# Patient Record
Sex: MALE | Race: WHITE
[De-identification: names, ages, dates, MRNs, and addresses within clinical notes are randomized per-mention and may not be internally consistent; named-entity substitution may affect disease eponyms.]

---

## 2017-06-27 ENCOUNTER — HOSPITAL ENCOUNTER (EMERGENCY)
Dept: HOSPITAL 47 - EC | Age: 72
Discharge: HOME | End: 2017-06-27
Payer: MEDICARE

## 2017-06-27 VITALS — HEART RATE: 63 BPM | SYSTOLIC BLOOD PRESSURE: 191 MMHG | DIASTOLIC BLOOD PRESSURE: 83 MMHG | RESPIRATION RATE: 13 BRPM

## 2017-06-27 VITALS — TEMPERATURE: 98.2 F

## 2017-06-27 DIAGNOSIS — Z79.899: ICD-10-CM

## 2017-06-27 DIAGNOSIS — Z79.4: ICD-10-CM

## 2017-06-27 DIAGNOSIS — E11.9: ICD-10-CM

## 2017-06-27 DIAGNOSIS — R60.0: Primary | ICD-10-CM

## 2017-06-27 DIAGNOSIS — E07.9: ICD-10-CM

## 2017-06-27 DIAGNOSIS — I10: ICD-10-CM

## 2017-06-27 LAB
ALP SERPL-CCNC: 94 U/L (ref 38–126)
ALT SERPL-CCNC: 28 U/L (ref 21–72)
ANION GAP SERPL CALC-SCNC: 9 MMOL/L
APTT BLD: 22.9 SEC (ref 22–30)
AST SERPL-CCNC: 46 U/L (ref 17–59)
BASOPHILS # BLD AUTO: 0 K/UL (ref 0–0.2)
BASOPHILS NFR BLD AUTO: 0 %
BUN SERPL-SCNC: 11 MG/DL (ref 9–20)
CALCIUM SPEC-MCNC: 9.8 MG/DL (ref 8.4–10.2)
CH: 31
CHCM: 34.7
CHLORIDE SERPL-SCNC: 103 MMOL/L (ref 98–107)
CK SERPL-CCNC: 91 U/L (ref 55–170)
CO2 SERPL-SCNC: 25 MMOL/L (ref 22–30)
EOSINOPHIL # BLD AUTO: 0.1 K/UL (ref 0–0.7)
EOSINOPHIL NFR BLD AUTO: 1 %
ERYTHROCYTE [DISTWIDTH] IN BLOOD BY AUTOMATED COUNT: 4.58 M/UL (ref 4.3–5.9)
ERYTHROCYTE [DISTWIDTH] IN BLOOD: 12.9 % (ref 11.5–15.5)
GLUCOSE SERPL-MCNC: 175 MG/DL (ref 74–99)
HCT VFR BLD AUTO: 41.2 % (ref 39–53)
HDW: 2.65
HGB BLD-MCNC: 14.9 GM/DL (ref 13–17.5)
INR PPP: 1 (ref ?–1.1)
LUC NFR BLD AUTO: 2 %
LYMPHOCYTES # SPEC AUTO: 2.4 K/UL (ref 1–4.8)
LYMPHOCYTES NFR SPEC AUTO: 28 %
MAGNESIUM SPEC-SCNC: 2.3 MG/DL (ref 1.6–2.3)
MCH RBC QN AUTO: 32.5 PG (ref 25–35)
MCHC RBC AUTO-ENTMCNC: 36.1 G/DL (ref 31–37)
MCV RBC AUTO: 89.9 FL (ref 80–100)
MONOCYTES # BLD AUTO: 0.5 K/UL (ref 0–1)
MONOCYTES NFR BLD AUTO: 6 %
NEUTROPHILS # BLD AUTO: 5.6 K/UL (ref 1.3–7.7)
NEUTROPHILS NFR BLD AUTO: 64 %
NON-AFRICAN AMERICAN GFR(MDRD): >60
PH UR: 7.5 [PH] (ref 5–8)
PHOSPHATE SERPL-MCNC: 3.3 MG/DL (ref 2.5–4.5)
POTASSIUM SERPL-SCNC: 5.2 MMOL/L (ref 3.5–5.1)
POTASSIUM UR-SCNC: 6.3 MMOL/L
PROT SERPL-MCNC: 7.7 G/DL (ref 6.3–8.2)
PT BLD: 10.2 SEC (ref 9–12)
SODIUM SERPL-SCNC: 137 MMOL/L (ref 137–145)
SODIUM UR-SCNC: 81 MMOL/L (ref 30–90)
SP GR UR: 1 (ref 1–1.03)
TROPONIN I SERPL-MCNC: <0.012 NG/ML (ref 0–0.03)
UA BILLING (MACRO VS. MICRO): (no result)
UROBILINOGEN UR QL STRIP: <2 MG/DL (ref ?–2)
WBC # BLD AUTO: 0.15 10*3/UL
WBC # BLD AUTO: 8.7 K/UL (ref 3.8–10.6)
WBC (PEROX): 8.34

## 2017-06-27 PROCEDURE — 84133 ASSAY OF URINE POTASSIUM: CPT

## 2017-06-27 PROCEDURE — 85610 PROTHROMBIN TIME: CPT

## 2017-06-27 PROCEDURE — 99284 EMERGENCY DEPT VISIT MOD MDM: CPT

## 2017-06-27 PROCEDURE — 93971 EXTREMITY STUDY: CPT

## 2017-06-27 PROCEDURE — 82570 ASSAY OF URINE CREATININE: CPT

## 2017-06-27 PROCEDURE — 87086 URINE CULTURE/COLONY COUNT: CPT

## 2017-06-27 PROCEDURE — 84300 ASSAY OF URINE SODIUM: CPT

## 2017-06-27 PROCEDURE — 85730 THROMBOPLASTIN TIME PARTIAL: CPT

## 2017-06-27 PROCEDURE — 84484 ASSAY OF TROPONIN QUANT: CPT

## 2017-06-27 PROCEDURE — 96360 HYDRATION IV INFUSION INIT: CPT

## 2017-06-27 PROCEDURE — 93005 ELECTROCARDIOGRAM TRACING: CPT

## 2017-06-27 PROCEDURE — 80053 COMPREHEN METABOLIC PANEL: CPT

## 2017-06-27 PROCEDURE — 82310 ASSAY OF CALCIUM: CPT

## 2017-06-27 PROCEDURE — 84156 ASSAY OF PROTEIN URINE: CPT

## 2017-06-27 PROCEDURE — 87186 SC STD MICRODIL/AGAR DIL: CPT

## 2017-06-27 PROCEDURE — 83935 ASSAY OF URINE OSMOLALITY: CPT

## 2017-06-27 PROCEDURE — 87077 CULTURE AEROBIC IDENTIFY: CPT

## 2017-06-27 PROCEDURE — 81003 URINALYSIS AUTO W/O SCOPE: CPT

## 2017-06-27 PROCEDURE — 84100 ASSAY OF PHOSPHORUS: CPT

## 2017-06-27 PROCEDURE — 75635 CT ANGIO ABDOMINAL ARTERIES: CPT

## 2017-06-27 PROCEDURE — 83735 ASSAY OF MAGNESIUM: CPT

## 2017-06-27 PROCEDURE — 85025 COMPLETE CBC W/AUTO DIFF WBC: CPT

## 2017-06-27 PROCEDURE — 82553 CREATINE MB FRACTION: CPT

## 2017-06-27 PROCEDURE — 85379 FIBRIN DEGRADATION QUANT: CPT

## 2017-06-27 PROCEDURE — 36415 COLL VENOUS BLD VENIPUNCTURE: CPT

## 2017-06-27 PROCEDURE — 83880 ASSAY OF NATRIURETIC PEPTIDE: CPT

## 2017-06-27 PROCEDURE — 82550 ASSAY OF CK (CPK): CPT

## 2017-06-27 PROCEDURE — 96361 HYDRATE IV INFUSION ADD-ON: CPT

## 2017-06-27 NOTE — US
EXAMINATION TYPE: US venous doppler duplex LE LT

 

DATE OF EXAM: 6/27/2017 12:00 PM

 

COMPARISON: NONE

 

CLINICAL HISTORY: Pain. edema left leg

 

SIDE PERFORMED: left  

 

TECHNIQUE:  The lower extremity deep venous system is examined utilizing real time linear array sonog
navya with graded compression, doppler sonography and color-flow sonography.

 

VESSELS IMAGED:

External Iliac Vein (EIV)

Common Femoral Vein

Deep Femoral Vein

Greater Saphenous Vein *

Femoral Vein

Popliteal Vein

Small Saphenous Vein *

Proximal Calf Veins

(* superficial vessels)

 

 

 

Left Leg:  No evidence of DVT

 

 

 

IMPRESSION:  Grayscale, color doppler, spectral doppler imaging performed of the deep veins of the lo
wer extremities.  There is normal flow, compressibility, vascular waveforms bilaterally.  No evident 
deep venous thrombosis at or above the left knee.

## 2017-06-27 NOTE — ED
General Adult HPI





- General


Chief complaint: Extremity Problem,Nontraumatic


Stated complaint: left leg swelling


Time Seen by Provider: 06/27/17 11:16


Source: patient, RN notes reviewed, old records reviewed


Mode of arrival: wheelchair


Limitations: no limitations





- History of Present Illness


Initial comments: 





This is a 72-year-old male to the ER for evaluation.Today this patient presents 

for evaluation of left lower extremity edema, further exploratory pain.  

Patient denies any other complaints.  He does have history of diabetes.  He 

denies any erythema or drainage from his left leg.  Patient's pain and swelling 

in his left leg is been for 2 weeks, only in his left legpain or swelling in 

his right leg.  No trauma or travel history no history of clots.  Patient is 

not on blood thinners.  Again patient has no chest pain or shortness of breath.





- Related Data


 Home Medications











 Medication  Instructions  Recorded  Confirmed


 


A&D Capsule 1 cap PO QAM 06/27/17 06/27/17


 


Diltiazem HCl [Cartia Xt] 120 mg PO DAILY 06/27/17 06/27/17


 


Dim Enhanced 1 tab PO QAM 06/27/17 06/27/17


 


Antoinette  1 cap PO QAM 06/27/17 06/27/17


 


Glucobrium 1 tab PO DAILY 06/27/17 06/27/17


 


Homocystrol Tmg 1 tab PO QAM 06/27/17 06/27/17


 


Insulin NPH/Reg Insulin 70/30 15 mg PO HS 06/27/17 06/27/17





[humuLIN 70/30 VIAL]   


 


Peenuts 1 tab PO QAM 06/27/17 06/27/17


 


Pro Omega 1 tab PO QAM 06/27/17 06/27/17


 


Thyroid,Pork [Dix Thyroid] 15 mg PO DAILY 06/27/17 06/27/17


 


Thyroid,Pork [Dix Thyroid] 90 mg PO DAILY 06/27/17 06/27/17











 Allergies











Allergy/AdvReac Type Severity Reaction Status Date / Time


 


No Known Allergies Allergy   Verified 06/27/17 11:41














Review of Systems


ROS Statement: 


Those systems with pertinent positive or pertinent negative responses have been 

documented in the HPI.





ROS Other: All systems not noted in ROS Statement are negative.





Past Medical History


Past Medical History: Diabetes Mellitus, GERD/Reflux, Hyperlipidemia, 

Hypertension, Thyroid Disorder


History of Any Multi-Drug Resistant Organisms: None Reported


Past Psychological History: No Psychological Hx Reported


Smoking Status: Never smoker


Past Alcohol Use History: None Reported


Past Drug Use History: None Reported





General Exam


Limitations: no limitations


General appearance: alert, in no apparent distress


Head exam: Present: atraumatic, normocephalic, normal inspection


Eye exam: Present: normal appearance, PERRL, EOMI.  Absent: scleral icterus, 

conjunctival injection, periorbital swelling


ENT exam: Present: normal exam, mucous membranes moist


Neck exam: Present: normal inspection.  Absent: tenderness, meningismus, 

lymphadenopathy


Respiratory exam: Present: normal lung sounds bilaterally.  Absent: respiratory 

distress, wheezes, rales, rhonchi, stridor


Cardiovascular Exam: Present: regular rate, normal rhythm, normal heart sounds.

  Absent: systolic murmur, diastolic murmur, rubs, gallop, clicks


GI/Abdominal exam: Present: soft, normal bowel sounds.  Absent: distended, 

tenderness, guarding, rebound, rigid


Extremities exam: Present: normal inspection, full ROM, normal capillary refill

, other (Left lower extremity edema 2+).  Absent: tenderness, pedal edema, 

joint swelling, calf tenderness


Back exam: Present: normal inspection


Neurological exam: Present: alert, oriented X3, CN II-XII intact


Psychiatric exam: Present: normal affect, normal mood


Skin exam: Present: warm, dry, intact, normal color.  Absent: rash





Course


 Vital Signs











  06/27/17 06/27/17 06/27/17





  11:05 13:35 13:42


 


Temperature 98.4 F 98.2 F 


 


Pulse Rate 77 64 53 L


 


Respiratory 20 18 20





Rate   


 


Blood Pressure 190/81 185/82 207/83


 


O2 Sat by Pulse 99 97 99





Oximetry   














  06/27/17 06/27/17





  14:45 15:07


 


Temperature  


 


Pulse Rate 60 65


 


Respiratory 20 20





Rate  


 


Blood Pressure 183/77 181/79


 


O2 Sat by Pulse 96 96





Oximetry  














- Reevaluation(s)


Reevaluation #1: 





06/27/17 12:10


Patient is with no specific pain at this time.


Reevaluation #2: 





06/27/17 13:03


Patient is with no active vomiting at this time





EKG Findings





- EKG Comments:


EKG Findings:: EKG shows sinus pericardiophrenic 58, , QRS 80, 





Medical Decision Making





- Medical Decision Making





72 male the ER for evaluation.  Patient presented today for evaluation 

regarding left leg edema.  No specific injury found for edema.  No causes edema 

found.  Patient will follow up with primary care and further evaluation and 

treatment, management.  Patient's labwork is normal CT of left lower extremity 

as well as THE LEFT LOWER EXTREMITY ARE NEGATIVE





- Lab Data


Result diagrams: 


 06/27/17 13:10





 06/27/17 13:10


 Lab Results











  06/27/17 06/27/17 06/27/17 Range/Units





  13:10 13:10 13:10 


 


WBC   8.7   (3.8-10.6)  k/uL


 


RBC   4.58   (4.30-5.90)  m/uL


 


Hgb   14.9   (13.0-17.5)  gm/dL


 


Hct   41.2   (39.0-53.0)  %


 


MCV   89.9   (80.0-100.0)  fL


 


MCH   32.5   (25.0-35.0)  pg


 


MCHC   36.1   (31.0-37.0)  g/dL


 


RDW   12.9   (11.5-15.5)  %


 


Plt Count   247   (150-450)  k/uL


 


Neutrophils %   64   %


 


Lymphocytes %   28   %


 


Monocytes %   6   %


 


Eosinophils %   1   %


 


Basophils %   0   %


 


Neutrophils #   5.6   (1.3-7.7)  k/uL


 


Lymphocytes #   2.4   (1.0-4.8)  k/uL


 


Monocytes #   0.5   (0-1.0)  k/uL


 


Eosinophils #   0.1   (0-0.7)  k/uL


 


Basophils #   0.0   (0-0.2)  k/uL


 


PT     (9.0-12.0)  sec


 


INR     (<1.1)  


 


APTT     (22.0-30.0)  sec


 


D-Dimer     (<0.60)  mg/L FEU


 


Sodium    137  (137-145)  mmol/L


 


Potassium    5.2 H  (3.5-5.1)  mmol/L


 


Chloride    103  ()  mmol/L


 


Carbon Dioxide    25  (22-30)  mmol/L


 


Anion Gap    9  mmol/L


 


BUN    11  (9-20)  mg/dL


 


Creatinine    0.67  (0.66-1.25)  mg/dL


 


Est GFR (MDRD) Af Amer    >60  (>60 ml/min/1.73 sqM)  


 


Est GFR (MDRD) Non-Af    >60  (>60 ml/min/1.73 sqM)  


 


Glucose    175 H  (74-99)  mg/dL


 


Calcium    9.8  (8.4-10.2)  mg/dL


 


Phosphorus    3.3  (2.5-4.5)  mg/dL


 


Magnesium    2.3  (1.6-2.3)  mg/dL


 


Total Bilirubin    1.2  (0.2-1.3)  mg/dL


 


AST    46  (17-59)  U/L


 


ALT    28  (21-72)  U/L


 


Alkaline Phosphatase    94  ()  U/L


 


Total Creatine Kinase  91    ()  U/L


 


CK-MB (CK-2)  1.3    (0.0-2.4)  ng/mL


 


CK-MB (CK-2) Rel Index  1.4    


 


Troponin I  <0.012    (0.000-0.034)  ng/mL


 


Total Protein    7.7  (6.3-8.2)  g/dL


 


Albumin    4.4  (3.5-5.0)  g/dL


 


Urine Color     


 


Urine Appearance     (Clear)  


 


Urine pH     (5.0-8.0)  


 


Ur Specific Gravity     (1.001-1.035)  


 


Urine Protein     (Negative)  


 


Urine Glucose (UA)     (Negative)  


 


Urine Ketones     (Negative)  


 


Urine Blood     (Negative)  


 


Urine Nitrite     (Negative)  


 


Urine Bilirubin     (Negative)  


 


Urine Urobilinogen     (<2.0)  mg/dL


 


Ur Leukocyte Esterase     (Negative)  


 


Urine Osmolality     ()  mosm/kg


 


U Random Total Protein     (<12)  mg/dL


 


Ur Random Sodium     (30-90)  mmol/L


 


Ur Random Potassium     mmol/L


 


Ur Random Calcium     mg/dL














  06/27/17 06/27/17 06/27/17 Range/Units





  13:10 14:55 14:55 


 


WBC     (3.8-10.6)  k/uL


 


RBC     (4.30-5.90)  m/uL


 


Hgb     (13.0-17.5)  gm/dL


 


Hct     (39.0-53.0)  %


 


MCV     (80.0-100.0)  fL


 


MCH     (25.0-35.0)  pg


 


MCHC     (31.0-37.0)  g/dL


 


RDW     (11.5-15.5)  %


 


Plt Count     (150-450)  k/uL


 


Neutrophils %     %


 


Lymphocytes %     %


 


Monocytes %     %


 


Eosinophils %     %


 


Basophils %     %


 


Neutrophils #     (1.3-7.7)  k/uL


 


Lymphocytes #     (1.0-4.8)  k/uL


 


Monocytes #     (0-1.0)  k/uL


 


Eosinophils #     (0-0.7)  k/uL


 


Basophils #     (0-0.2)  k/uL


 


PT  10.2    (9.0-12.0)  sec


 


INR  1.0    (<1.1)  


 


APTT  22.9    (22.0-30.0)  sec


 


D-Dimer  0.59    (<0.60)  mg/L FEU


 


Sodium     (137-145)  mmol/L


 


Potassium     (3.5-5.1)  mmol/L


 


Chloride     ()  mmol/L


 


Carbon Dioxide     (22-30)  mmol/L


 


Anion Gap     mmol/L


 


BUN     (9-20)  mg/dL


 


Creatinine     (0.66-1.25)  mg/dL


 


Est GFR (MDRD) Af Amer     (>60 ml/min/1.73 sqM)  


 


Est GFR (MDRD) Non-Af     (>60 ml/min/1.73 sqM)  


 


Glucose     (74-99)  mg/dL


 


Calcium     (8.4-10.2)  mg/dL


 


Phosphorus     (2.5-4.5)  mg/dL


 


Magnesium     (1.6-2.3)  mg/dL


 


Total Bilirubin     (0.2-1.3)  mg/dL


 


AST     (17-59)  U/L


 


ALT     (21-72)  U/L


 


Alkaline Phosphatase     ()  U/L


 


Total Creatine Kinase     ()  U/L


 


CK-MB (CK-2)     (0.0-2.4)  ng/mL


 


CK-MB (CK-2) Rel Index     


 


Troponin I     (0.000-0.034)  ng/mL


 


Total Protein     (6.3-8.2)  g/dL


 


Albumin     (3.5-5.0)  g/dL


 


Urine Color     


 


Urine Appearance     (Clear)  


 


Urine pH     (5.0-8.0)  


 


Ur Specific Gravity     (1.001-1.035)  


 


Urine Protein     (Negative)  


 


Urine Glucose (UA)     (Negative)  


 


Urine Ketones     (Negative)  


 


Urine Blood     (Negative)  


 


Urine Nitrite     (Negative)  


 


Urine Bilirubin     (Negative)  


 


Urine Urobilinogen     (<2.0)  mg/dL


 


Ur Leukocyte Esterase     (Negative)  


 


Urine Osmolality    183  ()  mosm/kg


 


U Random Total Protein     (<12)  mg/dL


 


Ur Random Sodium     (30-90)  mmol/L


 


Ur Random Potassium     mmol/L


 


Ur Random Calcium   4.2   mg/dL














  06/27/17 06/27/17 Range/Units





  14:55 14:55 


 


WBC    (3.8-10.6)  k/uL


 


RBC    (4.30-5.90)  m/uL


 


Hgb    (13.0-17.5)  gm/dL


 


Hct    (39.0-53.0)  %


 


MCV    (80.0-100.0)  fL


 


MCH    (25.0-35.0)  pg


 


MCHC    (31.0-37.0)  g/dL


 


RDW    (11.5-15.5)  %


 


Plt Count    (150-450)  k/uL


 


Neutrophils %    %


 


Lymphocytes %    %


 


Monocytes %    %


 


Eosinophils %    %


 


Basophils %    %


 


Neutrophils #    (1.3-7.7)  k/uL


 


Lymphocytes #    (1.0-4.8)  k/uL


 


Monocytes #    (0-1.0)  k/uL


 


Eosinophils #    (0-0.7)  k/uL


 


Basophils #    (0-0.2)  k/uL


 


PT    (9.0-12.0)  sec


 


INR    (<1.1)  


 


APTT    (22.0-30.0)  sec


 


D-Dimer    (<0.60)  mg/L FEU


 


Sodium    (137-145)  mmol/L


 


Potassium    (3.5-5.1)  mmol/L


 


Chloride    ()  mmol/L


 


Carbon Dioxide    (22-30)  mmol/L


 


Anion Gap    mmol/L


 


BUN    (9-20)  mg/dL


 


Creatinine    (0.66-1.25)  mg/dL


 


Est GFR (MDRD) Af Amer    (>60 ml/min/1.73 sqM)  


 


Est GFR (MDRD) Non-Af    (>60 ml/min/1.73 sqM)  


 


Glucose    (74-99)  mg/dL


 


Calcium    (8.4-10.2)  mg/dL


 


Phosphorus    (2.5-4.5)  mg/dL


 


Magnesium    (1.6-2.3)  mg/dL


 


Total Bilirubin    (0.2-1.3)  mg/dL


 


AST    (17-59)  U/L


 


ALT    (21-72)  U/L


 


Alkaline Phosphatase    ()  U/L


 


Total Creatine Kinase    ()  U/L


 


CK-MB (CK-2)    (0.0-2.4)  ng/mL


 


CK-MB (CK-2) Rel Index    


 


Troponin I    (0.000-0.034)  ng/mL


 


Total Protein    (6.3-8.2)  g/dL


 


Albumin    (3.5-5.0)  g/dL


 


Urine Color   Colorless  


 


Urine Appearance   Clear  (Clear)  


 


Urine pH   7.5  (5.0-8.0)  


 


Ur Specific Gravity   1.001  (1.001-1.035)  


 


Urine Protein   Negative  (Negative)  


 


Urine Glucose (UA)   Negative  (Negative)  


 


Urine Ketones   Negative  (Negative)  


 


Urine Blood   Negative  (Negative)  


 


Urine Nitrite   Negative  (Negative)  


 


Urine Bilirubin   Negative  (Negative)  


 


Urine Urobilinogen   <2.0  (<2.0)  mg/dL


 


Ur Leukocyte Esterase   Negative  (Negative)  


 


Urine Osmolality    ()  mosm/kg


 


U Random Total Protein  13 H   (<12)  mg/dL


 


Ur Random Sodium  81   (30-90)  mmol/L


 


Ur Random Potassium  6.3   mmol/L


 


Ur Random Calcium    mg/dL














- Radiology Data


Radiology results: report reviewed (Ultrasound left lower extremity negative 

for DVT, patient does have CT of the abdominal aorta with left lower extremity 

runoff, patient does have patent arteries from abdominal aortic artery, iliac 

arteries down to foot), image reviewed





Disposition


Clinical Impression: 


 Leg edema, left





Disposition: HOME SELF-CARE


Condition: Fair


Instructions:  Leg Edema (ED)


Referrals: 


Jan Bay MD [Primary Care Provider] - 1-2 days

## 2017-06-27 NOTE — CT
EXAMINATION TYPE: CT angio abd aorta wo/w con

 

DATE OF EXAM: 6/27/2017

 

COMPARISON: NONE

 

HISTORY: Left leg swelling from hip to ankle x 3 months.

 

CT DLP: 844.80 mGycm, Automated Exposure Control for Dose Reduction was Utilized.

 

CONTRAST: 

CT scan of the abdomen and pelvis is performed with oral and without and with IV Contrast, patient in
jected with 125 mL of Omnipaque 350.

 

FINDINGS: Visualized portions of the lungs are clear. There is no pleural or pericardial fluid. The h
eart is not enlarged.

 

There is a small hiatal hernia.

 

Within the abdomen, the liver spleen and gallbladder appear normal.

 

Both adrenal glands appear normal.

 

Both kidneys demonstrate function and appear morphologically normal.

 

The pancreas is unremarkable.

 

The bladder is unremarkable.

 

The sigmoid colon is collapsed. This makes assessment wall thickness difficult. The descending colon 
is also collapsed. The appendix is not visualized.

 

Small bowel loops are normal.

 

There is no free fluid and no free air identified.

 

There is facet arthropathy and hypertrophic spondylosis within the spine. There is a degenerative gra
de 1 spondylolisthesis of L4 and L5.

 

The aorta is normal in caliber. The celiac, SMA and STARR vessels are patent. Both renal arteries are p
atent. There is moderate atheromatous calcification of the visualized arterial tree. Both the common 
iliacs are unremarkable. The internal/external iliac are patent. The superficial femoral and profunda
 femoral arteries are patent. Both popliteal arteries are patent. Initially there is two-vessel runof
f on the right but all the vessels attenuated distally. There is two-vessel runoff to the ankle on th
e left and one-vessel runoff on the right.

 

IMPRESSION:

1. CANNULATION OF THE LOWER LOBE VESSELS WITH TWO-VESSEL RUNOFF TO THE ANKLE ON THE LEFT AND ONE-VESS
EL RUNOFF ON THE RIGHT.

2. SMALL HIATAL HERNIA.

3. COLLAPSE OF THE LEFT SIDE OF THE COLON MAKES IT DIFFICULT TO ASSESS COLONIC THICKNESS. PLEASE FRANCIS
ELATE TO EXCLUDE COLITIS.

4. 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

## 2019-01-07 ENCOUNTER — HOSPITAL ENCOUNTER (OUTPATIENT)
Dept: HOSPITAL 47 - RADFLMAIN | Age: 74
Discharge: HOME | End: 2019-01-07
Attending: FAMILY MEDICINE
Payer: MEDICARE

## 2019-01-07 DIAGNOSIS — I69.991: ICD-10-CM

## 2019-01-07 DIAGNOSIS — R01.1: Primary | ICD-10-CM

## 2019-01-07 PROCEDURE — 74230 X-RAY XM SWLNG FUNCJ C+: CPT

## 2019-01-07 PROCEDURE — 93306 TTE W/DOPPLER COMPLETE: CPT

## 2019-01-07 NOTE — FL
EXAMINATION TYPE: FL barium swallow w video

 

DATE OF EXAM: 1/7/2019

 

MODIFIED SWALLOW / DEGLUTITION STUDY

 

CLINICAL HISTORY: Dysphagia. History of CVA.

 

TECHNIQUE:  Deglutition study is performed utilizing thin liquid barium, honey and nectar thick liqui
d barium, barium thick applesauce, and barium coated cracker. 0 images were saved as exam was video r
ecorded. 1 minute and 13 seconds of fluoroscopy time was utilized.

 

COMPARISON: None.

 

FINDINGS: The oral and pharyngeal phases show satisfactory initiation and propagation with all thicke
sy modalities tested. With the thin consistency there is premature spill to the level of the vallecu
la. Normal mastication is seen with solid modalities tested. Deep penetration was seen with the thin 
consistencies reduced occasional trace penetration with the chin tuck maneuver. There is no evidence 
of laryngeal aspiration with any modality tested.  No significant pharyngeal residue was appreciated.


 

IMPRESSION: Deep penetration when utilizing thin consistencies reduced to occasional trace penetratio
n with the utilization of the chin tuck maneuver. No laryngeal aspiration.  Please refer to speech th
erapist notes for further details if necessary.

## 2019-01-08 NOTE — ECHOF
Referral Reason:R01.1 Murmur, [id number] Dysphagia following



MEASUREMENTS

--------

HEIGHT: 177.8 cm

WEIGHT: 81.6 kg

BP: 176/76

RVIDd:   3.1 cm     (< 3.3)

IVSd:   1.2 cm     (0.6 - 1.1)

LVIDd:   4.3 cm     (3.9 - 5.3)

LVPWd:   1.1 cm     (0.6 - 1.1)

IVSs:   1.8 cm

LVIDs:   2.9 cm

LVPWs:   1.7 cm

LA Diam:   3.5 cm     (2.7 - 3.8)

LAESV Index (A-L):   22.48 ml/m

Ao Diam:   3.5 cm     (2.0 - 3.7)

AV Cusp:   2.3 cm     (1.5 - 2.6)

MV EXCURSION:   13.536 mm     (> 18.000)

MV EF SLOPE:   52 mm/s     (70 - 150)

EPSS:   0.7 cm

MV E Harley:   0.78 m/s

MV DecT:   307 ms

MV A Harley:   1.14 m/s

MV E/A Ratio:   0.68 

AR PHT:   664 ms







FINDINGS

--------

Sinus rhythm.

This was a technically adequate study.

The left ventricular size is normal.   There is borderline concentric left ventricular hypertrophy.  
 Overall left ventricular systolic function is low-normal with, an EF between 50 - 55 %.   Apical inf
erior LV wall motion is hypokinetic.    Apical septum LV wall motion is hypokinetic.

The right ventricle is normal in size.

Normal LA  size by volume 22+/-6 ml/m2.

The right atrium is normal in size.

There is mild aortic valve sclerosis.   There is mild aortic regurgitation.

Mild mitral annular calcification present.

The tricuspid valve appears structurally normal.

Trace/mild (physiologic)  pulmonic regurgitation.

The aortic root size is normal.

Normal inferior vena cava with normal inspiratory collapse consistent with estimated right atrial pre
ssure of  5 mmHg.

There is no pericardial effusion.



CONCLUSIONS

--------

1. Sinus rhythm.

2. This was a technically adequate study.

3. The left ventricular size is normal.

4. There is borderline concentric left ventricular hypertrophy.

5. Overall left ventricular systolic function is low-normal with, an EF between 50 - 55 %.

6. Apical inferior LV wall motion is hypokinetic.

7. Apical septum LV wall motion is hypokinetic.

8. The right ventricle is normal in size.

9. Normal LA size by volume 22+/-6 ml/m2.

10. The right atrium is normal in size.

11. There is mild aortic valve sclerosis.

12. There is mild aortic regurgitation.

13. Mild mitral annular calcification present.

14. The tricuspid valve appears structurally normal.

15. Trace/mild (physiologic)  pulmonic regurgitation.

16. The aortic root size is normal.

17. Normal inferior vena cava with normal inspiratory collapse consistent with estimated right atrial
 pressure of  5 mmHg.

18. There is no pericardial effusion.





SONOGRAPHER: Iva Rivero RDCS

## 2019-10-24 ENCOUNTER — HOSPITAL ENCOUNTER (OUTPATIENT)
Dept: HOSPITAL 47 - RADXRYALE | Age: 74
Discharge: HOME | End: 2019-10-24
Attending: PHYSICIAN ASSISTANT
Payer: MEDICARE

## 2019-10-24 DIAGNOSIS — M25.512: Primary | ICD-10-CM

## 2019-10-24 DIAGNOSIS — M25.522: ICD-10-CM

## 2019-10-24 NOTE — XR
Left elbow

 

HISTORY: Left elbow pain, trauma one week prior

 

3 views of the left elbow

 

There is no evident joint effusion. Marginal spurring is present at the radial head consistent with o
steoarthritic change. Alignment and bone mineralization are maintained.

 

IMPRESSION: No fracture or dislocation.

## 2019-10-24 NOTE — XR
Left shoulder

 

HISTORY: Pain, trauma one week prior

 

3 views of left shoulder

 

Bone mineralization is reduced. Alignment, joint spaces are maintained. Left lung apex as visualized 
is normal. Atheromatous changes present within the aorta.

 

IMPRESSION: No fracture or dislocation.

## 2020-08-31 ENCOUNTER — HOSPITAL ENCOUNTER (OUTPATIENT)
Dept: HOSPITAL 47 - RADXRYALE | Age: 75
Discharge: HOME | End: 2020-08-31
Attending: FAMILY MEDICINE
Payer: MEDICARE

## 2020-08-31 DIAGNOSIS — M19.032: Primary | ICD-10-CM

## 2020-08-31 NOTE — XR
Left wrist

 

HISTORY: Left wrist pain

 

3 views of the left wrist

 

Bone mineralization, joint spaces and alignment are maintained with exception of osteoarthritic pickard
e at the first metacarpal phalangeal joint. No fracture or dislocation. 

 

IMPRESSION: Osteoarthritis. Consider wrist MRI

## 2021-01-14 ENCOUNTER — HOSPITAL ENCOUNTER (OUTPATIENT)
Dept: HOSPITAL 47 - RADXRYALE | Age: 76
Discharge: HOME | End: 2021-01-14
Attending: FAMILY MEDICINE
Payer: MEDICARE

## 2021-01-14 DIAGNOSIS — Z11.1: ICD-10-CM

## 2021-01-14 DIAGNOSIS — Z02.2: Primary | ICD-10-CM

## 2021-01-14 PROCEDURE — 71046 X-RAY EXAM CHEST 2 VIEWS: CPT

## 2021-01-14 NOTE — XR
EXAMINATION TYPE: XR chest 2V

 

DATE OF EXAM: 1/14/2021

 

COMPARISON: NONE

 

TECHNIQUE: PA and lateral views submitted.

 

HISTORY: Nursing home

 

FINDINGS:

The lungs are clear and  there is no pneumothorax, pleural effusion, or focal pneumonia.  Hypertrophi
c and degenerative change of the spine. Atherosclerotic change aorta. No overt failure. Biapical pleu
ral thickening.

 

IMPRESSION: 

1. No acute process.

## 2021-06-04 ENCOUNTER — HOSPITAL ENCOUNTER (INPATIENT)
Dept: HOSPITAL 47 - EC | Age: 76
LOS: 5 days | Discharge: HOME | DRG: 645 | End: 2021-06-09
Attending: HOSPITALIST | Admitting: HOSPITALIST
Payer: MEDICARE

## 2021-06-04 VITALS — BODY MASS INDEX: 30.1 KG/M2

## 2021-06-04 DIAGNOSIS — M19.91: ICD-10-CM

## 2021-06-04 DIAGNOSIS — N40.0: ICD-10-CM

## 2021-06-04 DIAGNOSIS — I10: ICD-10-CM

## 2021-06-04 DIAGNOSIS — G31.84: ICD-10-CM

## 2021-06-04 DIAGNOSIS — Z86.73: ICD-10-CM

## 2021-06-04 DIAGNOSIS — E03.9: ICD-10-CM

## 2021-06-04 DIAGNOSIS — Z20.822: ICD-10-CM

## 2021-06-04 DIAGNOSIS — R53.81: ICD-10-CM

## 2021-06-04 DIAGNOSIS — R26.9: ICD-10-CM

## 2021-06-04 DIAGNOSIS — E78.5: ICD-10-CM

## 2021-06-04 DIAGNOSIS — Z79.84: ICD-10-CM

## 2021-06-04 DIAGNOSIS — E86.1: ICD-10-CM

## 2021-06-04 DIAGNOSIS — E22.2: Primary | ICD-10-CM

## 2021-06-04 DIAGNOSIS — E11.9: ICD-10-CM

## 2021-06-04 DIAGNOSIS — Z79.899: ICD-10-CM

## 2021-06-04 DIAGNOSIS — K21.9: ICD-10-CM

## 2021-06-04 PROCEDURE — 83605 ASSAY OF LACTIC ACID: CPT

## 2021-06-04 PROCEDURE — 84484 ASSAY OF TROPONIN QUANT: CPT

## 2021-06-04 PROCEDURE — 85730 THROMBOPLASTIN TIME PARTIAL: CPT

## 2021-06-04 PROCEDURE — 93005 ELECTROCARDIOGRAM TRACING: CPT

## 2021-06-04 PROCEDURE — 87077 CULTURE AEROBIC IDENTIFY: CPT

## 2021-06-04 PROCEDURE — 99285 EMERGENCY DEPT VISIT HI MDM: CPT

## 2021-06-04 PROCEDURE — 82550 ASSAY OF CK (CPK): CPT

## 2021-06-04 PROCEDURE — 80048 BASIC METABOLIC PNL TOTAL CA: CPT

## 2021-06-04 PROCEDURE — 36415 COLL VENOUS BLD VENIPUNCTURE: CPT

## 2021-06-04 PROCEDURE — 85610 PROTHROMBIN TIME: CPT

## 2021-06-04 PROCEDURE — 72125 CT NECK SPINE W/O DYE: CPT

## 2021-06-04 PROCEDURE — 80053 COMPREHEN METABOLIC PANEL: CPT

## 2021-06-04 PROCEDURE — 83735 ASSAY OF MAGNESIUM: CPT

## 2021-06-04 PROCEDURE — 71046 X-RAY EXAM CHEST 2 VIEWS: CPT

## 2021-06-04 PROCEDURE — 70450 CT HEAD/BRAIN W/O DYE: CPT

## 2021-06-04 PROCEDURE — 87635 SARS-COV-2 COVID-19 AMP PRB: CPT

## 2021-06-04 PROCEDURE — 81001 URINALYSIS AUTO W/SCOPE: CPT

## 2021-06-04 PROCEDURE — 96360 HYDRATION IV INFUSION INIT: CPT

## 2021-06-04 PROCEDURE — 85025 COMPLETE CBC W/AUTO DIFF WBC: CPT

## 2021-06-04 PROCEDURE — 83880 ASSAY OF NATRIURETIC PEPTIDE: CPT

## 2021-06-04 PROCEDURE — 87086 URINE CULTURE/COLONY COUNT: CPT

## 2021-06-04 PROCEDURE — 87186 SC STD MICRODIL/AGAR DIL: CPT

## 2021-06-04 PROCEDURE — 83935 ASSAY OF URINE OSMOLALITY: CPT

## 2021-06-04 PROCEDURE — 83930 ASSAY OF BLOOD OSMOLALITY: CPT

## 2021-06-04 PROCEDURE — 84300 ASSAY OF URINE SODIUM: CPT

## 2021-06-04 PROCEDURE — 84443 ASSAY THYROID STIM HORMONE: CPT

## 2021-06-09 VITALS
DIASTOLIC BLOOD PRESSURE: 70 MMHG | HEART RATE: 68 BPM | RESPIRATION RATE: 19 BRPM | SYSTOLIC BLOOD PRESSURE: 159 MMHG | TEMPERATURE: 97.8 F

## 2022-01-18 ENCOUNTER — HOSPITAL ENCOUNTER (EMERGENCY)
Dept: HOSPITAL 47 - EC | Age: 77
Discharge: HOME | End: 2022-01-18
Payer: MEDICARE

## 2022-01-18 VITALS
TEMPERATURE: 97.9 F | DIASTOLIC BLOOD PRESSURE: 54 MMHG | RESPIRATION RATE: 16 BRPM | SYSTOLIC BLOOD PRESSURE: 142 MMHG | HEART RATE: 71 BPM

## 2022-01-18 DIAGNOSIS — E11.9: ICD-10-CM

## 2022-01-18 DIAGNOSIS — K59.00: Primary | ICD-10-CM

## 2022-01-18 DIAGNOSIS — K21.9: ICD-10-CM

## 2022-01-18 DIAGNOSIS — Z86.73: ICD-10-CM

## 2022-01-18 DIAGNOSIS — E78.5: ICD-10-CM

## 2022-01-18 DIAGNOSIS — E07.9: ICD-10-CM

## 2022-01-18 DIAGNOSIS — Z79.82: ICD-10-CM

## 2022-01-18 DIAGNOSIS — I10: ICD-10-CM

## 2022-01-18 DIAGNOSIS — Z79.84: ICD-10-CM

## 2022-01-18 PROCEDURE — 74018 RADEX ABDOMEN 1 VIEW: CPT

## 2022-01-18 PROCEDURE — 99283 EMERGENCY DEPT VISIT LOW MDM: CPT

## 2022-01-18 NOTE — ED
General Adult HPI





- General


Chief complaint: Abdominal Pain


Stated complaint: constipation


Time Seen by Provider: 01/18/22 10:19


Source: patient


Mode of arrival: ambulatory





- History of Present Illness


Initial comments: 


Dictation was produced using dragon dictation software. please excuse any 

grammatical, word or spelling errors. 











Chief Complaint: Patient is a 76-year-old male presents emergency department for

constipation





History of Present Illness: Patient is a 68-year-old male he lives at a adult 

assisted living facility.  Patient states she has not had a bowel movement 3 

days.  Try to disimpact himself at home but was unable to.  States to hard.  

Patient was given some milk of magnesia and prune juice with no relief.  Patient

has any fever.  No nausea vomiting.








The ROS documented in this emergency department record has been reviewed and 

confirmed by me.  Those systems with pertinent positive or negative responses 

have been documented in the HPI.  All other systems are other negative and/or 

noncontributory.








PHYSICAL EXAM:


General Impression: Alert and oriented x3, not in acute distress


HEENT: Normocephalic atraumatic, extra-ocular movements intact, pupils equal and

reactive to light bilaterally, mucous membranes moist.


Cardiovascular: Heart regular rate and rhythm


Chest: Able to complete full sentences, no retractions, no tachypnea


Abdomen: abdomen soft, non-tender, non-distended, no organomegaly


Musculoskeletal: Pulses present and equal in all extremities, no peripheral 

edema


Motor:  no focal deficits noted


Neurological: CN II-XII grossly intact, no focal motor or sensory deficits noted


Skin: Intact with no visualized rashes


Psych: Normal affect and mood


Rectal exam: Hard stools felt in the rectal vault to no bleeding, no fissures





ED course: 76-year-old male presents to the emergency department for 

constipation 3 days.  Vital signs upon arrival are within acceptable limits.  

Abdominal x-rays unremarkable.  Rectal disimpaction performed performed at 

bedside.  Hard stools were removed in the rectal vault.  Patient able to have 

bowel movements to get the rest of it out.  Patient was placed on bedside 

commode and had 3 large stool movements..  Reevaluated at the bedside found to 

be in stable medical condition.  He reports he feels better.  Patient be 

discharge back to assisted living facility.




















- Related Data


                                Home Medications











 Medication  Instructions  Recorded  Confirmed


 


Thyroid,Pork [[place] Thyroid] 90 mg PO DAILY@0800 06/27/17 01/18/22


 


Aspirin EC [Ecotrin Low Dose] 81 mg PO HS@2000 06/04/21 01/18/22


 


DULoxetine HCL [Cymbalta] 30 mg PO DAILY@0800 06/04/21 01/18/22


 


Finasteride [Proscar] 5 mg PO HS@2000 06/04/21 01/18/22


 


Nitroglycerin Sl Tabs [Nitrostat] 0.4 mg SL Q5M PRN 06/04/21 01/18/22


 


Phenol [Chloraseptic] 1 spr MM Q2H PRN 06/04/21 01/18/22


 


Sodium Chloride [Ocean] 2 spr EA NOSTRIL AS DIRECTED PRN 06/04/21 01/18/22


 


Tamsulosin HCl [Flomax] 0.4 mg PO DAILY@0800 06/04/21 01/18/22


 


glipiZIDE XL [Glucotrol Xl] 5 mg PO DAILY@0600 06/04/21 01/18/22


 


lisinopriL 40 mg PO BID@0800,2000 06/04/21 01/18/22


 


Atorvastatin [Lipitor] 40 mg PO HS@2000 01/18/22 01/18/22


 


Benzocaine/Menthol [Cepacol Sore 1 lozenge MM AS DIRECTED PRN 01/18/22 01/18/22





Throat Lozenge]   


 


Cholestyramine (with Sugar) 4 gm PO DAILY@0800 01/18/22 01/18/22





[Questran]   


 


Diltiazem Cd [Cardizem CD] 120 mg PO DAILY@0800 01/18/22 01/18/22


 


Furosemide [Lasix] 20 mg PO DAILY@0800 01/18/22 01/18/22


 


Ipratropium Bromide [Ipratropium 2 spr EA NOSTRIL TID@0800,1400,2000 01/18/22 01/18/22





Bromide 0.03%]   


 


Loratadine 10 mg PO HS@2000 01/18/22 01/18/22


 


Multivit-Min/FA/Lycopen/Lutein 1 tab PO DAILY@0800 01/18/22 01/18/22





[Centrum Silver Tablet]   


 


Sennosides/Docusate Sodium [Senna 1 tab PO HS@2000 01/18/22 01/18/22





Plus 8.6-50 mg Tablet]   


 


Simethicone [Mylanta Gas Minis] 125 mg PO QID PRN 01/18/22 01/18/22


 


Sodium Chloride Tab 1 gm PO DAILY@0800 01/18/22 01/18/22


 


metFORMIN HCL ER [Glucophage XR] 500 mg PO BID@0800,2000 01/18/22 01/18/22


 


sitaGLIPtin [Januvia] 100 mg PO DAILY@0800 01/18/22 01/18/22








                                  Previous Rx's











 Medication  Instructions  Recorded


 


Acetaminophen Tab [Tylenol] 325 mg PO Q6HR PRN  tab 06/09/21











                                    Allergies











Allergy/AdvReac Type Severity Reaction Status Date / Time


 


No Known Allergies Allergy   Verified 01/18/22 10:30














Review of Systems


ROS Statement: 


Those systems with pertinent positive or pertinent negative responses have been 

documented in the HPI.





ROS Other: All systems not noted in ROS Statement are negative.





Past Medical History


Past Medical History: CVA/TIA, Diabetes Mellitus, GERD/Reflux, Hyperlipidemia, 

Hypertension, Thyroid Disorder


Additional Past Medical History / Comment(s): States had a stroke left arm and 

leg numbness


History of Any Multi-Drug Resistant Organisms: None Reported


Additional Past Surgical History / Comment(s): cataract surgery


Past Anesthesia/Blood Transfusion Reactions: No Reported Reaction


Past Psychological History: No Psychological Hx Reported


Smoking Status: Never smoker


Past Alcohol Use History: None Reported


Past Drug Use History: None Reported





- Past Family History


  ** Mother


Family Medical History: Diabetes Mellitus





Course


                                   Vital Signs











  01/18/22





  10:01


 


Temperature 97.9 F


 


Pulse Rate 71


 


Respiratory 16





Rate 


 


Blood Pressure 142/54


 


O2 Sat by Pulse 96





Oximetry 














Disposition


Clinical Impression: 


 Constipation





Disposition: HOME SELF-CARE


Condition: Fair


Instructions (If sedation given, give patient instructions):  Constipation (ED)


Is patient prescribed a controlled substance at d/c from ED?: No


Referrals: 


Grover Champagne MD [Primary Care Provider] - 1-2 days

## 2022-01-18 NOTE — XR
EXAMINATION TYPE: XR abdomen 1V

 

DATE OF EXAM: 1/18/2022

 

COMPARISON: NONE

 

HISTORY: Pain

 

TECHNIQUE: One view abdominal series

 

FINDINGS:  

The osseous structures are intact.  The bowel gas pattern is nonspecific. Arthropathy of the hips and
 degenerative change of the spine. Calcifications in the pelvis likely vascular.

 

IMPRESSION:  

1.  Nonspecific abdomen.

2. 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

## 2022-03-16 ENCOUNTER — HOSPITAL ENCOUNTER (EMERGENCY)
Dept: HOSPITAL 47 - EC | Age: 77
LOS: 1 days | Discharge: HOME | End: 2022-03-17
Payer: MEDICARE

## 2022-03-16 DIAGNOSIS — Z79.82: ICD-10-CM

## 2022-03-16 DIAGNOSIS — Z79.84: ICD-10-CM

## 2022-03-16 DIAGNOSIS — M17.0: Primary | ICD-10-CM

## 2022-03-16 DIAGNOSIS — K21.9: ICD-10-CM

## 2022-03-16 DIAGNOSIS — Z79.890: ICD-10-CM

## 2022-03-16 DIAGNOSIS — I10: ICD-10-CM

## 2022-03-16 DIAGNOSIS — Z79.899: ICD-10-CM

## 2022-03-16 DIAGNOSIS — Z79.4: ICD-10-CM

## 2022-03-16 DIAGNOSIS — E78.5: ICD-10-CM

## 2022-03-16 DIAGNOSIS — E11.9: ICD-10-CM

## 2022-03-16 LAB — GLUCOSE BLD-MCNC: 259 MG/DL (ref 75–99)

## 2022-03-16 PROCEDURE — 73562 X-RAY EXAM OF KNEE 3: CPT

## 2022-03-16 PROCEDURE — 99284 EMERGENCY DEPT VISIT MOD MDM: CPT

## 2022-03-16 PROCEDURE — 96372 THER/PROPH/DIAG INJ SC/IM: CPT

## 2022-03-16 PROCEDURE — 36415 COLL VENOUS BLD VENIPUNCTURE: CPT

## 2022-03-16 NOTE — XR
EXAMINATION TYPE: XR knee complete bilateral

 

DATE OF EXAM: 3/16/2022

 

COMPARISON: NONE

 

HISTORY: Knee pain

 

TECHNIQUE: 3 views each knee

 

FINDINGS: There is bilateral narrowing of the medial joint spaces of the knees. There is vascular tony
cification. I see no fracture nor dislocation. There is no evidence of knee joint effusion.

 

IMPRESSION:

Osteoarthritis. No fracture seen.

## 2022-03-16 NOTE — ED
Extremity Problem HPI





- General


Chief complaint: Extremity Problem,Nontraumatic


Stated complaint: weakness, knee pain


Time Seen by Provider: 03/16/22 21:36


Source: EMS, RN notes reviewed


Mode of arrival: EMS





- History of Present Illness


Initial comments: 





This is a 77-year-old male who presents from the [facility] for bilateral knee

pain.  Patient states that his knees "went out.  "Although he denies falling.  

He states his knees are just sore.  He has no pain in the lower legs and thigh 

area.  No hip pain.  No other orthopedic complaints.  No fever or chills.  P

atient does have a history of joint aching.  No distal paresthesias.  No ankle 

or foot pain.


No headache, no fever or chills, no changes in vision or hearing, no sore throat

or difficulty with speech, no neck pain, no chest pain or shortness of breath, 

no abdominal pain, no nausea or vomiting, no changes in urination or bowel 

movements, no numbness or tingling, no extremity pain, no skin rashes or 

lesions.





Apparently the patient takes acetaminophen for pain control.  Patient does have 

a history of diabetes.





- Related Data


                                Home Medications











 Medication  Instructions  Recorded  Confirmed


 


Thyroid,Pork [[place] Thyroid] 90 mg PO DAILY@0800 06/27/17 03/16/22


 


Aspirin EC [Ecotrin Low Dose] 81 mg PO HS@2000 06/04/21 03/16/22


 


DULoxetine HCL [Cymbalta] 30 mg PO DAILY@0800 06/04/21 03/16/22


 


Finasteride [Proscar] 5 mg PO HS@2000 06/04/21 03/16/22


 


Nitroglycerin Sl Tabs [Nitrostat] 0.4 mg SL Q5M PRN 06/04/21 03/16/22


 


Sodium Chloride [Ocean] 2 spr EA NOSTRIL AS DIRECTED PRN 06/04/21 03/16/22


 


Tamsulosin HCl [Flomax] 0.8 mg PO DAILY@0800 06/04/21 03/16/22


 


glipiZIDE XL [Glucotrol Xl] 5 mg PO DAILY@0600 06/04/21 03/16/22


 


lisinopriL 40 mg PO BID@0800,2000 06/04/21 03/16/22


 


phenoL [Chloraseptic] 1 spray MUCOUS MEM AS DIRECTED PRN 06/04/21 03/16/22


 


Atorvastatin [Lipitor] 40 mg PO HS@2000 01/18/22 03/16/22


 


Benzocaine/Menthol [Cepacol Sore 1 lozenge MUCOUS MEM AS DIRECTED 01/18/22 03/16/22





Throat Lozenge] PRN  


 


Diltiazem Cd [Cardizem CD] 120 mg PO DAILY@0800 01/18/22 03/16/22


 


Furosemide [Lasix] 20 mg PO DAILY@0800 01/18/22 03/16/22


 


Ipratropium Bromide [Ipratropium 2 spr EA NOSTRIL TID@0800,1400,2000 01/18/22 03/16/22





Bromide 0.03%]   


 


Multivit-Min/FA/Lycopen/Lutein 1 tab PO DAILY@0800 01/18/22 03/16/22





[Centrum Silver Tablet]   


 


Sennosides/Docusate Sodium [Senna 2 tab PO HS@2000 01/18/22 03/16/22





Plus 8.6-50 mg Tablet]   


 


Simethicone [Mylanta Gas Minis] 125 mg PO QID PRN 01/18/22 03/16/22


 


Sodium Chloride Tab 1 gm PO DAILY@0800 01/18/22 03/16/22


 


metFORMIN HCL ER [Glucophage XR] 500 mg PO BID@0800,2000 01/18/22 03/16/22


 


sitaGLIPtin [Januvia] 100 mg PO DAILY@0800 01/18/22 03/16/22


 


Carboxymethylcellulose Sodium 1 - 2 drops BOTH EYES AS DIRECTED 03/16/22 03/16/22





[Refresh Tears] PRN  


 


Ciprofloxacin HCl [Cipro] 500 mg PO BID@0800,2000 03/16/22 03/16/22


 


Insulin Detemir (Levemir) [Levemir] 12 unit SQ HS@2000 03/16/22 03/16/22








                                  Previous Rx's











 Medication  Instructions  Recorded


 


Acetaminophen Tab [Tylenol] 325 mg PO Q6HR PRN  tab 06/09/21











                                    Allergies











Allergy/AdvReac Type Severity Reaction Status Date / Time


 


No Known Allergies Allergy   Verified 03/16/22 22:36














Review of Systems


ROS Statement: 


Those systems with pertinent positive or pertinent negative responses have been 

documented in the HPI.





ROS Other: All systems not noted in ROS Statement are negative.





Past Medical History


Past Medical History: CVA/TIA, Diabetes Mellitus, GERD/Reflux, Hyperlipidemia, 

Hypertension, Thyroid Disorder


Additional Past Medical History / Comment(s): States had a stroke left arm and 

leg numbness


History of Any Multi-Drug Resistant Organisms: None Reported


Additional Past Surgical History / Comment(s): cataract surgery


Past Anesthesia/Blood Transfusion Reactions: No Reported Reaction


Past Psychological History: No Psychological Hx Reported


Smoking Status: Never smoker


Past Alcohol Use History: None Reported


Past Drug Use History: None Reported





- Past Family History


  ** Mother


Family Medical History: Diabetes Mellitus





General Exam





- General Exam Comments


Initial Comments: 





Deconditioned appearing elderly male in no acute distress.  Patient does not 

appear to be ill or toxic.  Vital signs reviewed.


General appearance: alert, in no apparent distress


Head exam: Present: atraumatic, normocephalic, normal inspection


Eye exam: Present: normal appearance, PERRL, EOMI.  Absent: scleral icterus, 

conjunctival injection, periorbital swelling


ENT exam: Present: normal exam, mucous membranes moist


Neck exam: Present: normal inspection, full ROM.  Absent: tenderness, 

meningismus, lymphadenopathy


Respiratory exam: Present: normal lung sounds bilaterally.  Absent: respiratory 

distress, wheezes, rales, rhonchi, stridor, chest wall tenderness


Cardiovascular Exam: Present: regular rate, normal rhythm, normal heart sounds. 

 Absent: systolic murmur, diastolic murmur, rubs, gallop, clicks


GI/Abdominal exam: Present: soft, normal bowel sounds.  Absent: distended, 

tenderness, guarding, rebound, rigid


Extremities exam: Present: normal inspection, full ROM, tenderness, normal 

capillary refill, other (Minimal soft tissue tenderness to the anterior aspect 

of both knees.  No erythema.  No effusion.  No break in skin integrity.  Range 

of motion is essentially full both actively and passively.  No distal proximal 

symptomology.  Pedal pulses intact.).  Absent: pedal edema, joint swelling, calf

 tenderness


Back exam: Present: normal inspection


Neurological exam: Present: alert, oriented X3, CN II-XII intact


Psychiatric exam: Present: normal affect, normal mood


Skin exam: Present: warm, dry, intact, normal color.  Absent: rash





Course


                                   Vital Signs











  03/16/22 03/16/22





  20:50 22:10


 


Temperature  98.6 F


 


Pulse Rate 78 


 


Respiratory 16 





Rate  


 


Blood Pressure 119/62 


 


O2 Sat by Pulse 98 





Oximetry  














- Reevaluation(s)


Reevaluation #1: 





03/17/22 00:24


Medical record is reviewed


Symptoms are improved here in the emergency department


Patient is informed of results and questions answered


Patient in no distress





Patient resting comfortably in bed.  Vital signs stable, patient afebrile.  No 

distress.  Pain improved.





Medical Decision Making





- Medical Decision Making





Patient presents to the wrist with what appears to be arthritic changes 

involving both knees.  He is asymptomatic otherwise.  Does not appear to be ill 

or toxic.  Plain film x-rays ordered.  I did give 1 dose of ketorolac and one 

dose of tramadol.  I did review the patient's previous laboratory values a has 

good renal function.  No history of seizure disorder.








Follow-up with your regular physician as directed.  Return to the ER immediately

 if any symptoms worsen, new symptoms arise, or any other problems develop.





Blood sugar was elevated, however, patient was asymptomatic.  Patient did not 

have any appearance of infectious etiology.  Presentation is consistent with 

DVT.  Pain was in the knees.  No evidence of effusion.  Pain likely due to 

osteoarthritis.





Patient was told to return to the ER for any signs or symptoms worsen.  Told to 

return immediately if any other problems arise.  All questions answered.  

Treatment plan discussed.  Patient in agreement


Every effort has been made to ensure accuracy of this dictation.  However, due 

to the limitations of electronic medical records and dictation devices, errors 

in charting still occur.





We'll call the facility and have the patient transferred back.  Patient to 

follow-up with his regular doctor for further pain control





- Lab Data


                                   Lab Results











  03/16/22 Range/Units





  22:08 


 


POC Glucose (mg/dL)  259 H  (75-99)  mg/dL


 


POC Glu Operater Vickey Phillips  














- Radiology Data


Radiology results: report reviewed (No acute findings as read by radiology.  I 

did review these films myself.), image reviewed





Disposition


Clinical Impression: 


 Pain in both knees, Osteoarthritis of both knees





Disposition: HOME SELF-CARE


Condition: Good


Instructions (If sedation given, give patient instructions):  Osteoarthritis 

(ED)


Additional Instructions: 


Continue Tylenol for pain control.Follow-up with her regular physician for 

reevaluation.


Is patient prescribed a controlled substance at d/c from ED?: No


Referrals: 


Grover Champagne MD [Primary Care Provider] - 1-2 days


Time of Disposition: 00:21

## 2022-03-17 VITALS
HEART RATE: 81 BPM | DIASTOLIC BLOOD PRESSURE: 74 MMHG | SYSTOLIC BLOOD PRESSURE: 118 MMHG | TEMPERATURE: 98.1 F | RESPIRATION RATE: 18 BRPM

## 2022-06-25 ENCOUNTER — HOSPITAL ENCOUNTER (INPATIENT)
Dept: HOSPITAL 47 - EC | Age: 77
LOS: 12 days | Discharge: NURSING, REHAB OR LONG-TERM CARE | DRG: 177 | End: 2022-07-07
Attending: INTERNAL MEDICINE | Admitting: INTERNAL MEDICINE
Payer: MEDICARE

## 2022-06-25 VITALS — BODY MASS INDEX: 32.2 KG/M2

## 2022-06-25 DIAGNOSIS — I11.0: ICD-10-CM

## 2022-06-25 DIAGNOSIS — I50.23: ICD-10-CM

## 2022-06-25 DIAGNOSIS — R33.9: ICD-10-CM

## 2022-06-25 DIAGNOSIS — Z79.02: ICD-10-CM

## 2022-06-25 DIAGNOSIS — Z79.899: ICD-10-CM

## 2022-06-25 DIAGNOSIS — E22.2: ICD-10-CM

## 2022-06-25 DIAGNOSIS — E03.9: ICD-10-CM

## 2022-06-25 DIAGNOSIS — E86.1: ICD-10-CM

## 2022-06-25 DIAGNOSIS — R33.8: ICD-10-CM

## 2022-06-25 DIAGNOSIS — I47.1: ICD-10-CM

## 2022-06-25 DIAGNOSIS — E78.5: ICD-10-CM

## 2022-06-25 DIAGNOSIS — J44.9: ICD-10-CM

## 2022-06-25 DIAGNOSIS — I21.4: ICD-10-CM

## 2022-06-25 DIAGNOSIS — F03.90: ICD-10-CM

## 2022-06-25 DIAGNOSIS — I25.5: ICD-10-CM

## 2022-06-25 DIAGNOSIS — N40.1: ICD-10-CM

## 2022-06-25 DIAGNOSIS — J96.01: ICD-10-CM

## 2022-06-25 DIAGNOSIS — Z79.4: ICD-10-CM

## 2022-06-25 DIAGNOSIS — E11.9: ICD-10-CM

## 2022-06-25 DIAGNOSIS — R13.10: ICD-10-CM

## 2022-06-25 DIAGNOSIS — J69.0: Primary | ICD-10-CM

## 2022-06-25 DIAGNOSIS — K21.9: ICD-10-CM

## 2022-06-25 DIAGNOSIS — Z79.82: ICD-10-CM

## 2022-06-25 DIAGNOSIS — Z86.73: ICD-10-CM

## 2022-06-25 DIAGNOSIS — Z66: ICD-10-CM

## 2022-06-25 DIAGNOSIS — G93.41: ICD-10-CM

## 2022-06-25 LAB
ALBUMIN SERPL-MCNC: 3.5 G/DL (ref 3.5–5)
ALP SERPL-CCNC: 92 U/L (ref 38–126)
ALT SERPL-CCNC: 21 U/L (ref 4–49)
ANION GAP SERPL CALC-SCNC: 9 MMOL/L
APTT BLD: 22.1 SEC (ref 22–30)
AST SERPL-CCNC: 23 U/L (ref 17–59)
BASOPHILS # BLD AUTO: 0.1 K/UL (ref 0–0.2)
BASOPHILS NFR BLD AUTO: 0 %
BUN SERPL-SCNC: 13 MG/DL (ref 9–20)
CALCIUM SPEC-MCNC: 8.6 MG/DL (ref 8.4–10.2)
CHLORIDE SERPL-SCNC: 91 MMOL/L (ref 98–107)
CO2 SERPL-SCNC: 23 MMOL/L (ref 22–30)
EOSINOPHIL # BLD AUTO: 0.2 K/UL (ref 0–0.7)
EOSINOPHIL NFR BLD AUTO: 1 %
ERYTHROCYTE [DISTWIDTH] IN BLOOD BY AUTOMATED COUNT: 4.09 M/UL (ref 4.3–5.9)
ERYTHROCYTE [DISTWIDTH] IN BLOOD: 13.6 % (ref 11.5–15.5)
GLUCOSE BLD-MCNC: 224 MG/DL (ref 70–110)
GLUCOSE BLD-MCNC: 265 MG/DL (ref 70–110)
GLUCOSE SERPL-MCNC: 223 MG/DL (ref 74–99)
HCT VFR BLD AUTO: 37.3 % (ref 39–53)
HGB BLD-MCNC: 12.5 GM/DL (ref 13–17.5)
INR PPP: 1 (ref ?–1.2)
LYMPHOCYTES # SPEC AUTO: 1.5 K/UL (ref 1–4.8)
LYMPHOCYTES NFR SPEC AUTO: 9 %
MAGNESIUM SPEC-SCNC: 1.7 MG/DL (ref 1.6–2.3)
MCH RBC QN AUTO: 30.7 PG (ref 25–35)
MCHC RBC AUTO-ENTMCNC: 33.6 G/DL (ref 31–37)
MCV RBC AUTO: 91.3 FL (ref 80–100)
MONOCYTES # BLD AUTO: 0.8 K/UL (ref 0–1)
MONOCYTES NFR BLD AUTO: 5 %
NEUTROPHILS # BLD AUTO: 13.5 K/UL (ref 1.3–7.7)
NEUTROPHILS NFR BLD AUTO: 84 %
PLATELET # BLD AUTO: 317 K/UL (ref 150–450)
POTASSIUM SERPL-SCNC: 5 MMOL/L (ref 3.5–5.1)
PROT SERPL-MCNC: 6.1 G/DL (ref 6.3–8.2)
PT BLD: 10.4 SEC (ref 9–12)
SODIUM SERPL-SCNC: 123 MMOL/L (ref 137–145)
WBC # BLD AUTO: 16.1 K/UL (ref 3.8–10.6)

## 2022-06-25 PROCEDURE — 83930 ASSAY OF BLOOD OSMOLALITY: CPT

## 2022-06-25 PROCEDURE — 80061 LIPID PANEL: CPT

## 2022-06-25 PROCEDURE — 85730 THROMBOPLASTIN TIME PARTIAL: CPT

## 2022-06-25 PROCEDURE — 74230 X-RAY XM SWLNG FUNCJ C+: CPT

## 2022-06-25 PROCEDURE — 84443 ASSAY THYROID STIM HORMONE: CPT

## 2022-06-25 PROCEDURE — 83935 ASSAY OF URINE OSMOLALITY: CPT

## 2022-06-25 PROCEDURE — 85610 PROTHROMBIN TIME: CPT

## 2022-06-25 PROCEDURE — 36410 VNPNXR 3YR/> PHY/QHP DX/THER: CPT

## 2022-06-25 PROCEDURE — 96365 THER/PROPH/DIAG IV INF INIT: CPT

## 2022-06-25 PROCEDURE — 94660 CPAP INITIATION&MGMT: CPT

## 2022-06-25 PROCEDURE — 85027 COMPLETE CBC AUTOMATED: CPT

## 2022-06-25 PROCEDURE — 99291 CRITICAL CARE FIRST HOUR: CPT

## 2022-06-25 PROCEDURE — 83605 ASSAY OF LACTIC ACID: CPT

## 2022-06-25 PROCEDURE — 87040 BLOOD CULTURE FOR BACTERIA: CPT

## 2022-06-25 PROCEDURE — 93005 ELECTROCARDIOGRAM TRACING: CPT

## 2022-06-25 PROCEDURE — 84100 ASSAY OF PHOSPHORUS: CPT

## 2022-06-25 PROCEDURE — 80053 COMPREHEN METABOLIC PANEL: CPT

## 2022-06-25 PROCEDURE — 83036 HEMOGLOBIN GLYCOSYLATED A1C: CPT

## 2022-06-25 PROCEDURE — 96375 TX/PRO/DX INJ NEW DRUG ADDON: CPT

## 2022-06-25 PROCEDURE — 94760 N-INVAS EAR/PLS OXIMETRY 1: CPT

## 2022-06-25 PROCEDURE — 94640 AIRWAY INHALATION TREATMENT: CPT

## 2022-06-25 PROCEDURE — 83880 ASSAY OF NATRIURETIC PEPTIDE: CPT

## 2022-06-25 PROCEDURE — 93306 TTE W/DOPPLER COMPLETE: CPT

## 2022-06-25 PROCEDURE — 83735 ASSAY OF MAGNESIUM: CPT

## 2022-06-25 PROCEDURE — 80048 BASIC METABOLIC PNL TOTAL CA: CPT

## 2022-06-25 PROCEDURE — 84300 ASSAY OF URINE SODIUM: CPT

## 2022-06-25 PROCEDURE — 85025 COMPLETE CBC W/AUTO DIFF WBC: CPT

## 2022-06-25 PROCEDURE — 36415 COLL VENOUS BLD VENIPUNCTURE: CPT

## 2022-06-25 PROCEDURE — 76937 US GUIDE VASCULAR ACCESS: CPT

## 2022-06-25 PROCEDURE — 80051 ELECTROLYTE PANEL: CPT

## 2022-06-25 PROCEDURE — 84484 ASSAY OF TROPONIN QUANT: CPT

## 2022-06-25 PROCEDURE — 71045 X-RAY EXAM CHEST 1 VIEW: CPT

## 2022-06-25 RX ADMIN — IPRATROPIUM BROMIDE AND ALBUTEROL SULFATE SCH ML: .5; 3 SOLUTION RESPIRATORY (INHALATION) at 19:25

## 2022-06-25 RX ADMIN — INSULIN ASPART SCH UNIT: 100 INJECTION, SOLUTION INTRAVENOUS; SUBCUTANEOUS at 17:00

## 2022-06-25 RX ADMIN — INSULIN ASPART SCH UNIT: 100 INJECTION, SOLUTION INTRAVENOUS; SUBCUTANEOUS at 20:24

## 2022-06-25 NOTE — P.HPIM
History of Present Illness


H&P Date: 06/25/22


Chief Complaint: choking





77-year-old male with history of multiple medical problems including, diabetes 

type 2, hypertension, history of CVA, GERD/Reflux, Hyperlipidemia, hypo

thyroidism presented to the emergency department from the nursing home after he 

choked on a piece of meat.  When he presented to the ER he was found to be 

hypoxic in the 60s according to the emergency physician, he was placed on 

nonrebreather mask which brought up was oxygen saturation is up to the 90s.  

Patient is currently a poor historian, he knows where he is but he is not able 

to specify why he is in the emergency department.  He states that he has 

shortness of breath and chest pain. No fevers. Stepdaughter at the bedside 

stated that he was not sick prior to this happening.  She stated that he used to

be in an assisted living facility with a due to an old ankle fracture his 

mobility is limited and eventually had to go to a nursing home.  





Vitals in the emergency department showed saturation of 92% on 100% 

nonrebreather mask.  Rest of vitals okay.  Chest x-ray showed some interstitial 

edema consistent with congestive heart failure. Labs showed Na 123, BNP 2250, 

WBC 16, elevated lactate at 3. 





Review of Systems





Unobtainable due to confusion





Past Medical History


Past Medical History: CVA/TIA, Diabetes Mellitus, GERD/Reflux, Hyperlipidemia, 

Hypertension, Thyroid Disorder


Additional Past Medical History / Comment(s): States had a stroke left arm and 

leg numbness


History of Any Multi-Drug Resistant Organisms: None Reported


Additional Past Surgical History / Comment(s): cataract surgery


Past Anesthesia/Blood Transfusion Reactions: No Reported Reaction


Past Psychological History: No Psychological Hx Reported


Smoking Status: Never smoker


Past Alcohol Use History: None Reported


Past Drug Use History: None Reported





- Past Family History


  ** Mother


Family Medical History: Diabetes Mellitus





Medications and Allergies


                                Home Medications











 Medication  Instructions  Recorded  Confirmed  Type


 


Thyroid,Pork [[place] Thyroid] 90 mg PO DAILY@0800 06/27/17 03/16/22 History


 


Aspirin EC [Ecotrin Low Dose] 81 mg PO HS@2000 06/04/21 03/16/22 History


 


DULoxetine HCL [Cymbalta] 30 mg PO DAILY@0800 06/04/21 03/16/22 History


 


Finasteride [Proscar] 5 mg PO HS@2000 06/04/21 03/16/22 History


 


Nitroglycerin Sl Tabs [Nitrostat] 0.4 mg SL Q5M PRN 06/04/21 03/16/22 History


 


Sodium Chloride [Ocean] 2 spr EA NOSTRIL AS DIRECTED PRN 06/04/21 03/16/22 

History


 


Tamsulosin HCl [Flomax] 0.8 mg PO DAILY@0800 06/04/21 03/16/22 History


 


glipiZIDE XL [Glucotrol Xl] 5 mg PO DAILY@0600 06/04/21 03/16/22 History


 


lisinopriL 40 mg PO BID@0800,2000 06/04/21 03/16/22 History


 


phenoL [Chloraseptic] 1 spray MUCOUS MEM AS DIRECTED PRN 06/04/21 03/16/22 

History


 


Acetaminophen Tab [Tylenol] 325 mg PO Q6HR PRN  tab 06/09/21 03/16/22 Rx


 


Atorvastatin [Lipitor] 40 mg PO HS@2000 01/18/22 03/16/22 History


 


Benzocaine/Menthol [Cepacol Sore 1 lozenge MUCOUS MEM AS DIRECTED 01/18/22 03/16/22 History





Throat Lozenge] PRN   


 


Diltiazem Cd [Cardizem CD] 120 mg PO DAILY@0800 01/18/22 03/16/22 History


 


Furosemide [Lasix] 20 mg PO DAILY@0800 01/18/22 03/16/22 History


 


Ipratropium Bromide [Ipratropium 2 spr EA NOSTRIL TID@0800,1400,2000 01/18/22 03/16/22 History





Bromide 0.03%]    


 


Multivit-Min/FA/Lycopen/Lutein 1 tab PO DAILY@0800 01/18/22 03/16/22 History





[Centrum Silver Tablet]    


 


Sennosides/Docusate Sodium [Senna 2 tab PO HS@2000 01/18/22 03/16/22 History





Plus 8.6-50 mg Tablet]    


 


Simethicone [Mylanta Gas Minis] 125 mg PO QID PRN 01/18/22 03/16/22 History


 


Sodium Chloride Tab 1 gm PO DAILY@0800 01/18/22 03/16/22 History


 


metFORMIN HCL ER [Glucophage XR] 500 mg PO BID@0800,2000 01/18/22 03/16/22 

History


 


sitaGLIPtin [Januvia] 100 mg PO DAILY@0800 01/18/22 03/16/22 History


 


Carboxymethylcellulose Sodium 1 - 2 drops BOTH EYES AS DIRECTED 03/16/22 03/16/22 History





[Refresh Tears] PRN   


 


Ciprofloxacin HCl [Cipro] 500 mg PO BID@0800,2000 03/16/22 03/16/22 History


 


Insulin Detemir (Levemir) [Levemir] 12 unit SQ HS@2000 03/16/22 03/16/22 History








                                    Allergies











Allergy/AdvReac Type Severity Reaction Status Date / Time


 


No Known Allergies Allergy   Verified 03/16/22 22:36














Physical Exam


Vitals: 


                                   Vital Signs











  Temp Pulse Resp BP Pulse Ox


 


 06/25/22 15:30   98   


 


 06/25/22 15:03   99   


 


 06/25/22 14:38    30 H  


 


 06/25/22 14:32  98.1 F  100  22  124/60  97








                                Intake and Output











 06/25/22 06/25/22 06/25/22





 06:59 14:59 22:59


 


Other:   


 


  Weight  99.79 kg 














Constitutional: No acute distress, conversant, pleasant


Eyes:Anicteric sclerae, moist conjunctiva, no lid-lag, PERRLA, 


ENMT: Oropharynx clear, no erythema, exudates


Neck: Supple, FROM, no masses, or JVD, No carotid bruits, No thyromegaly


Lungs: Clear to auscultation, Clear to percussion, Normal respiratory effort, no

 accessory muscle use 


Cardiovascular: Heart regular in rate and rhythm, No murmurs, gallops, or rubs, 

No peripheral edema


Abdominal: Soft, Nontender, no guarding, rebound or rigidity, Normoactive bowel 

sounds, No hepatomegaly, No splenomegaly, No palpable mass 


Skin: Normal temperature, tone, texture, turgor, no induration, No subcutaneous 

nodules, No rash, lesions, No ulcers


Extremities: No digital cyanosis, No clubbing, Pedal pulses intact and 

symmetrical, Radial pulses intact and symmetrical, No calf tenderness          


Neuro: Muscles Strength 5/5 in all 4 extremities, Sensation to light touch 

grossly present throughout, Cranial nerves II-XII grossly intact, no focal 

sensory deficits








Results


CBC & Chem 7: 


                                 06/25/22 14:44





                                 06/25/22 14:44


Labs: 


                  Abnormal Lab Results - Last 24 Hours (Table)











  06/25/22 06/25/22 06/25/22 Range/Units





  14:44 14:44 14:44 


 


WBC  16.1 H    (3.8-10.6)  k/uL


 


RBC  4.09 L    (4.30-5.90)  m/uL


 


Hgb  12.5 L    (13.0-17.5)  gm/dL


 


Hct  37.3 L    (39.0-53.0)  %


 


Neutrophils #  13.5 H    (1.3-7.7)  k/uL


 


Sodium   123 L   (137-145)  mmol/L


 


Chloride   91 L   ()  mmol/L


 


Glucose   223 H   (74-99)  mg/dL


 


Plasma Lactic Acid Kirk    3.2 H*  (0.7-2.0)  mmol/L


 


Total Protein   6.1 L   (6.3-8.2)  g/dL














Assessment and Plan


Plan: 





Acute hypoxic respiratory failure


Aspiration pneumonia


Likely sec to the aspiration event


NPO, swallow eval once better


Admit to ICU


NRB to keep sats above 92%


Zosyn


Pulm to evaluate





DM 2


Check A1c


SSI


Hold lantus and oral hypoglycemics as he is NPO





GERD/Reflux, 


Hyperlipidemia, 


Hypertension, 


Hypothyroidism


Hold all oral meds for now


Monitor BP, will give IV bp meds if needed 





DVT prophylaxis:


Lovenox 





Admit to ICU, inpatient expected length of stay more than 2 midnights

## 2022-06-25 NOTE — XR
EXAMINATION TYPE: XR chest 1V portable

 

DATE OF EXAM: 6/25/2022

 

COMPARISON: 6/4/2021

 

HISTORY: Difficulty breathing

 

TECHNIQUE: Single view

 

FINDINGS: Heart is enlarged. There is some pulmonary interstitial and airspace edema. There are chest
 leads. Costophrenic angles are clear.

 

IMPRESSION: There is evidence of congestive heart failure with some pulmonary edema that is mostly ne
w than last exam.

## 2022-06-25 NOTE — ED
General Adult HPI





- General


Chief complaint: Shortness of Breath


Stated complaint: KAI


Time Seen by Provider: 06/25/22 14:28


Source: patient, EMS, RN notes reviewed, old records reviewed


Mode of arrival: EMS


Limitations: altered mental status





- History of Present Illness


Initial comments: 





77-year-old male presents from nursing home with suspected aspiration.  Patient 

was eating a french fries and sloppy Yusuf.  He apparently had aspirated according

to staff and became acutely hypoxic.  Paramedics arrived and the patient was in 

the 60s 4 pulse ox.  There was no preceding symptoms according to the 

paramedics.  This was an acute event.  Patient is unable to give a detailed 

history due to respiratory distress.





- Related Data


                                Home Medications











 Medication  Instructions  Recorded  Confirmed


 


Thyroid,Pork [[place] Thyroid] 90 mg PO DAILY@0800 06/27/17 03/16/22


 


Aspirin EC [Ecotrin Low Dose] 81 mg PO HS@2000 06/04/21 03/16/22


 


DULoxetine HCL [Cymbalta] 30 mg PO DAILY@0800 06/04/21 03/16/22


 


Finasteride [Proscar] 5 mg PO HS@2000 06/04/21 03/16/22


 


Nitroglycerin Sl Tabs [Nitrostat] 0.4 mg SL Q5M PRN 06/04/21 03/16/22


 


Sodium Chloride [[place]] 2 spr EA NOSTRIL AS DIRECTED PRN 06/04/21 03/16/22


 


Tamsulosin HCl [Flomax] 0.8 mg PO DAILY@0800 06/04/21 03/16/22


 


glipiZIDE XL [Glucotrol Xl] 5 mg PO DAILY@0600 06/04/21 03/16/22


 


lisinopriL 40 mg PO BID@0800,2000 06/04/21 03/16/22


 


phenoL [Chloraseptic] 1 spray MUCOUS MEM AS DIRECTED PRN 06/04/21 03/16/22


 


Atorvastatin [Lipitor] 40 mg PO HS@2000 01/18/22 03/16/22


 


Benzocaine/Menthol [Cepacol Sore 1 lozenge MUCOUS MEM AS DIRECTED 01/18/22 03/16/22





Throat Lozenge] PRN  


 


Diltiazem Cd [Cardizem CD] 120 mg PO DAILY@0800 01/18/22 03/16/22


 


Furosemide [Lasix] 20 mg PO DAILY@0800 01/18/22 03/16/22


 


Ipratropium Bromide [Ipratropium 2 spr EA NOSTRIL TID@0800,1400,2000 01/18/22 03/16/22





Bromide 0.03%]   


 


Multivit-Min/FA/Lycopen/Lutein 1 tab PO DAILY@0800 01/18/22 03/16/22





[Centrum Silver Tablet]   


 


Sennosides/Docusate Sodium [Senna 2 tab PO HS@2000 01/18/22 03/16/22





Plus 8.6-50 mg Tablet]   


 


Simethicone [Mylanta Gas Minis] 125 mg PO QID PRN 01/18/22 03/16/22


 


Sodium Chloride Tab 1 gm PO DAILY@0800 01/18/22 03/16/22


 


metFORMIN HCL ER [Glucophage XR] 500 mg PO BID@0800,2000 01/18/22 03/16/22


 


sitaGLIPtin [Januvia] 100 mg PO DAILY@0800 01/18/22 03/16/22


 


Carboxymethylcellulose Sodium 1 - 2 drops BOTH EYES AS DIRECTED 03/16/22 03/16/22





[Refresh Tears] PRN  


 


Ciprofloxacin HCl [Cipro] 500 mg PO BID@0800,2000 03/16/22 03/16/22


 


Insulin Detemir (Levemir) [Levemir] 12 unit SQ HS@2000 03/16/22 03/16/22








                                  Previous Rx's











 Medication  Instructions  Recorded


 


Acetaminophen Tab [Tylenol] 325 mg PO Q6HR PRN  tab 06/09/21











                                    Allergies











Allergy/AdvReac Type Severity Reaction Status Date / Time


 


No Known Allergies Allergy   Verified 03/16/22 22:36














Review of Systems


ROS Statement: 


Those systems with pertinent positive or pertinent negative responses have been 

documented in the HPI.





ROS Other: All systems not noted in ROS Statement are negative.





Past Medical History


Past Medical History: CVA/TIA, Diabetes Mellitus, GERD/Reflux, Hyperlipidemia, 

Hypertension, Thyroid Disorder


Additional Past Medical History / Comment(s): States had a stroke left arm and 

leg numbness


History of Any Multi-Drug Resistant Organisms: None Reported


Additional Past Surgical History / Comment(s): cataract surgery


Past Anesthesia/Blood Transfusion Reactions: No Reported Reaction


Past Psychological History: No Psychological Hx Reported


Smoking Status: Never smoker


Past Alcohol Use History: None Reported


Past Drug Use History: None Reported





- Past Family History


  ** Mother


Family Medical History: Diabetes Mellitus





General Exam


Limitations: no limitations


General appearance: lethargic, in distress


Head exam: Present: atraumatic, normocephalic


Eye exam: Present: normal appearance, PERRL


ENT exam: Present: normal exam, normal oropharynx


Neck exam: Present: normal inspection.  Absent: tenderness


Respiratory exam: Present: respiratory distress, rhonchi, accessory muscle use


Cardiovascular Exam: Present: regular rate, normal rhythm


GI/Abdominal exam: Present: soft.  Absent: distended, tenderness, guarding


Extremities exam: Present: pedal edema


Neurological exam: Present: alert


Psychiatric exam: Present: normal affect, normal mood


Skin exam: Present: warm, dry, intact.  Absent: cyanosis, diaphoretic





Course


                                   Vital Signs











  06/25/22 06/25/22 06/25/22





  14:32 14:38 15:03


 


Temperature 98.1 F  


 


Pulse Rate 100  99


 


Respiratory 22 30 H 





Rate   


 


Blood Pressure 124/60  


 


O2 Sat by Pulse 97  





Oximetry   














  06/25/22





  15:30


 


Temperature 


 


Pulse Rate 98


 


Respiratory 





Rate 


 


Blood Pressure 


 


O2 Sat by Pulse 





Oximetry 














EKG Findings





- EKG Comments:


EKG Findings:: Sinus tachycardia low voltage, rate of 101, GA interval 188, QRS 

duration 104,  no ST segment elevation.





Medical Decision Making





- Medical Decision Making





17-year-old male presenting in respiratory distress, suspected aspiration.  

Chest x-ray does show bilateral edema or infiltrate.  This may be pneumonitis 

secondary to aspiration.  He started on IV antibiotics.  Given albuterol and 

Atrovent.  I discussed case with Dr. Corado covering for the ICU.  He will admit 

the patient in the ICU and monitored closely.  The patient is a DO NOT RESUS

CITATE but did indicate to me that if need be he would be intubated.  Case 

discussed with [facility] who will admit.





- Lab Data


Result diagrams: 


                                 06/25/22 14:44





                                 06/25/22 14:44


                                   Lab Results











  06/25/22 06/25/22 06/25/22 Range/Units





  14:44 14:44 14:44 


 


WBC  16.1 H    (3.8-10.6)  k/uL


 


RBC  4.09 L    (4.30-5.90)  m/uL


 


Hgb  12.5 L    (13.0-17.5)  gm/dL


 


Hct  37.3 L    (39.0-53.0)  %


 


MCV  91.3    (80.0-100.0)  fL


 


MCH  30.7    (25.0-35.0)  pg


 


MCHC  33.6    (31.0-37.0)  g/dL


 


RDW  13.6    (11.5-15.5)  %


 


Plt Count  317    (150-450)  k/uL


 


MPV  8.1    


 


Neutrophils %  84    %


 


Lymphocytes %  9    %


 


Monocytes %  5    %


 


Eosinophils %  1    %


 


Basophils %  0    %


 


Neutrophils #  13.5 H    (1.3-7.7)  k/uL


 


Lymphocytes #  1.5    (1.0-4.8)  k/uL


 


Monocytes #  0.8    (0-1.0)  k/uL


 


Eosinophils #  0.2    (0-0.7)  k/uL


 


Basophils #  0.1    (0-0.2)  k/uL


 


PT   10.4   (9.0-12.0)  sec


 


INR   1.0   (<1.2)  


 


APTT   22.1   (22.0-30.0)  sec


 


Sodium    123 L  (137-145)  mmol/L


 


Potassium    5.0  (3.5-5.1)  mmol/L


 


Chloride    91 L  ()  mmol/L


 


Carbon Dioxide    23  (22-30)  mmol/L


 


Anion Gap    9  mmol/L


 


BUN    13  (9-20)  mg/dL


 


Creatinine    0.75  (0.66-1.25)  mg/dL


 


Est GFR (CKD-EPI)AfAm    >90  (>60 ml/min/1.73 sqM)  


 


Est GFR (CKD-EPI)NonAf    87  (>60 ml/min/1.73 sqM)  


 


Glucose    223 H  (74-99)  mg/dL


 


Plasma Lactic Acid Kirk     (0.7-2.0)  mmol/L


 


Calcium    8.6  (8.4-10.2)  mg/dL


 


Magnesium    1.7  (1.6-2.3)  mg/dL


 


Total Bilirubin    0.6  (0.2-1.3)  mg/dL


 


AST    23  (17-59)  U/L


 


ALT    21  (4-49)  U/L


 


Alkaline Phosphatase    92  ()  U/L


 


NT-Pro-B Natriuret Pep     pg/mL


 


Total Protein    6.1 L  (6.3-8.2)  g/dL


 


Albumin    3.5  (3.5-5.0)  g/dL














  06/25/22 06/25/22 Range/Units





  14:44 14:44 


 


WBC    (3.8-10.6)  k/uL


 


RBC    (4.30-5.90)  m/uL


 


Hgb    (13.0-17.5)  gm/dL


 


Hct    (39.0-53.0)  %


 


MCV    (80.0-100.0)  fL


 


MCH    (25.0-35.0)  pg


 


MCHC    (31.0-37.0)  g/dL


 


RDW    (11.5-15.5)  %


 


Plt Count    (150-450)  k/uL


 


MPV    


 


Neutrophils %    %


 


Lymphocytes %    %


 


Monocytes %    %


 


Eosinophils %    %


 


Basophils %    %


 


Neutrophils #    (1.3-7.7)  k/uL


 


Lymphocytes #    (1.0-4.8)  k/uL


 


Monocytes #    (0-1.0)  k/uL


 


Eosinophils #    (0-0.7)  k/uL


 


Basophils #    (0-0.2)  k/uL


 


PT    (9.0-12.0)  sec


 


INR    (<1.2)  


 


APTT    (22.0-30.0)  sec


 


Sodium    (137-145)  mmol/L


 


Potassium    (3.5-5.1)  mmol/L


 


Chloride    ()  mmol/L


 


Carbon Dioxide    (22-30)  mmol/L


 


Anion Gap    mmol/L


 


BUN    (9-20)  mg/dL


 


Creatinine    (0.66-1.25)  mg/dL


 


Est GFR (CKD-EPI)AfAm    (>60 ml/min/1.73 sqM)  


 


Est GFR (CKD-EPI)NonAf    (>60 ml/min/1.73 sqM)  


 


Glucose    (74-99)  mg/dL


 


Plasma Lactic Acid Kirk  3.2 H*   (0.7-2.0)  mmol/L


 


Calcium    (8.4-10.2)  mg/dL


 


Magnesium    (1.6-2.3)  mg/dL


 


Total Bilirubin    (0.2-1.3)  mg/dL


 


AST    (17-59)  U/L


 


ALT    (4-49)  U/L


 


Alkaline Phosphatase    ()  U/L


 


NT-Pro-B Natriuret Pep   2250  pg/mL


 


Total Protein    (6.3-8.2)  g/dL


 


Albumin    (3.5-5.0)  g/dL














Critical Care Time


Critical Care Time: Yes


Total Critical Care Time: 35





Disposition


Clinical Impression: 


 Aspiration pneumonitis, Hypoxia





Disposition: ADMITTED AS IP TO THIS HOSP


Condition: Serious


Is patient prescribed a controlled substance at d/c from ED?: No


Referrals: 


None,Stated [Primary Care Provider] - 1-2 days


Time of Disposition: 15:39

## 2022-06-26 LAB
ALBUMIN SERPL-MCNC: 3.1 G/DL (ref 3.5–5)
ALP SERPL-CCNC: 71 U/L (ref 38–126)
ALT SERPL-CCNC: 23 U/L (ref 4–49)
ANION GAP SERPL CALC-SCNC: 7 MMOL/L
AST SERPL-CCNC: 84 U/L (ref 17–59)
BASOPHILS # BLD AUTO: 0 K/UL (ref 0–0.2)
BASOPHILS NFR BLD AUTO: 0 %
BUN SERPL-SCNC: 18 MG/DL (ref 9–20)
CALCIUM SPEC-MCNC: 8.2 MG/DL (ref 8.4–10.2)
CHLORIDE SERPL-SCNC: 98 MMOL/L (ref 98–107)
CO2 SERPL-SCNC: 20 MMOL/L (ref 22–30)
EOSINOPHIL # BLD AUTO: 0.1 K/UL (ref 0–0.7)
EOSINOPHIL NFR BLD AUTO: 1 %
ERYTHROCYTE [DISTWIDTH] IN BLOOD BY AUTOMATED COUNT: 3.88 M/UL (ref 4.3–5.9)
ERYTHROCYTE [DISTWIDTH] IN BLOOD: 13.7 % (ref 11.5–15.5)
GLUCOSE BLD-MCNC: 229 MG/DL (ref 70–110)
GLUCOSE BLD-MCNC: 249 MG/DL (ref 70–110)
GLUCOSE BLD-MCNC: 296 MG/DL (ref 70–110)
GLUCOSE BLD-MCNC: 301 MG/DL (ref 70–110)
GLUCOSE SERPL-MCNC: 242 MG/DL (ref 74–99)
HCT VFR BLD AUTO: 36 % (ref 39–53)
HGB BLD-MCNC: 12 GM/DL (ref 13–17.5)
LYMPHOCYTES # SPEC AUTO: 1.8 K/UL (ref 1–4.8)
LYMPHOCYTES NFR SPEC AUTO: 11 %
MAGNESIUM SPEC-SCNC: 1.7 MG/DL (ref 1.6–2.3)
MCH RBC QN AUTO: 31 PG (ref 25–35)
MCHC RBC AUTO-ENTMCNC: 33.4 G/DL (ref 31–37)
MCV RBC AUTO: 92.6 FL (ref 80–100)
MONOCYTES # BLD AUTO: 0.4 K/UL (ref 0–1)
MONOCYTES NFR BLD AUTO: 3 %
NEUTROPHILS # BLD AUTO: 13.7 K/UL (ref 1.3–7.7)
NEUTROPHILS NFR BLD AUTO: 86 %
PLATELET # BLD AUTO: 242 K/UL (ref 150–450)
POTASSIUM SERPL-SCNC: 4.9 MMOL/L (ref 3.5–5.1)
PROT SERPL-MCNC: 5.6 G/DL (ref 6.3–8.2)
SODIUM SERPL-SCNC: 125 MMOL/L (ref 137–145)
WBC # BLD AUTO: 16.1 K/UL (ref 3.8–10.6)

## 2022-06-26 RX ADMIN — IPRATROPIUM BROMIDE AND ALBUTEROL SULFATE SCH ML: .5; 3 SOLUTION RESPIRATORY (INHALATION) at 11:54

## 2022-06-26 RX ADMIN — INSULIN DETEMIR SCH: 100 INJECTION, SOLUTION SUBCUTANEOUS at 16:38

## 2022-06-26 RX ADMIN — IPRATROPIUM BROMIDE AND ALBUTEROL SULFATE SCH ML: .5; 3 SOLUTION RESPIRATORY (INHALATION) at 09:39

## 2022-06-26 RX ADMIN — PIPERACILLIN AND TAZOBACTAM SCH MLS/HR: 3; .375 INJECTION, POWDER, FOR SOLUTION INTRAVENOUS at 08:16

## 2022-06-26 RX ADMIN — INSULIN ASPART SCH UNIT: 100 INJECTION, SOLUTION INTRAVENOUS; SUBCUTANEOUS at 13:01

## 2022-06-26 RX ADMIN — CEFAZOLIN SCH MLS/HR: 330 INJECTION, POWDER, FOR SOLUTION INTRAMUSCULAR; INTRAVENOUS at 09:31

## 2022-06-26 RX ADMIN — ENOXAPARIN SODIUM SCH MG: 40 INJECTION SUBCUTANEOUS at 09:31

## 2022-06-26 RX ADMIN — IPRATROPIUM BROMIDE AND ALBUTEROL SULFATE SCH ML: .5; 3 SOLUTION RESPIRATORY (INHALATION) at 15:19

## 2022-06-26 RX ADMIN — INSULIN ASPART SCH UNIT: 100 INJECTION, SOLUTION INTRAVENOUS; SUBCUTANEOUS at 21:18

## 2022-06-26 RX ADMIN — PIPERACILLIN AND TAZOBACTAM SCH MLS/HR: 3; .375 INJECTION, POWDER, FOR SOLUTION INTRAVENOUS at 16:17

## 2022-06-26 RX ADMIN — CEFAZOLIN SCH MLS/HR: 330 INJECTION, POWDER, FOR SOLUTION INTRAMUSCULAR; INTRAVENOUS at 21:32

## 2022-06-26 RX ADMIN — FAMOTIDINE SCH MG: 10 INJECTION, SOLUTION INTRAVENOUS at 09:31

## 2022-06-26 RX ADMIN — PIPERACILLIN AND TAZOBACTAM SCH MLS/HR: 3; .375 INJECTION, POWDER, FOR SOLUTION INTRAVENOUS at 00:39

## 2022-06-26 RX ADMIN — IPRATROPIUM BROMIDE AND ALBUTEROL SULFATE SCH ML: .5; 3 SOLUTION RESPIRATORY (INHALATION) at 19:29

## 2022-06-26 RX ADMIN — INSULIN ASPART SCH UNIT: 100 INJECTION, SOLUTION INTRAVENOUS; SUBCUTANEOUS at 06:48

## 2022-06-26 RX ADMIN — INSULIN ASPART SCH UNIT: 100 INJECTION, SOLUTION INTRAVENOUS; SUBCUTANEOUS at 17:05

## 2022-06-26 RX ADMIN — INSULIN DETEMIR SCH UNIT: 100 INJECTION, SOLUTION SUBCUTANEOUS at 13:01

## 2022-06-26 NOTE — P.CNPUL
History of Present Illness


Consult date: 06/26/22


Requesting physician: Georges Perez


Chief complaint: Shortness of breath, cough, congestion


History of present illness: 





This is 77-year-old male patient from a [facility] care facility who has a history

of diabetes mellitus, CVA/TIA, GERD, hyperlipidemia, hypertension, 

hypothyroidism.  He was brought into the emergency room yesterday by EMS after 

choking while eating a sloppy Yusuf and french fries.  He was quite hypoxic at the

[facility] and when paramedics arrived patient's O2 saturation was in the 60s.  He is 

seen today in the intensive care unit.  He is sitting up.  Awake and alert.  

Somewhat slow to respond.  Oriented 2.  He is currently on 40% Ventimask.  No 

IV fluids.  He was initiated on Zosyn.  Chest x-ray reveals some bilateral 

reticular nodular infiltrates right greater than left.  White count 16.1.  He

moglobin 12.0.  Platelets 242.  Sodium 125.  Potassium 4.9.  Bicarb 20.  BUN 18.

 Creatinine 0.7.  Lactic acid 2.0.  AST 84.  ALT 23.  Glucose 242. 





Review of Systems





REVIEW OF SYSTEMS:


CONSTITUTIONAL: Denies any recent significant weight loss or weight gain.


EYES: Denies change in vision.


EARS, NOSE, MOUTH, THROAT: Denies headaches, denies sore throat.


CARDIOVASCULAR: Denies chest pain, palpitations or syncopal episodes.


RESPIRATORY: Positive for shortness of breath, cough, congestion no hemoptysis.


GASTROINTESTINAL: Denies change in appetite, denies abdominal pain


GENITOURINARY: Denies hematuria, denies infections.


MUSKULOSKELETAL: Denies pain, denies swelling.


INTEGUMENTARY: Denies rash, denies eczema.


NEUROLOGICAL: Denies recent memory loss, no recent seizure activity. 


PSYCHIATRIC: Denies anxiety, denies depression.


HEMATOLOGIC/LYMPHATIC: Denies anemia, denies enlarged lymph nodes.








Past Medical History


Past Medical History: CVA/TIA, Diabetes Mellitus, GERD/Reflux, Hyperlipidemia, 

Hypertension, Thyroid Disorder


Additional Past Medical History / Comment(s): States had a stroke left arm and 

leg numbness


History of Any Multi-Drug Resistant Organisms: None Reported


Additional Past Surgical History / Comment(s): cataract surgery


Past Anesthesia/Blood Transfusion Reactions: No Reported Reaction


Smoking Status: Never smoker





- Past Family History


  ** Mother


Family Medical History: Diabetes Mellitus





Medications and Allergies


                                Home Medications











 Medication  Instructions  Recorded  Confirmed  Type


 


Thyroid,Pork [[place] Thyroid] 90 mg PO DAILY 06/27/17 06/25/22 History


 


Aspirin EC [Ecotrin Low Dose] 81 mg PO DAILY 06/04/21 06/25/22 History


 


Finasteride [Proscar] 5 mg PO DAILY 06/04/21 06/25/22 History


 


Nitroglycerin Sl Tabs [Nitrostat] 0.4 mg SL Q5M PRN 06/04/21 06/25/22 History


 


Tamsulosin HCl [Flomax] 0.4 mg PO DAILY 06/04/21 06/25/22 History


 


Acetaminophen Tab [Tylenol] 325 mg PO Q6HR PRN  tab 06/09/21 06/25/22 Rx


 


Atorvastatin [Lipitor] 40 mg PO HS@2000 01/18/22 06/25/22 History


 


Sodium Chloride Tab 1 gm PO HS 01/18/22 06/25/22 History


 


sitaGLIPtin [Januvia] 100 mg PO DAILY 01/18/22 06/25/22 History


 


DULoxetine HCL 40 mg PO DAILY 06/25/22 06/25/22 History


 


Docusate [Colace] 100 mg PO HS 06/25/22 06/25/22 History


 


Insulin Glargine [Lantus Vial] 17 unit SQ HS 06/25/22 06/25/22 History


 


Losartan Potassium [Cozaar] 25 mg PO HS 06/25/22 06/25/22 History


 


Metoprolol Succinate [Toprol XL] 50 mg PO DAILY 06/25/22 06/25/22 History


 


Multivitamins, Thera [Multivitamin 1 tab PO DAILY 06/25/22 06/25/22 History





(formulary)]    


 


Sennosides [Senokot] 8.6 mg PO DAILY 06/25/22 06/25/22 History


 


Trihexyphenidyl HCl 1 mg PO TID 06/25/22 06/25/22 History


 


glipiZIDE [Glucotrol] 5 mg PO DAILY 06/25/22 06/25/22 History


 


metFORMIN  mg PO BID 06/25/22 06/25/22 History


 


traMADol HCL 50 mg PO Q6H PRN 06/25/22 06/25/22 History








                                    Allergies











Allergy/AdvReac Type Severity Reaction Status Date / Time


 


No Known Allergies Allergy   Verified 06/25/22 17:12














Physical Exam


Vitals: 


                                   Vital Signs











  Temp Pulse Resp BP Pulse Ox FiO2


 


 06/26/22 10:00   79  23  106/62  97 


 


 06/26/22 09:44   100    


 


 06/26/22 09:00   72  20  126/73  97 


 


 06/26/22 08:00  98.3 F  81  25 H  111/73  97 


 


 06/26/22 07:00   89  16  120/73  97 


 


 06/26/22 06:00   79  22  115/67  97 


 


 06/26/22 05:00   80  24  114/60  95 


 


 06/26/22 04:00  98.9 F  89  28 H  115/67  97  40


 


 06/26/22 03:00   86  26 H  107/78  97 


 


 06/26/22 02:00   89  16  105/66  94 L 


 


 06/26/22 01:00   83  22  114/63  95 


 


 06/26/22 00:00  98.6 F  85  26 H  115/66  95  40


 


 06/25/22 23:00   87   108/62  94 L 


 


 06/25/22 22:00   90  13  123/65  94 L 


 


 06/25/22 21:08   93    94 L 


 


 06/25/22 21:00   95  27 H  106/72  93 L 


 


 06/25/22 20:00  97.7 F  95  22  123/71  94 L  40


 


 06/25/22 19:36   93    


 


 06/25/22 19:25   89    


 


 06/25/22 19:00   98  14  121/66  98 


 


 06/25/22 18:00   94  28 H  104/67  98 


 


 06/25/22 17:43   96  23  104/67  99 


 


 06/25/22 17:00  98.1 F  101 H  18  121/64  97 


 


 06/25/22 16:00   98  26 H  133/68  100 


 


 06/25/22 15:30   98    


 


 06/25/22 15:03   99    


 


 06/25/22 15:00   94  29 H  124/60  91 L 


 


 06/25/22 14:38    30 H   


 


 06/25/22 14:32  98.1 F  100  22  124/60  97 


 


 06/25/22 14:29      78 L 








                                Intake and Output











 06/25/22 06/26/22 06/26/22





 22:59 06:59 14:59


 


Intake Total 1000 100 250


 


Output Total 600 800 230


 


Balance 400 -700 20


 


Intake:   


 


  IV 1000 100 


 


    Piperacillin-Tazobactam 3  100 





    .375 gm In Sodium   





    Chloride 0.9% 100 ml @   





    200 mls/hr IVPB ONCE STA   





    Rx#:[id number]   


 


    Sodium Chloride 0.9% 1, 1000  





    000 ml @ 999 mls/hr IV .   





    Q1H1M ONE Rx#:[id number]   


 


  Intake, IV Titration   250





  Amount   


 


    Piperacillin-Tazobactam 3   100





    .375 gm In Sodium   





    Chloride 0.9% 100 ml @ 25   





    mls/hr IVPB Q8HR [facility] Rx#   





    :[id number]   


 


    Sodium Chloride 0.9% 1,   150





    000 ml @ 75 mls/hr IV .   





    [id number] [facility] Rx#:[id number]   


 


Output:   


 


  Urine 600 800 230


 


Other:   


 


  Voiding Method Indwelling Catheter Indwelling Catheter Indwelling Catheter


 


  Weight 99.79 kg  














GENERAL EXAM: Alert, oriented 2, 77-year-old male patient, and 40% Ventimask, 

comfortable in no apparent distress.


HEAD: Normocephalic.


EYES: Normal reaction of pupils, equal size.


NOSE: Clear with pink turbinates.


THROAT: No erythema or exudates.


NECK: No masses, no JVD.


CHEST: No chest wall deformity.


LUNGS: Equal air entry with bilateral scattered rhonchi.


CVS: S1 and S2 normal with no audible murmur, regular rhythm.


ABDOMEN: No hepatosplenomegaly, normal bowel sounds, no guarding or rigidity.


SPINE: No scoliosis or deformity


SKIN: No rashes


CENTRAL NERVOUS SYSTEM: No focal deficits, tone is normal in all 4 extremities.


EXTREMITIES: There is no peripheral edema.  No clubbing, no cyanosis.  

Peripheral pulses are intact.





Results





- Laboratory Findings


CBC and BMP: 


                                 06/26/22 02:58





                                 06/26/22 02:58


PT/INR, D-dimer











PT  10.4 sec (9.0-12.0)   06/25/22  14:44    


 


INR  1.0  (<1.2)   06/25/22  14:44    








Abnormal lab findings: 


                                  Abnormal Labs











  06/25/22 06/25/22 06/25/22





  14:44 14:44 14:44


 


WBC  16.1 H  


 


RBC  4.09 L  


 


Hgb  12.5 L  


 


Hct  37.3 L  


 


Neutrophils #  13.5 H  


 


Sodium   123 L 


 


Chloride   91 L 


 


Carbon Dioxide   


 


Glucose   223 H 


 


POC Glucose (mg/dL)   


 


Hemoglobin A1c   


 


Osmolality   


 


Plasma Lactic Acid Kirk    3.2 H*


 


Calcium   


 


AST   


 


Total Protein   6.1 L 


 


Albumin   














  06/25/22 06/25/22 06/25/22





  16:08 16:48 18:34


 


WBC   


 


RBC   


 


Hgb   


 


Hct   


 


Neutrophils #   


 


Sodium   


 


Chloride   


 


Carbon Dioxide   


 


Glucose   


 


POC Glucose (mg/dL)   224 H 


 


Hemoglobin A1c  7.1 H  


 


Osmolality   


 


Plasma Lactic Acid Kirk    3.3 H*


 


Calcium   


 


AST   


 


Total Protein   


 


Albumin   














  06/25/22 06/25/22 06/25/22





  18:37 20:18 21:27


 


WBC   


 


RBC   


 


Hgb   


 


Hct   


 


Neutrophils #   


 


Sodium   


 


Chloride   


 


Carbon Dioxide   


 


Glucose   


 


POC Glucose (mg/dL)   265 H 


 


Hemoglobin A1c   


 


Osmolality  275 L  


 


Plasma Lactic Acid Kirk    4.2 H*


 


Calcium   


 


AST   


 


Total Protein   


 


Albumin   














  06/26/22 06/26/22 06/26/22





  00:08 02:58 02:58


 


WBC   16.1 H 


 


RBC   3.88 L 


 


Hgb   12.0 L 


 


Hct   36.0 L 


 


Neutrophils #   13.7 H 


 


Sodium    125 L


 


Chloride   


 


Carbon Dioxide    20 L


 


Glucose    242 H


 


POC Glucose (mg/dL)   


 


Hemoglobin A1c   


 


Osmolality   


 


Plasma Lactic Acid Kirk  3.3 H*  


 


Calcium    8.2 L


 


AST    84 H


 


Total Protein    5.6 L


 


Albumin    3.1 L














  06/26/22 06/26/22





  02:58 06:36


 


WBC  


 


RBC  


 


Hgb  


 


Hct  


 


Neutrophils #  


 


Sodium  


 


Chloride  


 


Carbon Dioxide  


 


Glucose  


 


POC Glucose (mg/dL)   249 H


 


Hemoglobin A1c  


 


Osmolality  


 


Plasma Lactic Acid Kirk  2.4 H* 


 


Calcium  


 


AST  


 


Total Protein  


 


Albumin  














- Diagnostic Findings


Chest x-ray: image reviewed





Assessment and Plan


Assessment: 





Acute hypoxemic respiratory failure secondary to aspiration of a sloppy Yusuf and 

french fries, initiated on Zosyn





Hyponatremia





History of CVA/TIA





Hyperlipidemia





Hypertension





Hypothyroidism





Benign prostatic hyperplasia





Diabetes mellitus





Nursing home resident





Plan:





The patient was seen and evaluated


Chest x-ray, labs and medications reviewed


Add DuoNeb inhalations


Continue Zosyn 


Obtain a bedside swallow evaluation


Convert Ventimask over to nasal cannula


Lovenox for DVT prophylax


Pepcid for GI prophylaxis


Add normal saline at 75 ML's per hour


Could be transferred out of the ICU later today if stable


We will continue to follow and make further recommendations based on his 

clinical status





I have personally seen and examined the patient, performed the documentation and

the assessment and plan as written.  Number of minutes spent on the visit: 20.

## 2022-06-26 NOTE — XR
EXAMINATION TYPE: XR chest 1V portable

 

DATE OF EXAM: 6/26/2022

 

COMPARISON: 6/25/2022

 

HISTORY: Shortness of breath

 

TECHNIQUE:  Frontal and lateral views of the chest are obtained.

 

FINDINGS:

 

Scattered senescent parenchymal changes noted. Hyperinflation compatible with COPD. 

 

Bilateral reticulonodular infiltrates persist right greater than left without significant change.

 

Heart size is stable.

 

Mediastinal structures are stable and grossly unremarkable.

 

No evidence for hilar prominence.

 

Degenerative changes dorsal spine. 

 

IMPRESSION:

1. Bilateral reticulonodular infiltrates persist right greater than left without significant change.

## 2022-06-26 NOTE — P.PN
Subjective


Progress Note Date: 06/26/22


Principal diagnosis: 





aspiration





He is feeling better today. States he is a little sob. No chest pain. Still with

minor cough. He passed the bedside swallow evaluation, liquid diet started. 





Objective





- Vital Signs


Vital signs: 


                                   Vital Signs











Temp  98.3 F   06/26/22 08:00


 


Pulse  73   06/26/22 12:00


 


Resp  22   06/26/22 12:00


 


BP  102/72   06/26/22 12:00


 


Pulse Ox  96   06/26/22 12:00


 


FiO2  40   06/26/22 04:00








                                 Intake & Output











 06/25/22 06/26/22 06/26/22





 18:59 06:59 18:59


 


Intake Total  1100 425


 


Output Total 200 1200 275


 


Balance -200 -100 150


 


Weight 99.79 kg  


 


Intake:   


 


  IV  1100 


 


    Piperacillin-Tazobactam 3  100 





    .375 gm In Sodium   





    Chloride 0.9% 100 ml @   





    200 mls/hr IVPB ONCE STA   





    Rx#:[id number]   


 


    Sodium Chloride 0.9% 1,  1000 





    000 ml @ 999 mls/hr IV .   





    Q1H1M ONE Rx#:[id number]   


 


  Intake, IV Titration   325





  Amount   


 


    Piperacillin-Tazobactam 3   100





    .375 gm In Sodium   





    Chloride 0.9% 100 ml @ 25   





    mls/hr IVPB [facility]   





    :[id number]   


 


    Sodium Chloride 0.9% 1,   225





    000 ml @ 75 mls/hr IV .   





    [id number] [facility] Rx#:[id number]   


 


  Oral   100


 


Output:   


 


  Urine 200 1200 275


 


Other:   


 


  Voiding Method Indwelling Catheter Indwelling Catheter Indwelling Catheter














- Exam





Constitutional: No acute distress, conversant, pleasant


Eyes:Anicteric sclerae, moist conjunctiva, no lid-lag, PERRLA, 


ENMT: Oropharynx clear, no erythema, exudates


Neck: Supple, FROM, no masses, or JVD, No carotid bruits, No thyromegaly


Lungs: Clear to auscultation, Clear to percussion, Normal respiratory effort, no

accessory muscle use 


Cardiovascular: Heart regular in rate and rhythm, No murmurs, gallops, or rubs, 

No peripheral edema


Abdominal: Soft, Nontender, no guarding, rebound or rigidity, Normoactive bowel 

sounds, No hepatomegaly, No splenomegaly, No palpable mass 


Skin: Normal temperature, tone, texture, turgor, no induration, No subcutaneous 

nodules, No rash, lesions, No ulcers


Extremities: No digital cyanosis, No clubbing, Pedal pulses intact and s

ymmetrical, Radial pulses intact and symmetrical, No calf tenderness 


Psychiatric: Alert and oriented to person, place and time, appropriate affect, 

intact judgement         


Neuro: Muscles Strength 5/5 in all 4 extremities, Sensation to light touch 

grossly present throughout, Cranial nerves II-XII grossly intact, no focal 

sensory deficits








- Labs


CBC & Chem 7: 


                                 06/26/22 02:58





                                 06/26/22 02:58


Labs: 


                  Abnormal Lab Results - Last 24 Hours (Table)











  06/25/22 06/25/22 06/25/22 Range/Units





  14:44 14:44 14:44 


 


WBC  16.1 H    (3.8-10.6)  k/uL


 


RBC  4.09 L    (4.30-5.90)  m/uL


 


Hgb  12.5 L    (13.0-17.5)  gm/dL


 


Hct  37.3 L    (39.0-53.0)  %


 


Neutrophils #  13.5 H    (1.3-7.7)  k/uL


 


Sodium   123 L   (137-145)  mmol/L


 


Chloride   91 L   ()  mmol/L


 


Carbon Dioxide     (22-30)  mmol/L


 


Glucose   223 H   (74-99)  mg/dL


 


POC Glucose (mg/dL)     ()  mg/dL


 


Hemoglobin A1c     (0.0-6.0)  %


 


Osmolality     (280-301)  mosm/kg


 


Plasma Lactic Acid Kirk    3.2 H*  (0.7-2.0)  mmol/L


 


Calcium     (8.4-10.2)  mg/dL


 


AST     (17-59)  U/L


 


Total Protein   6.1 L   (6.3-8.2)  g/dL


 


Albumin     (3.5-5.0)  g/dL














  06/25/22 06/25/22 06/25/22 Range/Units





  16:08 16:48 18:34 


 


WBC     (3.8-10.6)  k/uL


 


RBC     (4.30-5.90)  m/uL


 


Hgb     (13.0-17.5)  gm/dL


 


Hct     (39.0-53.0)  %


 


Neutrophils #     (1.3-7.7)  k/uL


 


Sodium     (137-145)  mmol/L


 


Chloride     ()  mmol/L


 


Carbon Dioxide     (22-30)  mmol/L


 


Glucose     (74-99)  mg/dL


 


POC Glucose (mg/dL)   224 H   ()  mg/dL


 


Hemoglobin A1c  7.1 H    (0.0-6.0)  %


 


Osmolality     (280-301)  mosm/kg


 


Plasma Lactic Acid Kirk    3.3 H*  (0.7-2.0)  mmol/L


 


Calcium     (8.4-10.2)  mg/dL


 


AST     (17-59)  U/L


 


Total Protein     (6.3-8.2)  g/dL


 


Albumin     (3.5-5.0)  g/dL














  06/25/22 06/25/22 06/25/22 Range/Units





  18:37 20:18 21:27 


 


WBC     (3.8-10.6)  k/uL


 


RBC     (4.30-5.90)  m/uL


 


Hgb     (13.0-17.5)  gm/dL


 


Hct     (39.0-53.0)  %


 


Neutrophils #     (1.3-7.7)  k/uL


 


Sodium     (137-145)  mmol/L


 


Chloride     ()  mmol/L


 


Carbon Dioxide     (22-30)  mmol/L


 


Glucose     (74-99)  mg/dL


 


POC Glucose (mg/dL)   265 H   ()  mg/dL


 


Hemoglobin A1c     (0.0-6.0)  %


 


Osmolality  275 L    (280-301)  mosm/kg


 


Plasma Lactic Acid Kirk    4.2 H*  (0.7-2.0)  mmol/L


 


Calcium     (8.4-10.2)  mg/dL


 


AST     (17-59)  U/L


 


Total Protein     (6.3-8.2)  g/dL


 


Albumin     (3.5-5.0)  g/dL














  06/26/22 06/26/22 06/26/22 Range/Units





  00:08 02:58 02:58 


 


WBC   16.1 H   (3.8-10.6)  k/uL


 


RBC   3.88 L   (4.30-5.90)  m/uL


 


Hgb   12.0 L   (13.0-17.5)  gm/dL


 


Hct   36.0 L   (39.0-53.0)  %


 


Neutrophils #   13.7 H   (1.3-7.7)  k/uL


 


Sodium    125 L  (137-145)  mmol/L


 


Chloride     ()  mmol/L


 


Carbon Dioxide    20 L  (22-30)  mmol/L


 


Glucose    242 H  (74-99)  mg/dL


 


POC Glucose (mg/dL)     ()  mg/dL


 


Hemoglobin A1c     (0.0-6.0)  %


 


Osmolality     (280-301)  mosm/kg


 


Plasma Lactic Acid Kirk  3.3 H*    (0.7-2.0)  mmol/L


 


Calcium    8.2 L  (8.4-10.2)  mg/dL


 


AST    84 H  (17-59)  U/L


 


Total Protein    5.6 L  (6.3-8.2)  g/dL


 


Albumin    3.1 L  (3.5-5.0)  g/dL














  06/26/22 06/26/22 06/26/22 Range/Units





  02:58 06:36 12:33 


 


WBC     (3.8-10.6)  k/uL


 


RBC     (4.30-5.90)  m/uL


 


Hgb     (13.0-17.5)  gm/dL


 


Hct     (39.0-53.0)  %


 


Neutrophils #     (1.3-7.7)  k/uL


 


Sodium     (137-145)  mmol/L


 


Chloride     ()  mmol/L


 


Carbon Dioxide     (22-30)  mmol/L


 


Glucose     (74-99)  mg/dL


 


POC Glucose (mg/dL)   249 H  301 H  ()  mg/dL


 


Hemoglobin A1c     (0.0-6.0)  %


 


Osmolality     (280-301)  mosm/kg


 


Plasma Lactic Acid Kirk  2.4 H*    (0.7-2.0)  mmol/L


 


Calcium     (8.4-10.2)  mg/dL


 


AST     (17-59)  U/L


 


Total Protein     (6.3-8.2)  g/dL


 


Albumin     (3.5-5.0)  g/dL














Assessment and Plan


Plan: 





Acute hypoxic respiratory failure


Aspiration pneumonia


Likely sec to the aspiration event


NRB weaned down to 5L


Zosyn


Pulm following





Dysphagia/aspiration


He passed the bedside swallow evaluation, liquid diet started. 


Will order a formal swallow evaluation





DM 2


Check A1c


SSI


He is hyperglycemic, started on liquid diet, restart lantus 


 


Hypertension


BP borderline, hold bp meds





GERD/Reflux, 


Hyperlipidemia,


Hypothyroidism


Resume oral meds





DVT prophylaxis:


Lovenox 





Anticipated dispo: back to NH in 2-3 days

## 2022-06-27 LAB
ALBUMIN SERPL-MCNC: 3.6 G/DL (ref 3.5–5)
ALBUMIN/GLOB SERPL: 1.3 {RATIO}
ALP SERPL-CCNC: 57 U/L (ref 38–126)
ALT SERPL-CCNC: 28 U/L (ref 4–49)
ANION GAP SERPL CALC-SCNC: 12 MMOL/L
APTT BLD: 21.3 SEC (ref 22–30)
AST SERPL-CCNC: 60 U/L (ref 17–59)
BASOPHILS # BLD AUTO: 0 K/UL (ref 0–0.2)
BASOPHILS # BLD AUTO: 0.1 K/UL (ref 0–0.2)
BASOPHILS NFR BLD AUTO: 0 %
BASOPHILS NFR BLD AUTO: 0 %
BUN SERPL-SCNC: 22 MG/DL (ref 9–20)
CALCIUM SPEC-MCNC: 8.5 MG/DL (ref 8.4–10.2)
CHLORIDE SERPL-SCNC: 99 MMOL/L (ref 98–107)
CHOLEST SERPL-MCNC: 152 MG/DL (ref 0–200)
CO2 SERPL-SCNC: 18 MMOL/L (ref 22–30)
EOSINOPHIL # BLD AUTO: 0.1 K/UL (ref 0–0.7)
EOSINOPHIL # BLD AUTO: 0.1 K/UL (ref 0–0.7)
EOSINOPHIL NFR BLD AUTO: 1 %
EOSINOPHIL NFR BLD AUTO: 1 %
ERYTHROCYTE [DISTWIDTH] IN BLOOD BY AUTOMATED COUNT: 3.86 M/UL (ref 4.3–5.9)
ERYTHROCYTE [DISTWIDTH] IN BLOOD BY AUTOMATED COUNT: 3.89 M/UL (ref 4.3–5.9)
ERYTHROCYTE [DISTWIDTH] IN BLOOD: 14 % (ref 11.5–15.5)
ERYTHROCYTE [DISTWIDTH] IN BLOOD: 14 % (ref 11.5–15.5)
GLOBULIN SER CALC-MCNC: 2.7 G/DL
GLUCOSE BLD-MCNC: 126 MG/DL (ref 70–110)
GLUCOSE BLD-MCNC: 209 MG/DL (ref 70–110)
GLUCOSE BLD-MCNC: 270 MG/DL (ref 70–110)
GLUCOSE BLD-MCNC: 321 MG/DL (ref 70–110)
GLUCOSE BLD-MCNC: 334 MG/DL (ref 70–110)
GLUCOSE SERPL-MCNC: 260 MG/DL (ref 74–99)
HCT VFR BLD AUTO: 36.4 % (ref 39–53)
HCT VFR BLD AUTO: 36.7 % (ref 39–53)
HDLC SERPL-MCNC: 60.4 MG/DL (ref 40–60)
HGB BLD-MCNC: 12.1 GM/DL (ref 13–17.5)
HGB BLD-MCNC: 12.4 GM/DL (ref 13–17.5)
INR PPP: 1 (ref ?–1.2)
LDLC SERPL CALC-MCNC: 75.9 MG/DL (ref 0–131)
LYMPHOCYTES # SPEC AUTO: 0.6 K/UL (ref 1–4.8)
LYMPHOCYTES # SPEC AUTO: 3.3 K/UL (ref 1–4.8)
LYMPHOCYTES NFR SPEC AUTO: 22 %
LYMPHOCYTES NFR SPEC AUTO: 4 %
MCH RBC QN AUTO: 31.3 PG (ref 25–35)
MCH RBC QN AUTO: 31.8 PG (ref 25–35)
MCHC RBC AUTO-ENTMCNC: 33.3 G/DL (ref 31–37)
MCHC RBC AUTO-ENTMCNC: 33.7 G/DL (ref 31–37)
MCV RBC AUTO: 94.1 FL (ref 80–100)
MCV RBC AUTO: 94.3 FL (ref 80–100)
MONOCYTES # BLD AUTO: 0.6 K/UL (ref 0–1)
MONOCYTES # BLD AUTO: 1 K/UL (ref 0–1)
MONOCYTES NFR BLD AUTO: 4 %
MONOCYTES NFR BLD AUTO: 6 %
NEUTROPHILS # BLD AUTO: 10.4 K/UL (ref 1.3–7.7)
NEUTROPHILS # BLD AUTO: 14.3 K/UL (ref 1.3–7.7)
NEUTROPHILS NFR BLD AUTO: 70 %
NEUTROPHILS NFR BLD AUTO: 92 %
PLATELET # BLD AUTO: 323 K/UL (ref 150–450)
PLATELET # BLD AUTO: 333 K/UL (ref 150–450)
POTASSIUM SERPL-SCNC: 4.6 MMOL/L (ref 3.5–5.1)
PROT SERPL-MCNC: 6.3 G/DL (ref 6.3–8.2)
PT BLD: 10.5 SEC (ref 9–12)
SODIUM SERPL-SCNC: 129 MMOL/L (ref 137–145)
TRIGL SERPL-MCNC: 78.6 MG/DL (ref 0–149)
VLDLC SERPL CALC-MCNC: 15.72 MG/DL (ref 5–40)
WBC # BLD AUTO: 15 K/UL (ref 3.8–10.6)
WBC # BLD AUTO: 15.6 K/UL (ref 3.8–10.6)

## 2022-06-27 RX ADMIN — PIPERACILLIN AND TAZOBACTAM SCH MLS/HR: 3; .375 INJECTION, POWDER, FOR SOLUTION INTRAVENOUS at 00:43

## 2022-06-27 RX ADMIN — TAMSULOSIN HYDROCHLORIDE SCH MG: 0.4 CAPSULE ORAL at 09:02

## 2022-06-27 RX ADMIN — PIPERACILLIN AND TAZOBACTAM SCH MLS/HR: 3; .375 INJECTION, POWDER, FOR SOLUTION INTRAVENOUS at 09:02

## 2022-06-27 RX ADMIN — INSULIN ASPART SCH: 100 INJECTION, SOLUTION INTRAVENOUS; SUBCUTANEOUS at 08:17

## 2022-06-27 RX ADMIN — PIPERACILLIN AND TAZOBACTAM SCH MLS/HR: 3; .375 INJECTION, POWDER, FOR SOLUTION INTRAVENOUS at 14:39

## 2022-06-27 RX ADMIN — METHYLPREDNISOLONE SODIUM SUCCINATE SCH MG: 125 INJECTION, POWDER, FOR SOLUTION INTRAMUSCULAR; INTRAVENOUS at 11:18

## 2022-06-27 RX ADMIN — FUROSEMIDE SCH MG: 10 INJECTION, SOLUTION INTRAMUSCULAR; INTRAVENOUS at 21:17

## 2022-06-27 RX ADMIN — CEFAZOLIN SCH: 330 INJECTION, POWDER, FOR SOLUTION INTRAMUSCULAR; INTRAVENOUS at 13:05

## 2022-06-27 RX ADMIN — FAMOTIDINE SCH MG: 10 INJECTION, SOLUTION INTRAVENOUS at 10:50

## 2022-06-27 RX ADMIN — METHYLPREDNISOLONE SODIUM SUCCINATE SCH MG: 125 INJECTION, POWDER, FOR SOLUTION INTRAMUSCULAR; INTRAVENOUS at 14:39

## 2022-06-27 RX ADMIN — IPRATROPIUM BROMIDE AND ALBUTEROL SULFATE SCH: .5; 3 SOLUTION RESPIRATORY (INHALATION) at 12:28

## 2022-06-27 RX ADMIN — INSULIN ASPART SCH UNIT: 100 INJECTION, SOLUTION INTRAVENOUS; SUBCUTANEOUS at 12:41

## 2022-06-27 RX ADMIN — INSULIN ASPART SCH UNIT: 100 INJECTION, SOLUTION INTRAVENOUS; SUBCUTANEOUS at 21:17

## 2022-06-27 RX ADMIN — HEPARIN SODIUM SCH MLS/HR: 10000 INJECTION, SOLUTION INTRAVENOUS at 14:07

## 2022-06-27 RX ADMIN — ATORVASTATIN CALCIUM SCH MG: 40 TABLET, FILM COATED ORAL at 14:39

## 2022-06-27 RX ADMIN — THYROID, PORCINE SCH MG: 30 TABLET ORAL at 09:02

## 2022-06-27 RX ADMIN — INSULIN ASPART SCH UNIT: 100 INJECTION, SOLUTION INTRAVENOUS; SUBCUTANEOUS at 16:45

## 2022-06-27 RX ADMIN — ASPIRIN 81 MG CHEWABLE TABLET SCH MG: 81 TABLET CHEWABLE at 14:39

## 2022-06-27 RX ADMIN — ENOXAPARIN SODIUM SCH MG: 40 INJECTION SUBCUTANEOUS at 09:02

## 2022-06-27 RX ADMIN — IPRATROPIUM BROMIDE AND ALBUTEROL SULFATE SCH ML: .5; 3 SOLUTION RESPIRATORY (INHALATION) at 16:39

## 2022-06-27 RX ADMIN — IPRATROPIUM BROMIDE AND ALBUTEROL SULFATE SCH ML: .5; 3 SOLUTION RESPIRATORY (INHALATION) at 19:38

## 2022-06-27 RX ADMIN — INSULIN DETEMIR SCH UNIT: 100 INJECTION, SOLUTION SUBCUTANEOUS at 22:01

## 2022-06-27 RX ADMIN — IPRATROPIUM BROMIDE AND ALBUTEROL SULFATE SCH ML: .5; 3 SOLUTION RESPIRATORY (INHALATION) at 07:55

## 2022-06-27 RX ADMIN — METOPROLOL TARTRATE SCH MG: 25 TABLET, FILM COATED ORAL at 21:17

## 2022-06-27 NOTE — P.PN
Subjective


Progress Note Date: 06/27/22








This is 77-year-old male patient from a [facility] care facility who has a history

of diabetes mellitus, CVA/TIA, GERD, hyperlipidemia, hypertension, 

hypothyroidism.  He was brought into the emergency room yesterday by EMS after 

choking while eating a sloppy Yusuf and french fries.  He was quite hypoxic at the

[facility] and when paramedics arrived patient's O2 saturation was in the 60s.  He is 

seen today in the intensive care unit.  He is sitting up.  Awake and alert.  

Somewhat slow to respond.  Oriented 2.  He is currently on 40% Ventimask.  No 

IV fluids.  He was initiated on Zosyn.  Chest x-ray reveals some bilateral 

reticular nodular infiltrates right greater than left.  White count 16.1.  

Hemoglobin 12.0.  Platelets 242.  Sodium 125.  Potassium 4.9.  Bicarb 20.  BUN 

18.  Creatinine 0.7.  Lactic acid 2.0.  AST 84.  ALT 23.  Glucose 242.








06/27/2022, I'm seeing this patient on a medical floor.  I was asked by the 

nursing staff to evaluate him as soon as possible as the patient developed acute

shortness of breath.  At the time of my arrival, the patient was in significant 

respiratory distress.  He was tachycardic, tachypneic, he had a congested cough,

diaphoretic, and was having labored breathing.  No further bouts of aspiration 

per the nursing staff.  The patient was admitted yesterday to the hospital 

because of an aspiration pneumonia started on IV Zosyn.  At that point, a workup

was initiated.  Chest x-ray was ordered that showed lower lobe pulmonary 

infiltrates and pulmonary vessel congestion.  The patient was given a dose of 

Lasix 40 mg IV.  The patient was given IV Solu-Medrol.  The patient was started 

on bronchodilators.  Following that, I started the patient on BiPAP at a pre

ssure of 10/5 cm of water with an FiO2 of 100% and the patient a chance to the 

intensive care unit with understanding that he has a DNR/DNI CODE STATUS.  The 

patient was later on evaluated in the ICU and the patient was already feeling 

better and diuresing.  EKG showed sinus tachycardia.  Troponin came back at 6 

and the patient suffered an acute non-ST segment elevation myocardial 

infarctions the patient will be seen by cardiology.  The white cell count of 

15.0 with hemoglobin 12.1.  The patient's sodium is up to 129, BUN is at 22 with

a creatinine of 0.9 and the potassium level of 4.6.  The proBNP level was 6690.





Objective





- Vital Signs


Vital signs: 


                                   Vital Signs











Temp  99.0 F   06/27/22 08:00


 


Pulse  86   06/27/22 08:05


 


Resp  22   06/27/22 08:00


 


BP  128/69   06/27/22 08:00


 


Pulse Ox  100   06/27/22 08:00


 


FiO2  40   06/26/22 04:00








                                 Intake & Output











 06/26/22 06/27/22 06/27/22





 18:59 06:59 18:59


 


Intake Total 1610  


 


Output Total 650 400 


 


Balance 960 -400 


 


Intake:   


 


  Intake, IV Titration 875  





  Amount   


 


    Piperacillin-Tazobactam 3 200  





    .375 gm In Sodium   





    Chloride 0.9% 100 ml @ 25   





    mls/hr IVPB Q8HR [facility] Rx#   





    :[id number]   


 


    Sodium Chloride 0.9% 1, 675  





    000 ml @ 75 mls/hr IV .   





    [id number] [facility] Rx#:[id number]   


 


  Oral 700  


 


  Tube Feeding 35  


 


Output:   


 


  Urine 650 400 


 


Other:   


 


  Voiding Method Indwelling Catheter Urinal 














- Exam








GENERAL EXAM: Alert, oriented 2, 77-year-old male patient, and BiPAP at a 

pressure of 10/5 with an FiO2 of 100% and the patient is still having labored 

breathing although less short of breath and more comfortable after being 

transferred to the intensive care unit.


HEAD: Normocephalic.


EYES: Normal reaction of pupils, equal size.


NOSE: Clear with pink turbinates.


THROAT: No erythema or exudates.


NECK: No masses, no JVD.


CHEST: No chest wall deformity.


LUNGS: Equal air entry with bilateral scattered rhonchi.


CVS: S1 and S2 normal with no audible murmur, regular rhythm.


ABDOMEN: No hepatosplenomegaly, normal bowel sounds, no guarding or rigidity.


SPINE: No scoliosis or deformity


SKIN: No rashes


CENTRAL NERVOUS SYSTEM: No focal deficits, tone is normal in all 4 extremities.


EXTREMITIES: There is no peripheral edema.  No clubbing, no cyanosis.  

Peripheral pulses are intact.








- Labs


CBC & Chem 7: 


                                 06/27/22 11:49





                                 06/27/22 11:49


Labs: 


                  Abnormal Lab Results - Last 24 Hours (Table)











  06/26/22 06/26/22 06/26/22 Range/Units





  12:33 16:21 20:22 


 


POC Glucose (mg/dL)  301 H  229 H  296 H  ()  mg/dL














  06/27/22 Range/Units





  06:59 


 


POC Glucose (mg/dL)  126 H  ()  mg/dL








                      Microbiology - Last 24 Hours (Table)











 06/25/22 16:01 Blood Culture - Preliminary





 Blood    No Growth after 24 hours


 


 06/25/22 16:01 Blood Culture - Preliminary





 Blood    No Growth after 24 hours














Assessment and Plan


Plan: 








Acute hypoxemic respiratory failure secondary to aspiration of a sloppy Yusuf and 

french fries, initiated on Zosyn.  The patient's condition further decompensated

probably related to an acute pulmonary edema in combination with lower lobe 

pneumonia.  Based on that, the patient got transferred to the intensive care 

unit.  DNR/DNI CODE STATUS.  Currently on IV Zosyn.  Will be started on 

diuretics and BiPAP therapy.





Acute non-ST segment elevation myocardial infarction and initial troponin was at

6





Hyponatremia, improving sodium level is up 129





History of CVA/TIA





Hyperlipidemia





Hypertension





Hypothyroidism





Benign prostatic hyperplasia





Diabetes mellitus





Nursing home resident





Plan





DNR/DNI CODE STATUS


Continue BiPAP therapy for respiratory support


Continue Lasix 40 mg IV every 12 hours


Kennedy catheter in place and monitor the urine output


Continue IV Solu-Medrol


Continue IV Zosyn


Consult cardiology


Follow-up chest x-ray in a.m.


Monitor sodium level and the patient is currently at KVO


Condition is obviously critical baseline above-mentioned comorbidities.  We'll 

continue to follow.  Previous echocardiogram from 01/07/2019 showed a normal LV 

function.  It'll be worthwhile to repeat the echocardiogram.  Condition is 

critical at this point in time.  We'll continue to follow.

## 2022-06-27 NOTE — P.PN
Subjective


Progress Note Date: 06/27/22


Principal diagnosis: 





aspiration





Patient was doing well morning up until I saw him when he started having 

shortness of breath and coughing.  Over 15 minutes his face turned bluish.  

Heart rate went up to 150-160.  EKG showed SVT.  Blood pressure was not 

detectable.  He did complain from pleuritic chest pain.  According to the nurses

only has been getting IV Zosyn.





Objective





- Vital Signs


Vital signs: 


                                   Vital Signs











Temp  99.0 F   06/27/22 08:00


 


Pulse  86   06/27/22 08:05


 


Resp  45 H  06/27/22 11:24


 


BP  160/80   06/27/22 11:24


 


Pulse Ox  100   06/27/22 08:00


 


FiO2  45   06/27/22 11:46








                                 Intake & Output











 06/26/22 06/27/22 06/27/22





 18:59 06:59 18:59


 


Intake Total 1610  


 


Output Total 650 400 


 


Balance 960 -400 


 


Intake:   


 


  Intake, IV Titration 875  





  Amount   


 


    Piperacillin-Tazobactam 3 200  





    .375 gm In Sodium   





    Chloride 0.9% 100 ml @ 25   





    mls/hr IVPB Q8HR [facility] Rx#   





    :[id number]   


 


    Sodium Chloride 0.9% 1, 675  





    000 ml @ 75 mls/hr IV .   





    [id number] [facility] Rx#:[id number]   


 


  Oral 700  


 


  Tube Feeding 35  


 


Output:   


 


  Urine 650 400 


 


Other:   


 


  Voiding Method Indwelling Catheter Urinal 














- Exam





Constitutional:  Severe respiratory distress, unable to finish sentences.


Eyes:Anicteric sclerae, moist conjunctiva, no lid-lag, PERRLA, 


ENMT: Oropharynx clear, no erythema, exudates


Neck: Supple, FROM, no masses, or JVD, No carotid bruits, No thyromegaly


Lungs: Bilateral rhonchi and wheezing. Clear to percussion, Normal respiratory 

effort, no accessory muscle use 


Cardiovascular: Tachycardia, No murmurs, gallops, or rubs, No peripheral edema


Abdominal: Soft, Nontender, no guarding, rebound or rigidity, Normoactive bowel 

sounds, No hepatomegaly, No splenomegaly, No palpable mass 


Skin: Normal temperature, tone, texture, turgor, no induration, No subcutaneous 

nodules, No rash, lesions, No ulcers


Extremities: No digital cyanosis, No clubbing, Pedal pulses intact and 

symmetrical, Radial pulses intact and symmetrical, No calf tenderness          


Neuro: General weakness





- Labs


CBC & Chem 7: 


                                 06/27/22 11:49





                                 06/26/22 02:58


Labs: 


                  Abnormal Lab Results - Last 24 Hours (Table)











  06/26/22 06/26/22 06/26/22 Range/Units





  12:33 16:21 20:22 


 


POC Glucose (mg/dL)  301 H  229 H  296 H  ()  mg/dL














  06/27/22 06/27/22 Range/Units





  06:59 11:12 


 


POC Glucose (mg/dL)  126 H  209 H  ()  mg/dL








                      Microbiology - Last 24 Hours (Table)











 06/25/22 16:01 Blood Culture - Preliminary





 Blood    No Growth after 24 hours


 


 06/25/22 16:01 Blood Culture - Preliminary





 Blood    No Growth after 24 hours














Assessment and Plan


Plan: 





Acute hypoxic respiratory failure


Aspiration pneumonia


Flash pulm edema


He was initialy on NRB in the ICU, was weaned down to 5L and transferred to the 

floor then 6/26 he went into flash pulmonary edema with severe respiratory 

distress, was placed on bipap.


Continue zosyn


IV lasix


IV morphine


Duonebs and steroids added by pulm


Check stat trops BNP and lytes





Sinus tach, was in SVT earlier


Monitor on tele.





Dysphagia/aspiration


He passed the bedside swallow evaluation initially in the ICU, liquid diet 

started. 


Will order a formal swallow evaluation to be done once stable





DM 2


A1c 7.1


SSI


Hold levemir that was started earlier in the admission when he was on liquid 

diet, 


Continue to monitor blood sugars.


 


Hypertension


BP borderline, hold bp meds





GERD/Reflux, 


Hyperlipidemia,


Hypothyroidism


Resume oral meds





DVT prophylaxis:


Lovenox 





Anticipated dispo: back to NH in 2-3 days

## 2022-06-27 NOTE — XR
EXAMINATION TYPE: XR chest 1V

 

DATE OF EXAM: 6/27/2022

 

COMPARISON: 6/26/2022

 

HISTORY: Shortness of breath

 

TECHNIQUE: Single frontal view of the chest is obtained.

 

FINDINGS:  Subsegmental bilateral consolidation with small effusion and diffuse interstitial pattern 
heart size stable. Atherosclerotic change aorta. No pneumothorax. Diffuse osteopenia with arthropathy
 of the shoulders. Gastric bubble now appears to be distended.

 

IMPRESSION:  

1. Diffuse bilateral interstitial pattern with basilar infiltrate and small effusions. Correlate for 
either CHF or an interstitial pneumonia.

## 2022-06-27 NOTE — P.CRDCN
History of Present Illness


Consult date: 06/27/22


History of present illness: 





History of Present Illness:


The patient is a 77-year-old male with a known history of hypertension, diabetes

and hyperlipidemia, history of stroke who presented to the emergency room with 

aspiration pneumonia after choking while eating a sloppy Yusuf and french fries.  

He was hypoxic on presentation.  This morning he became more dyspneic, tac

hycardic and tachypneic with evidence to suggest CHF.  He was transferred to the

ICU.  His troponin came back at 6.  According to the records patient has no 

documented history of CAD.  He has no significant chest discomfort at this time,

he is maintained on the BiPAP and starting to feel better.  He continues to have

sinus tachycardia but no evidence of ventricular ectopic activity.  He was given

IV Lasix with good response.  He is feeling more comfortable.  The patient is DO

NOT RESUSCITATE according to the CODE STATUS.  His risk factors are positive for

hypertension, hyperlipidemia and diabetes.  His chest x-ray is consistent with 

right lower lobe infiltrate and evidence of congestion.  His NT proBNP was 

elevated at 6690.


His medication at home include Flomax, Januvia, Toprol-XL 50 mg daily, 

metformin, losartan 25 mg daily, insulin, Glucotrol, Lipitor 40 mg daily and 

aspirin once a day.





Review of Systems:


Respiratory: The patient presented with symptoms of progressive dyspnea and as

piration pneumonia.


GI: No nausea and vomiting . No history of peptic ulcer disease. No recent GI bl

eed.


: No hematuria or dysuria.


Nervous System: Prior history of stroke, no seizure.





Physical Examination: 


77-year-old male, on the BiPAP, in sinus tachycardia rate of 116 blood pressure 

120/70


Head: Normocephalic.


Eyes: Sclerae nonicteric.


Neck: Good carotid upstroke, no bruit, no jugular venous distention.


Lungs: Scattered rhonchi


Heart: Regular  rhythm, tachycardic, S1-S2, no S3, no rub.  Systolic murmur at 

the base.


Abdomen: Soft nontender, positive bowel sounds no organomegaly.


Extremities: Trace edema, intact distal pulses.





Labs: 


EKG shows sinus tachycardia with intraventricular conduction delay of the left 

bundle branch block type, noted on admission.  Sodium 129, BUN 22, creatinine 

0.92.  Troponin 6.090 with NT proBNP 6690.  The blood cell 15,000, hemoglobin 

12.1 








Impression:


1.  Aspiration pneumonia with respiratory failure


2.  Evidence of non-STEMI and CHF probably exacerbated by the pneumonia and 

hypoxemia


3.  History of hypertension


4.  History of diabetes


5.  History of hyperlipidemia


6.  History of stroke





Plan:


1.  Continue IV diuretics


2.  Continue beta blocker and losartan


3.  Start IV heparin and add statin


4.  Obtain an echocardiogram with Doppler


5.  Depending on his progress further recommendations will be made.  We will 

obtain serial enzymes.  The patient most likely had a type II myocardial 

infarction related to his lung status.  There is no evidence to suggest primary 

ischemic event.


6.  Prognosis is guarded


7.  Thank you for this consult we will follow with you.





Past Medical History


Past Medical History: CVA/TIA, Diabetes Mellitus, GERD/Reflux, Hyperlipidemia, 

Hypertension, Thyroid Disorder


Additional Past Medical History / Comment(s): States had a stroke left arm and 

leg numbness


History of Any Multi-Drug Resistant Organisms: None Reported


Additional Past Surgical History / Comment(s): cataract surgery


Past Anesthesia/Blood Transfusion Reactions: No Reported Reaction


Smoking Status: Never smoker





- Past Family History


  ** Mother


Family Medical History: Diabetes Mellitus





Medications and Allergies


                                Home Medications











 Medication  Instructions  Recorded  Confirmed  Type


 


Thyroid,Pork [Bethel Thyroid] 90 mg PO DAILY 06/27/17 06/25/22 History


 


Aspirin EC [Ecotrin Low Dose] 81 mg PO DAILY 06/04/21 06/25/22 History


 


Finasteride [Proscar] 5 mg PO DAILY 06/04/21 06/25/22 History


 


Nitroglycerin Sl Tabs [Nitrostat] 0.4 mg SL Q5M PRN 06/04/21 06/25/22 History


 


Tamsulosin HCl [Flomax] 0.4 mg PO DAILY 06/04/21 06/25/22 History


 


Acetaminophen Tab [Tylenol] 325 mg PO Q6HR PRN  tab 06/09/21 06/25/22 Rx


 


Atorvastatin [Lipitor] 40 mg PO HS@2000 01/18/22 06/25/22 History


 


Sodium Chloride Tab 1 gm PO HS 01/18/22 06/25/22 History


 


sitaGLIPtin [Januvia] 100 mg PO DAILY 01/18/22 06/25/22 History


 


DULoxetine HCL 40 mg PO DAILY 06/25/22 06/25/22 History


 


Docusate [Colace] 100 mg PO HS 06/25/22 06/25/22 History


 


Insulin Glargine [Lantus Vial] 17 unit SQ HS 06/25/22 06/25/22 History


 


Losartan Potassium [Cozaar] 25 mg PO HS 06/25/22 06/25/22 History


 


Metoprolol Succinate [Toprol XL] 50 mg PO DAILY 06/25/22 06/25/22 History


 


Multivitamins, Thera [Multivitamin 1 tab PO DAILY 06/25/22 06/25/22 History





(formulary)]    


 


Sennosides [Senokot] 8.6 mg PO DAILY 06/25/22 06/25/22 History


 


Trihexyphenidyl HCl 1 mg PO TID 06/25/22 06/25/22 History


 


glipiZIDE [Glucotrol] 5 mg PO DAILY 06/25/22 06/25/22 History


 


metFORMIN  mg PO BID 06/25/22 06/25/22 History


 


traMADol HCL 50 mg PO Q6H PRN 06/25/22 06/25/22 History








                                    Allergies











Allergy/AdvReac Type Severity Reaction Status Date / Time


 


No Known Allergies Allergy   Verified 06/25/22 17:12














Physical Exam


Vitals: 


                                   Vital Signs











  Temp Pulse Pulse Resp BP BP Pulse Ox


 


 06/27/22 13:00   123 H   27 H  175/113   98


 


 06/27/22 12:00   121 H   35 H  130/81   97


 


 06/27/22 11:40   128 H   21  130/81   97


 


 06/27/22 11:24     45 H   160/80 


 


 06/27/22 11:20       


 


 06/27/22 08:05   86     


 


 06/27/22 08:00  99.0 F   96  22   128/69  100


 


 06/27/22 07:55   85      97


 


 06/27/22 00:39  98.4 F   94  24   108/61  98


 


 06/26/22 20:00  98.4 F   78  18   115/68  99


 


 06/26/22 19:40   84   18   


 


 06/26/22 19:29   83   18   


 


 06/26/22 17:15  98.7 F   94  22   116/62  100


 


 06/26/22 17:00      112/63  


 


 06/26/22 16:00      112/63  


 


 06/26/22 15:29   74     


 


 06/26/22 15:19   74     


 


 06/26/22 15:03   73   23  112/63  


 


 06/26/22 15:00   76   18  112/63   96


 


 06/26/22 14:00   80   22  106/64   96














  FiO2


 


 06/27/22 13:00 


 


 06/27/22 12:00 


 


 06/27/22 11:40 


 


 06/27/22 11:24 


 


 06/27/22 11:20  45


 


 06/27/22 08:05 


 


 06/27/22 08:00 


 


 06/27/22 07:55 


 


 06/27/22 00:39 


 


 06/26/22 20:00 


 


 06/26/22 19:40 


 


 06/26/22 19:29 


 


 06/26/22 17:15 


 


 06/26/22 17:00 


 


 06/26/22 16:00 


 


 06/26/22 15:29 


 


 06/26/22 15:19 


 


 06/26/22 15:03 


 


 06/26/22 15:00 


 


 06/26/22 14:00 








                                Intake and Output











 06/26/22 06/27/22 06/27/22





 22:59 06:59 14:59


 


Intake Total 860  10


 


Output Total 285 400 650


 


Balance 969 -385 -806


 


Intake:   


 


  Intake, IV Titration 325  10





  Amount   


 


    Piperacillin-Tazobactam 3 100  





    .375 gm In Sodium   





    Chloride 0.9% 100 ml @ 25   





    mls/hr IVPB Q8HR [facility] Rx#   





    :[id number]   


 


    Sodium Chloride 0.9% 1, 225  10





    000 ml @ 20 mls/hr IV .   





    Q24H [facility] Rx#:[id number]   


 


  Oral 500  


 


  Tube Feeding 35  


 


Output:   


 


  Urine 285 400 650


 


Other:   


 


  Voiding Method Urinal  














Results





                                 06/27/22 11:49





                                 06/27/22 11:49


                                 Cardiac Enzymes











  06/27/22 06/27/22 Range/Units





  11:49 11:49 


 


AST  60 H   (17-59)  U/L


 


Troponin I   6.090 H*  (0.000-0.034)  ng/mL








                                       CBC











  06/27/22 Range/Units





  11:49 


 


WBC  15.0 H  (3.8-10.6)  k/uL


 


RBC  3.86 L  (4.30-5.90)  m/uL


 


Hgb  12.1 L  (13.0-17.5)  gm/dL


 


Hct  36.4 L  (39.0-53.0)  %


 


Plt Count  323  (150-450)  k/uL








                          Comprehensive Metabolic Panel











  06/27/22 Range/Units





  11:49 


 


Sodium  129 L  (137-145)  mmol/L


 


Potassium  4.6  (3.5-5.1)  mmol/L


 


Chloride  99  ()  mmol/L


 


Carbon Dioxide  18 L  (22-30)  mmol/L


 


BUN  22 H  (9-20)  mg/dL


 


Creatinine  0.92  (0.66-1.25)  mg/dL


 


Glucose  260 H  (74-99)  mg/dL


 


Calcium  8.5  (8.4-10.2)  mg/dL


 


AST  60 H  (17-59)  U/L


 


ALT  28  (4-49)  U/L


 


Alkaline Phosphatase  57  ()  U/L


 


Total Protein  6.3  (6.3-8.2)  g/dL


 


Albumin  3.6  (3.5-5.0)  g/dL








                               Current Medications











Generic Name Dose Route Start Last Admin





  Trade Name Freq  PRN Reason Stop Dose Admin


 


Albuterol/Ipratropium  3 ml  06/25/22 15:36 





  Ipratropium-Albuterol 3 Ml Neb  INHALATION  





  RT-Q4H PRN  





  Shortness Of Breath Or Wheezing  


 


Albuterol/Ipratropium  3 ml  06/25/22 20:00  06/27/22 12:28





  Ipratropium-Albuterol 3 Ml Neb  INHALATION   Not Given





  RT-QID JESSICA  


 


Aspirin  81 mg  06/27/22 14:00 





  Aspirin 81 Mg  PO  





  DAILY [facility]  


 


Atorvastatin Calcium  40 mg  06/27/22 14:00 





  Atorvastatin 40 Mg Tab  PO  





  DAILY [facility]  


 


Duloxetine HCl  40 mg  06/27/22 09:00  06/27/22 09:02





  Duloxetine Hcl 20 Mg Capsule.  PO   40 mg





  DAILY [facility]   Administration


 


Famotidine  20 mg  06/26/22 09:00  06/27/22 10:50





  Famotidine 20 Mg/2 Ml Vial  IV   20 mg





  DAILY [facility]   Administration


 


Furosemide  40 mg  06/27/22 21:00 





  Furosemide 10 Mg/Ml 4 Ml Vial  IV  





  Q12HR [facility]  


 


Heparin Sodium (Porcine)  0 unit  06/27/22 13:55 





  Heparin Sodium 1,000 Un/Ml (10ml Vl)  IV  





  PER PROTOCOL PRN  





  Low PTT  





  Protocol  


 


Piperacillin Sod/Tazobactam  100 mls @ 25 mls/hr  06/26/22 00:00  06/27/22 09:02





  Sod 3.375 gm/ Sodium Chloride  IVPB   25 mls/hr





  Q8HR JESSICA   Administration





  Protocol  


 


Sodium Chloride  1,000 mls @ 20 mls/hr  06/26/22 09:00  06/27/22 13:05





  Saline 0.9%  IV   Not Given





  .Q24H JESSICA  


 


Heparin Sodium/Sodium Chloride  250 mls @ 11.975 mls/hr  06/27/22 14:00 





  25,000 unit/ Sodium Chloride  IV  





  [id number] [facility]  





  Protocol  





  12 UNITS/KG/HR  


 


Insulin Aspart  0 unit  06/25/22 17:30  06/27/22 12:41





  Insulin Aspart (Novolog) 100 Unit/Ml Vial  SQ   4 unit





  [facility]   Administration





  Protocol  


 


Losartan Potassium  25 mg  06/28/22 21:00 





  Losartan 25 Mg Tab  PO  





  HS JESSICA  


 


Methylprednisolone Sodium Succinate  60 mg  06/27/22 11:30  06/27/22 11:18





  Methylprednisolone Sod Succi 125 Mg/2 Ml Vial  IV   60 mg





  Q8HR JESSICA   Administration


 


Metoprolol Tartrate  25 mg  06/27/22 21:00 





  Metoprolol Tartrate 50 Mg Tab  PO  





  BID JESSICA  


 


Naloxone HCl  0.2 mg  06/25/22 15:36 





  Naloxone 0.4 Mg/Ml 1 Ml Vial  IV  





  Q2M PRN  





  Opioid Reversal  


 


Tamsulosin HCl  0.4 mg  06/27/22 09:00  06/27/22 09:02





  Tamsulosin 0.4 Mg Cap.Er.24h  PO   0.4 mg





  DAILY [facility]   Administration


 


Thyroid  90 mg  06/27/22 09:00  06/27/22 09:02





  Thyroid, Pork 30 Mg Tab  PO   90 mg





  DAILY [facility]   Administration


 


Tramadol HCl  50 mg  06/26/22 14:01 





  Tramadol 50 Mg Tab  PO  





  Q6H PRN  





  Pain  








                                Intake and Output











 06/26/22 06/27/22 06/27/22





 22:59 06:59 14:59


 


Intake Total 860  10


 


Output Total 285 400 650


 


Balance 575 400 -640


 


Intake:   


 


  Intake, IV Titration 325  10





  Amount   


 


    Piperacillin-Tazobactam 3 100  





    .375 gm In Sodium   





    Chloride 0.9% 100 ml @ 25   





    mls/hr IVPB Q8HR [facility] Rx#   





    :[id number]   


 


    Sodium Chloride 0.9% 1, 225  10





    000 ml @ 20 mls/hr IV .   





    Q24H [facility] Rx#:[id number]   


 


  Oral 500  


 


  Tube Feeding 35  


 


Output:   


 


  Urine 285 400 650


 


Other:   


 


  Voiding Method Urinal  








                                        





                                 06/27/22 11:49 





                                 06/27/22 11:49

## 2022-06-28 LAB
APTT BLD: 55.6 SEC (ref 22–30)
BASOPHILS # BLD AUTO: 0.1 K/UL (ref 0–0.2)
BASOPHILS NFR BLD AUTO: 1 %
EOSINOPHIL # BLD AUTO: 0.1 K/UL (ref 0–0.7)
EOSINOPHIL NFR BLD AUTO: 0 %
ERYTHROCYTE [DISTWIDTH] IN BLOOD BY AUTOMATED COUNT: 3.63 M/UL (ref 4.3–5.9)
ERYTHROCYTE [DISTWIDTH] IN BLOOD: 13.9 % (ref 11.5–15.5)
GLUCOSE BLD-MCNC: 163 MG/DL (ref 70–110)
GLUCOSE BLD-MCNC: 272 MG/DL (ref 70–110)
GLUCOSE BLD-MCNC: 274 MG/DL (ref 70–110)
GLUCOSE BLD-MCNC: 342 MG/DL (ref 70–110)
HCT VFR BLD AUTO: 34.5 % (ref 39–53)
HGB BLD-MCNC: 11.2 GM/DL (ref 13–17.5)
INR PPP: 1 (ref ?–1.2)
LYMPHOCYTES # SPEC AUTO: 1.8 K/UL (ref 1–4.8)
LYMPHOCYTES NFR SPEC AUTO: 13 %
MCH RBC QN AUTO: 30.9 PG (ref 25–35)
MCHC RBC AUTO-ENTMCNC: 32.4 G/DL (ref 31–37)
MCV RBC AUTO: 95.3 FL (ref 80–100)
MONOCYTES # BLD AUTO: 0.8 K/UL (ref 0–1)
MONOCYTES NFR BLD AUTO: 5 %
NEUTROPHILS # BLD AUTO: 11.8 K/UL (ref 1.3–7.7)
NEUTROPHILS NFR BLD AUTO: 81 %
PLATELET # BLD AUTO: 251 K/UL (ref 150–450)
PT BLD: 11.3 SEC (ref 9–12)
WBC # BLD AUTO: 14.6 K/UL (ref 3.8–10.6)

## 2022-06-28 RX ADMIN — INSULIN ASPART SCH UNIT: 100 INJECTION, SOLUTION INTRAVENOUS; SUBCUTANEOUS at 12:05

## 2022-06-28 RX ADMIN — METHYLPREDNISOLONE SODIUM SUCCINATE SCH MG: 125 INJECTION, POWDER, FOR SOLUTION INTRAMUSCULAR; INTRAVENOUS at 00:45

## 2022-06-28 RX ADMIN — INSULIN ASPART SCH UNIT: 100 INJECTION, SOLUTION INTRAVENOUS; SUBCUTANEOUS at 16:39

## 2022-06-28 RX ADMIN — PIPERACILLIN AND TAZOBACTAM SCH MLS/HR: 3; .375 INJECTION, POWDER, FOR SOLUTION INTRAVENOUS at 00:46

## 2022-06-28 RX ADMIN — INSULIN ASPART SCH UNIT: 100 INJECTION, SOLUTION INTRAVENOUS; SUBCUTANEOUS at 06:58

## 2022-06-28 RX ADMIN — FUROSEMIDE SCH MG: 10 INJECTION, SOLUTION INTRAMUSCULAR; INTRAVENOUS at 08:47

## 2022-06-28 RX ADMIN — IPRATROPIUM BROMIDE AND ALBUTEROL SULFATE SCH ML: .5; 3 SOLUTION RESPIRATORY (INHALATION) at 08:00

## 2022-06-28 RX ADMIN — CLOPIDOGREL BISULFATE SCH MG: 75 TABLET ORAL at 08:46

## 2022-06-28 RX ADMIN — HEPARIN SODIUM SCH MLS/HR: 10000 INJECTION, SOLUTION INTRAVENOUS at 09:02

## 2022-06-28 RX ADMIN — THYROID, PORCINE SCH MG: 30 TABLET ORAL at 08:51

## 2022-06-28 RX ADMIN — METOPROLOL TARTRATE SCH MG: 25 TABLET, FILM COATED ORAL at 20:47

## 2022-06-28 RX ADMIN — IPRATROPIUM BROMIDE AND ALBUTEROL SULFATE SCH ML: .5; 3 SOLUTION RESPIRATORY (INHALATION) at 11:34

## 2022-06-28 RX ADMIN — PIPERACILLIN AND TAZOBACTAM SCH MLS/HR: 3; .375 INJECTION, POWDER, FOR SOLUTION INTRAVENOUS at 08:48

## 2022-06-28 RX ADMIN — TAMSULOSIN HYDROCHLORIDE SCH MG: 0.4 CAPSULE ORAL at 08:46

## 2022-06-28 RX ADMIN — METOPROLOL TARTRATE SCH MG: 25 TABLET, FILM COATED ORAL at 08:47

## 2022-06-28 RX ADMIN — INSULIN ASPART SCH UNIT: 100 INJECTION, SOLUTION INTRAVENOUS; SUBCUTANEOUS at 20:46

## 2022-06-28 RX ADMIN — PIPERACILLIN AND TAZOBACTAM SCH MLS/HR: 3; .375 INJECTION, POWDER, FOR SOLUTION INTRAVENOUS at 16:11

## 2022-06-28 RX ADMIN — ASPIRIN 81 MG CHEWABLE TABLET SCH MG: 81 TABLET CHEWABLE at 08:47

## 2022-06-28 RX ADMIN — ATORVASTATIN CALCIUM SCH MG: 40 TABLET, FILM COATED ORAL at 08:46

## 2022-06-28 RX ADMIN — SPIRONOLACTONE SCH MG: 25 TABLET, FILM COATED ORAL at 08:47

## 2022-06-28 RX ADMIN — IPRATROPIUM BROMIDE AND ALBUTEROL SULFATE SCH ML: .5; 3 SOLUTION RESPIRATORY (INHALATION) at 19:20

## 2022-06-28 RX ADMIN — CEFAZOLIN SCH MLS/HR: 330 INJECTION, POWDER, FOR SOLUTION INTRAMUSCULAR; INTRAVENOUS at 16:39

## 2022-06-28 RX ADMIN — IPRATROPIUM BROMIDE AND ALBUTEROL SULFATE SCH ML: .5; 3 SOLUTION RESPIRATORY (INHALATION) at 15:22

## 2022-06-28 RX ADMIN — LOSARTAN POTASSIUM SCH MG: 25 TABLET, FILM COATED ORAL at 21:03

## 2022-06-28 RX ADMIN — INSULIN DETEMIR SCH UNIT: 100 INJECTION, SOLUTION SUBCUTANEOUS at 20:47

## 2022-06-28 RX ADMIN — FAMOTIDINE SCH MG: 10 INJECTION, SOLUTION INTRAVENOUS at 08:48

## 2022-06-28 RX ADMIN — FUROSEMIDE SCH MG: 10 INJECTION, SOLUTION INTRAMUSCULAR; INTRAVENOUS at 20:47

## 2022-06-28 RX ADMIN — NITROGLYCERIN SCH INCH: 20 OINTMENT TOPICAL at 16:11

## 2022-06-28 RX ADMIN — NITROGLYCERIN SCH INCH: 20 OINTMENT TOPICAL at 08:48

## 2022-06-28 NOTE — P.PN
Subjective


Progress Note Date: 06/28/22








This is 77-year-old male patient from a [facility] care facility who has a history

of diabetes mellitus, CVA/TIA, GERD, hyperlipidemia, hypertension, 

hypothyroidism.  He was brought into the emergency room yesterday by EMS after 

choking while eating a sloppy Yusuf and french fries.  He was quite hypoxic at the

[facility] and when paramedics arrived patient's O2 saturation was in the 60s.  He is 

seen today in the intensive care unit.  He is sitting up.  Awake and alert.  

Somewhat slow to respond.  Oriented 2.  He is currently on 40% Ventimask.  No 

IV fluids.  He was initiated on Zosyn.  Chest x-ray reveals some bilateral 

reticular nodular infiltrates right greater than left.  White count 16.1.  

Hemoglobin 12.0.  Platelets 242.  Sodium 125.  Potassium 4.9.  Bicarb 20.  BUN 

18.  Creatinine 0.7.  Lactic acid 2.0.  AST 84.  ALT 23.  Glucose 242.








06/27/2022, I'm seeing this patient on a medical floor.  I was asked by the 

nursing staff to evaluate him as soon as possible as the patient developed acute

shortness of breath.  At the time of my arrival, the patient was in significant 

respiratory distress.  He was tachycardic, tachypneic, he had a congested cough,

diaphoretic, and was having labored breathing.  No further bouts of aspiration 

per the nursing staff.  The patient was admitted yesterday to the hospital 

because of an aspiration pneumonia started on IV Zosyn.  At that point, a workup

was initiated.  Chest x-ray was ordered that showed lower lobe pulmonary 

infiltrates and pulmonary vessel congestion.  The patient was given a dose of 

Lasix 40 mg IV.  The patient was given IV Solu-Medrol.  The patient was started 

on bronchodilators.  Following that, I started the patient on BiPAP at a pre

ssure of 10/5 cm of water with an FiO2 of 100% and the patient a chance to the 

intensive care unit with understanding that he has a DNR/DNI CODE STATUS.  The 

patient was later on evaluated in the ICU and the patient was already feeling 

better and diuresing.  EKG showed sinus tachycardia.  Troponin came back at 6 

and the patient suffered an acute non-ST segment elevation myocardial 

infarctions the patient will be seen by cardiology.  The white cell count of 

15.0 with hemoglobin 12.1.  The patient's sodium is up to 129, BUN is at 22 with

a creatinine of 0.9 and the potassium level of 4.6.  The proBNP level was 6690.





06/28/2022, the patient is in the intensive care unit.  Events from yesterday 

was noted and the patient obviously went into an acute pulmonary edema on top of

an aspiration pneumonia.  The patient was brought to the intensive care unit.  

The patient was supported by BiPAP.  The patient was diuresed.  Further 

investigation revealed that the patient suffered an acute non-ST segment 

elevation myocardial infarction.  Troponin peaked at 15.  Cardiac rhythm remains

sinus.  The patient got diuresed with IV Lasix.  Produced excellent urine output

and ultimately his breathing improved and the patient was taken off the BiPAP.  

This morning, the patient is on oxygen at 5 L nasal cannula.  The chest x-ray 

showing bilateral lower lobe pulmonary infiltrates in addition to pulmonary 

edema.  The patient remains on IV Zosyn.  Breathing remains slightly labored 

although is more comfortable.  His respiratory rate currently is in the mid 20s.

 The troponin trend as mentioned was up to 13.6 this morning.  His initial 

troponin was at 6.0.  Echocardiogram was repeated and the patient was found to 

have marked impairment of LV function.  The patient's ejection fraction was down

to 25-30%.  This was a suboptimal study due to the poor echo window.  

Nevertheless, LV function was severely diminished with an ejection fraction of 

25-30% and the apex was hypokinetic.  No other valvular abnormalities noted.  

Aortic valve was within normal limits.  Tricuspid valve and the mitral valves 

were also within normal limits.  On today's evaluation, the patient has a white 

cell count of 14.6 with a hemoglobin of 11.2.  Normal coagulation profile noted 

the patient is on IV heparin and the PTT currently it is therapeutic at 55.6.  

Blood sugar is at 163.  The rest of the electrolytes are still pending for now. 

Meanwhile, his fluid balance has been -2.4 L over the past 24 hours.  The patien

t remains on IV Zosyn.  The patient remains on IV Lasix 40 mg every 12 hours.  

The patient remains on IV heparin.  The patient is on Levemir insulin 10 units 

in addition to a sliding scale coverage.  Rest of the home medications of been 

ordered resume.  He is on aspirin.  He is on metoprolol 25 mg by mouth twice a 

day.  He is on no pressors for now.  Kennedy catheter is in place.  

Neurologically, he does communicate yet his communication is limited.  He is 

alert and oriented 2.  He is moving all 4 extremities.





Objective





- Vital Signs


Vital signs: 


                                   Vital Signs











Temp  98.6 F   06/28/22 04:00


 


Pulse  79   06/28/22 08:09


 


Resp  10 L  06/28/22 07:00


 


BP  115/73   06/28/22 07:00


 


Pulse Ox  99   06/28/22 08:02


 


FiO2  40   06/28/22 04:19








                                 Intake & Output











 06/27/22 06/28/22 06/28/22





 18:59 06:59 18:59


 


Intake Total 170 371.659 10


 


Output Total 1870 1140 30


 


Balance -1700 -768.341 -20


 


Weight  100.9 kg 


 


Intake:   


 


  IV  140 10


 


    Piperacillin-Tazobactam 3  100 





    .375 gm In Sodium   





    Chloride 0.9% 100 ml @ 25   





    mls/hr IVPB Q8HR JESSICA Rx#   





    [id number]   


 


    Sodium Chloride 0.9% 1,  40 10





    000 ml @ 20 mls/hr IV .   





    Q24H JESSICA Rx#:[id number]   


 


  Intake, IV Titration 170 111.659 





  Amount   


 


    Heparin Sod,Pork in 0.45%  81.659 





    NaCl 25,000 unit In 0.45   





    % NaCl 1 250ml.bag @ 10.   





    02 UNITS/KG/HR 9.999 mls/   





    hr IV .Q24H JESSICA Rx#:   





    [id number]   


 


    Piperacillin-Tazobactam 3 100  





    .375 gm In Sodium   





    Chloride 0.9% 100 ml @ 25   





    mls/hr IVPB Q8HR JESSICA Rx#   





    [id number]   


 


    Sodium Chloride 0.9% 1, 70 30 





    000 ml @ 20 mls/hr IV .   





    Q24H JESSICA Rx#:[id number]   


 


  Oral  120 


 


Output:   


 


  Urine 1870 1140 30


 


Other:   


 


  Voiding Method Indwelling Catheter Indwelling Catheter 














- Exam








GENERAL EXAM: Alert, oriented 2, 77-year-old male patient, and breathing is 

less labored and the patient is currently on 5 L of O2 nasal cannula


HEAD: Normocephalic.


EYES: Normal reaction of pupils, equal size.


NOSE: Clear with pink turbinates.


THROAT: No erythema or exudates.


NECK: No masses, no JVD.


CHEST: No chest wall deformity.


LUNGS: Equal air entry with bilateral scattered rhonchi.  Crackles in lung bases

are still present


CVS: S1 and S2 normal with no audible murmur, regular rhythm.


ABDOMEN: No hepatosplenomegaly, normal bowel sounds, no guarding or rigidity.


SPINE: No scoliosis or deformity


SKIN: No rashes


CENTRAL NERVOUS SYSTEM: No focal deficits, tone is normal in all 4 extremities.


EXTREMITIES: There is no peripheral edema.  No clubbing, no cyanosis.  Periphe

ral pulses are intact.








- Labs


CBC & Chem 7: 


                                 06/28/22 04:04





                                 06/27/22 11:49


Labs: 


                  Abnormal Lab Results - Last 24 Hours (Table)











  06/27/22 06/27/22 06/27/22 Range/Units





  11:12 11:49 11:49 


 


WBC   15.0 H   (3.8-10.6)  k/uL


 


RBC   3.86 L   (4.30-5.90)  m/uL


 


Hgb   12.1 L   (13.0-17.5)  gm/dL


 


Hct   36.4 L   (39.0-53.0)  %


 


Neutrophils #   10.4 H   (1.3-7.7)  k/uL


 


Lymphocytes #     (1.0-4.8)  k/uL


 


APTT     (22.0-30.0)  sec


 


Sodium    129 L  (137-145)  mmol/L


 


Carbon Dioxide    18 L  (22-30)  mmol/L


 


BUN    22 H  (9-20)  mg/dL


 


Glucose    260 H  (74-99)  mg/dL


 


POC Glucose (mg/dL)  209 H    ()  mg/dL


 


AST    60 H  (17-59)  U/L


 


Troponin I     (0.000-0.034)  ng/mL


 


HDL Cholesterol     (40.00-60.00)  mg/dL














  06/27/22 06/27/22 06/27/22 Range/Units





  11:49 14:33 14:33 


 


WBC   15.6 H   (3.8-10.6)  k/uL


 


RBC   3.89 L   (4.30-5.90)  m/uL


 


Hgb   12.4 L   (13.0-17.5)  gm/dL


 


Hct   36.7 L   (39.0-53.0)  %


 


Neutrophils #   14.3 H   (1.3-7.7)  k/uL


 


Lymphocytes #   0.6 L   (1.0-4.8)  k/uL


 


APTT    21.3 L  (22.0-30.0)  sec


 


Sodium     (137-145)  mmol/L


 


Carbon Dioxide     (22-30)  mmol/L


 


BUN     (9-20)  mg/dL


 


Glucose     (74-99)  mg/dL


 


POC Glucose (mg/dL)     ()  mg/dL


 


AST     (17-59)  U/L


 


Troponin I  6.090 H*    (0.000-0.034)  ng/mL


 


HDL Cholesterol     (40.00-60.00)  mg/dL














  06/27/22 06/27/22 06/27/22 Range/Units





  14:33 14:33 16:43 


 


WBC     (3.8-10.6)  k/uL


 


RBC     (4.30-5.90)  m/uL


 


Hgb     (13.0-17.5)  gm/dL


 


Hct     (39.0-53.0)  %


 


Neutrophils #     (1.3-7.7)  k/uL


 


Lymphocytes #     (1.0-4.8)  k/uL


 


APTT     (22.0-30.0)  sec


 


Sodium     (137-145)  mmol/L


 


Carbon Dioxide     (22-30)  mmol/L


 


BUN     (9-20)  mg/dL


 


Glucose     (74-99)  mg/dL


 


POC Glucose (mg/dL)    270 H  ()  mg/dL


 


AST     (17-59)  U/L


 


Troponin I   5.790 H*   (0.000-0.034)  ng/mL


 


HDL Cholesterol  60.40 H    (40.00-60.00)  mg/dL














  06/27/22 06/27/22 06/27/22 Range/Units





  17:36 20:43 20:45 


 


WBC     (3.8-10.6)  k/uL


 


RBC     (4.30-5.90)  m/uL


 


Hgb     (13.0-17.5)  gm/dL


 


Hct     (39.0-53.0)  %


 


Neutrophils #     (1.3-7.7)  k/uL


 


Lymphocytes #     (1.0-4.8)  k/uL


 


APTT     (22.0-30.0)  sec


 


Sodium     (137-145)  mmol/L


 


Carbon Dioxide     (22-30)  mmol/L


 


BUN     (9-20)  mg/dL


 


Glucose     (74-99)  mg/dL


 


POC Glucose (mg/dL)   321 H  334 H  ()  mg/dL


 


AST     (17-59)  U/L


 


Troponin I  13.600 H*    (0.000-0.034)  ng/mL


 


HDL Cholesterol     (40.00-60.00)  mg/dL














  06/28/22 06/28/22 06/28/22 Range/Units





  04:04 04:04 06:48 


 


WBC  14.6 H    (3.8-10.6)  k/uL


 


RBC  3.63 L    (4.30-5.90)  m/uL


 


Hgb  11.2 L    (13.0-17.5)  gm/dL


 


Hct  34.5 L    (39.0-53.0)  %


 


Neutrophils #  11.8 H    (1.3-7.7)  k/uL


 


Lymphocytes #     (1.0-4.8)  k/uL


 


APTT   55.6 H   (22.0-30.0)  sec


 


Sodium     (137-145)  mmol/L


 


Carbon Dioxide     (22-30)  mmol/L


 


BUN     (9-20)  mg/dL


 


Glucose     (74-99)  mg/dL


 


POC Glucose (mg/dL)    163 H  ()  mg/dL


 


AST     (17-59)  U/L


 


Troponin I     (0.000-0.034)  ng/mL


 


HDL Cholesterol     (40.00-60.00)  mg/dL








                      Microbiology - Last 24 Hours (Table)











 06/25/22 16:01 Blood Culture - Preliminary





 Blood    No Growth after 48 hours


 


 06/25/22 16:01 Blood Culture - Preliminary





 Blood    No Growth after 48 hours














Assessment and Plan


Plan: 








Acute hypoxemic respiratory failure secondary to aspiration and acute pulmonary 

edema.  The patient was in severe respiratory distress when he arrived to the 

intensive care unit.  Supported by the BiPAP.  The patient was diuresed with IV 

Lasix.  The patient's was also covered with IV Zosyn.  He did suffer an acute 

non-ST segment elevation myocardial infarction.  Currently off BiPAP and the 

patient is currently on 5 L of oxygen by nasal cannula





Acute aspiration on Zosyn





Acute pulmonary edema with acute systolic heart failure and an ejection fraction

of 25-30%





Acute non-ST segment elevation myocardial infarction and initial troponin was at

  13





Hyponatremia, improving sodium level is up 129, awaiting follow-up levels from 

today





History of CVA/TIA





Hyperlipidemia





Hypertension





Hypothyroidism





Benign prostatic hyperplasia





Diabetes mellitus





Nursing home resident





Altered mentation patient remains alert and oriented 2 with limited amount of 

communication skills





Dysphagia with poor swallowing skills probably related to comorbidities and a 

previous stroke.  Swallow evaluation  needs to be done





Plan





DNR/DNI CODE STATUS


Continue BiPAP therapy for respiratory support, on and off.  The patient is 

currently off the BiPAP on 5 L oxygen by nasal cannula and his seems to be quite

comfortable.


Continue Lasix 40 mg IV every 12 hours


Kennedy catheter in place and monitor the urine output


Yany IV Solu-Medrol


Continue IV Zosyn


Repeat EKG today


Consult cardiology


Follow-up chest x-ray in a.m.


Obtain a official swallow evaluation by speech pathology to evaluate his swallow

and his risk of aspiration.


Monitor sodium level and the patient is currently at O, awaiting electrodes 

from today


Condition is obviously critical baseline above-mentioned comorbidities.  We'll 

continue to follow. 


Continued IV heparin


Continue aspirin


Continue metoprolol


Continue statins


Home medications have been resumed and the patient be kept in ICU for another 24

hours.

## 2022-06-28 NOTE — P.PN
Subjective


Progress Note Date: 06/28/22





PROGRESS NOTE


The patient is a 77-year-old male with a history of diabetes, hypertension and 

hyperlipidemia, prior history of stroke who presented with aspiration pneumonia,

yesterday had an acute dyspneic event with evidence of pulmonary edema.  He had 

elevation of his troponin was transferred to the ICU.  He continues to be in 

sinus mechanism, his blood pressure is stable.  He is complaining of some chest 

discomfort and dyspnea this morning.  He denies any dizziness or palpitations.  

His urinary output has been good and he continues to be on IV Lasix.  His 

echocardiogram showed a severely impaired systolic function with apical 

hypokinesis.  The patient is DO NOT RESUSCITATE.


He continues to be on aspirin, Lipitor 40 mg daily, Lasix 40 mg IV every 12 

hours him on Cozaar 25 mg daily, metoprolol tartrate 25 mg twice a day, IV 

heparin.


PHYSICAL EXAMINATION:


Blood pressure 115/70 heart rate 60


LUNGS: Mild decrease in the breath sounds at the bases


HEART: Regular rate and rhythm, S1, S2. No S3.  systolic ejection murmur


ABDOMEN: Soft, nontender, no organomegaly


EXTREMETIES: No edema





LAB:


EKG shows sinus mechanism with T-wave inversion in 1 and aVL but no acute 

changes.  Hemoglobin of 11.2, troponin 13.6.


IMPRESSION:


1.  Acute pulmonary edema with non-STEMI and severe cardiomyopathy of unknown 

duration or etiology, could be a combination of ischemic and nonischemic.


2.  Aspiration pneumonia


3.  History of diabetes


4.  History of stroke


5.  History of hypertension





PLAN:


1.  Add nitrate 


2.  I had a long discussion with the patient regarding the options including 

cardiac catheterization versus maximum medical therapy.  The patient does not 

want to proceed with any aggressive workup.


3.  Continue IV diuresis


4.  Add Plavix and spironolactone


5.  Prognosis is guarded


 








Objective





- Vital Signs


Vital signs: 


                                   Vital Signs











Temp  98.6 F   06/28/22 04:00


 


Pulse  64   06/28/22 06:00


 


Resp  19   06/28/22 06:00


 


BP  110/71   06/28/22 06:00


 


Pulse Ox  100   06/28/22 06:00


 


FiO2  40   06/28/22 04:19








                                 Intake & Output











 06/27/22 06/28/22 06/28/22





 18:59 06:59 18:59


 


Intake Total 170 361.659 


 


Output Total 1870 1110 


 


Balance -1700 -748.341 


 


Weight  100.9 kg 


 


Intake:   


 


  IV  130 


 


    Piperacillin-Tazobactam 3  100 





    .375 gm In Sodium   





    Chloride 0.9% 100 ml @ 25   





    mls/hr IVPB Q8HR [facility]ESS[facility] Rx#   





    :[id number]   


 


    Sodium Chloride 0.9% 1,  30 





    000 ml @ 20 mls/hr IV .   





    Q24H JESSICA Rx#:[id number]   


 


  Intake, IV Titration 170 111.659 





  Amount   


 


    Heparin Sod,Pork in 0.45%  81.659 





    NaCl 25,000 unit In 0.45   





    % NaCl 1 250ml.bag @ 10.   





    02 UNITS/KG/HR 9.999 mls/   





    hr IV .Q24H JESSICA Rx#:   





    [id number]   


 


    Piperacillin-Tazobactam 3 100  





    .375 gm In Sodium   





    Chloride 0.9% 100 ml @ 25   





    mls/hr IVPB Q8HR JESSICA Rx#   





    [id number]   


 


    Sodium Chloride 0.9% 1, 70 30 





    000 ml @ 20 mls/hr IV .   





    Q24H JESSICA Rx#:[id number]   


 


  Oral  120 


 


Output:   


 


  Urine 1870 1110 


 


Other:   


 


  Voiding Method Indwelling Catheter Indwelling Catheter 














- Labs


CBC & Chem 7: 


                                 06/28/22 04:04





                                 06/27/22 11:49


Labs: 


                  Abnormal Lab Results - Last 24 Hours (Table)











  06/27/22 06/27/22 06/27/22 Range/Units





  11:12 11:49 11:49 


 


WBC   15.0 H   (3.8-10.6)  k/uL


 


RBC   3.86 L   (4.30-5.90)  m/uL


 


Hgb   12.1 L   (13.0-17.5)  gm/dL


 


Hct   36.4 L   (39.0-53.0)  %


 


Neutrophils #   10.4 H   (1.3-7.7)  k/uL


 


Lymphocytes #     (1.0-4.8)  k/uL


 


APTT     (22.0-30.0)  sec


 


Sodium    129 L  (137-145)  mmol/L


 


Carbon Dioxide    18 L  (22-30)  mmol/L


 


BUN    22 H  (9-20)  mg/dL


 


Glucose    260 H  (74-99)  mg/dL


 


POC Glucose (mg/dL)  209 H    ()  mg/dL


 


AST    60 H  (17-59)  U/L


 


Troponin I     (0.000-0.034)  ng/mL


 


HDL Cholesterol     (40.00-60.00)  mg/dL














  06/27/22 06/27/22 06/27/22 Range/Units





  11:49 14:33 14:33 


 


WBC   15.6 H   (3.8-10.6)  k/uL


 


RBC   3.89 L   (4.30-5.90)  m/uL


 


Hgb   12.4 L   (13.0-17.5)  gm/dL


 


Hct   36.7 L   (39.0-53.0)  %


 


Neutrophils #   14.3 H   (1.3-7.7)  k/uL


 


Lymphocytes #   0.6 L   (1.0-4.8)  k/uL


 


APTT    21.3 L  (22.0-30.0)  sec


 


Sodium     (137-145)  mmol/L


 


Carbon Dioxide     (22-30)  mmol/L


 


BUN     (9-20)  mg/dL


 


Glucose     (74-99)  mg/dL


 


POC Glucose (mg/dL)     ()  mg/dL


 


AST     (17-59)  U/L


 


Troponin I  6.090 H*    (0.000-0.034)  ng/mL


 


HDL Cholesterol     (40.00-60.00)  mg/dL














  06/27/22 06/27/22 06/27/22 Range/Units





  14:33 14:33 16:43 


 


WBC     (3.8-10.6)  k/uL


 


RBC     (4.30-5.90)  m/uL


 


Hgb     (13.0-17.5)  gm/dL


 


Hct     (39.0-53.0)  %


 


Neutrophils #     (1.3-7.7)  k/uL


 


Lymphocytes #     (1.0-4.8)  k/uL


 


APTT     (22.0-30.0)  sec


 


Sodium     (137-145)  mmol/L


 


Carbon Dioxide     (22-30)  mmol/L


 


BUN     (9-20)  mg/dL


 


Glucose     (74-99)  mg/dL


 


POC Glucose (mg/dL)    270 H  ()  mg/dL


 


AST     (17-59)  U/L


 


Troponin I   5.790 H*   (0.000-0.034)  ng/mL


 


HDL Cholesterol  60.40 H    (40.00-60.00)  mg/dL














  06/27/22 06/27/22 06/27/22 Range/Units





  17:36 20:43 20:45 


 


WBC     (3.8-10.6)  k/uL


 


RBC     (4.30-5.90)  m/uL


 


Hgb     (13.0-17.5)  gm/dL


 


Hct     (39.0-53.0)  %


 


Neutrophils #     (1.3-7.7)  k/uL


 


Lymphocytes #     (1.0-4.8)  k/uL


 


APTT     (22.0-30.0)  sec


 


Sodium     (137-145)  mmol/L


 


Carbon Dioxide     (22-30)  mmol/L


 


BUN     (9-20)  mg/dL


 


Glucose     (74-99)  mg/dL


 


POC Glucose (mg/dL)   321 H  334 H  ()  mg/dL


 


AST     (17-59)  U/L


 


Troponin I  13.600 H*    (0.000-0.034)  ng/mL


 


HDL Cholesterol     (40.00-60.00)  mg/dL














  06/28/22 06/28/22 06/28/22 Range/Units





  04:04 04:04 06:48 


 


WBC  14.6 H    (3.8-10.6)  k/uL


 


RBC  3.63 L    (4.30-5.90)  m/uL


 


Hgb  11.2 L    (13.0-17.5)  gm/dL


 


Hct  34.5 L    (39.0-53.0)  %


 


Neutrophils #  11.8 H    (1.3-7.7)  k/uL


 


Lymphocytes #     (1.0-4.8)  k/uL


 


APTT   55.6 H   (22.0-30.0)  sec


 


Sodium     (137-145)  mmol/L


 


Carbon Dioxide     (22-30)  mmol/L


 


BUN     (9-20)  mg/dL


 


Glucose     (74-99)  mg/dL


 


POC Glucose (mg/dL)    163 H  ()  mg/dL


 


AST     (17-59)  U/L


 


Troponin I     (0.000-0.034)  ng/mL


 


HDL Cholesterol     (40.00-60.00)  mg/dL








                      Microbiology - Last 24 Hours (Table)











 06/25/22 16:01 Blood Culture - Preliminary





 Blood    No Growth after 48 hours


 


 06/25/22 16:01 Blood Culture - Preliminary





 Blood    No Growth after 48 hours

## 2022-06-28 NOTE — P.PN
Subjective


Progress Note Date: 06/28/22


Principal diagnosis: 





CC: Shortness of breath





Patient is a poor historian likely due to old age and chronic debility.  Patient

stating that his breathing is okay.  He was seen in the ICU and is currently on 

BiPAP.  Patient is AAO 3 and is following commands.





Objective





- Vital Signs


Vital signs: 


                                   Vital Signs











Temp  98.6 F   06/28/22 04:00


 


Pulse  73   06/28/22 14:00


 


Resp  22   06/28/22 14:00


 


BP  114/76   06/28/22 14:00


 


Pulse Ox  97   06/28/22 14:00


 


FiO2  40   06/28/22 04:19








                                 Intake & Output











 06/27/22 06/28/22 06/28/22





 18:59 06:59 18:59


 


Intake Total 170 371.659 259.75


 


Output Total 1870 1140 577


 


Balance -1700 -768.341 -317.25


 


Weight  100.9 kg 


 


Intake:   


 


  IV  140 120


 


    Piperacillin-Tazobactam 3  100 100





    .375 gm In Sodium   





    Chloride 0.9% 100 ml @ 25   





    mls/hr IVPB Q8HR [facility] Rx#   





    :[id number]   


 


    Sodium Chloride 0.9% 1,  40 20





    000 ml @ 20 mls/hr IV .   





    Q24H [facility]ESS[facility] Rx#:[id number]   


 


  Intake, IV Titration 170 111.659 139.75





  Amount   


 


    Heparin Sod,Pork in 0.45%  81.659 139.75





    NaCl 25,000 unit In 0.45   





    % NaCl 1 250ml.bag @ 10.   





    02 UNITS/KG/HR 9.999 mls/   





    hr IV .Q24H [facility]ESS[facility] Rx#:   





    [id number]   


 


    Piperacillin-Tazobactam 3 100  





    .375 gm In Sodium   





    Chloride 0.9% 100 ml @ 25   





    mls/hr IVPB Q8HR [facility] Rx#   





    :[id number]   


 


    Sodium Chloride 0.9% 1, 70 30 





    000 ml @ 20 mls/hr IV .   





    Q24H [facility]ESS[facility] Rx#:[id number]   


 


  Oral  120 


 


Output:   


 


  Urine 1870 1140 577


 


Other:   


 


  Voiding Method Indwelling Catheter Indwelling Catheter Indwelling Catheter














- Exam





General examination - Alert  and Oriented 3 in NAD


Heart - + S1S2  no murmurs


Lungs - diminished breath sounds bilaterally, on BiPAP


Abdomen soft NT ND +ve BS


Extremities - trace bilateral pitting edema


CNS - Moving all 4 extremities spontaneously


Psych - Calm and cooperative, mildly confused





- Labs


CBC & Chem 7: 


                                 06/28/22 04:04





                                 06/27/22 11:49


Labs: 


                  Abnormal Lab Results - Last 24 Hours (Table)











  06/27/22 06/27/22 06/27/22 Range/Units





  14:33 14:33 14:33 


 


WBC  15.6 H    (3.8-10.6)  k/uL


 


RBC  3.89 L    (4.30-5.90)  m/uL


 


Hgb  12.4 L    (13.0-17.5)  gm/dL


 


Hct  36.7 L    (39.0-53.0)  %


 


Neutrophils #  14.3 H    (1.3-7.7)  k/uL


 


Lymphocytes #  0.6 L    (1.0-4.8)  k/uL


 


APTT   21.3 L   (22.0-30.0)  sec


 


POC Glucose (mg/dL)     ()  mg/dL


 


Troponin I     (0.000-0.034)  ng/mL


 


HDL Cholesterol    60.40 H  (40.00-60.00)  mg/dL














  06/27/22 06/27/22 06/27/22 Range/Units





  14:33 16:43 17:36 


 


WBC     (3.8-10.6)  k/uL


 


RBC     (4.30-5.90)  m/uL


 


Hgb     (13.0-17.5)  gm/dL


 


Hct     (39.0-53.0)  %


 


Neutrophils #     (1.3-7.7)  k/uL


 


Lymphocytes #     (1.0-4.8)  k/uL


 


APTT     (22.0-30.0)  sec


 


POC Glucose (mg/dL)   270 H   ()  mg/dL


 


Troponin I  5.790 H*   13.600 H*  (0.000-0.034)  ng/mL


 


HDL Cholesterol     (40.00-60.00)  mg/dL














  06/27/22 06/27/22 06/28/22 Range/Units





  20:43 20:45 04:04 


 


WBC    14.6 H  (3.8-10.6)  k/uL


 


RBC    3.63 L  (4.30-5.90)  m/uL


 


Hgb    11.2 L  (13.0-17.5)  gm/dL


 


Hct    34.5 L  (39.0-53.0)  %


 


Neutrophils #    11.8 H  (1.3-7.7)  k/uL


 


Lymphocytes #     (1.0-4.8)  k/uL


 


APTT     (22.0-30.0)  sec


 


POC Glucose (mg/dL)  321 H  334 H   ()  mg/dL


 


Troponin I     (0.000-0.034)  ng/mL


 


HDL Cholesterol     (40.00-60.00)  mg/dL














  06/28/22 06/28/22 06/28/22 Range/Units





  04:04 06:48 11:29 


 


WBC     (3.8-10.6)  k/uL


 


RBC     (4.30-5.90)  m/uL


 


Hgb     (13.0-17.5)  gm/dL


 


Hct     (39.0-53.0)  %


 


Neutrophils #     (1.3-7.7)  k/uL


 


Lymphocytes #     (1.0-4.8)  k/uL


 


APTT  55.6 H    (22.0-30.0)  sec


 


POC Glucose (mg/dL)   163 H  274 H  ()  mg/dL


 


Troponin I     (0.000-0.034)  ng/mL


 


HDL Cholesterol     (40.00-60.00)  mg/dL








                      Microbiology - Last 24 Hours (Table)











 06/25/22 16:01 Blood Culture - Preliminary





 Blood    No Growth after 48 hours


 


 06/25/22 16:01 Blood Culture - Preliminary





 Blood    No Growth after 48 hours














Assessment and Plan


Assessment: 





Acute hypoxic respiratory failure secondary to aspiration acute pulmonary edema


Acute systolic heart failure with an ejection fraction 25-30%


Aspiration pneumonia


COPD


-Continue intermittent BiPAP.  Patient currently alternating between BiPAP and 5

L nasal cannula


-Resume IV Lasix 40 mg twice a day and Aldactone


-Patient on losartan and metoprolol


-Resume IV Zosyn


-Resume DuoNeb


-Cardiology and pulmonology on board


-Speech eval








Non-ST segment elevation myocardial infarction


-Resume IV heparin, aspirin metoprolol Plavix and statin


-Patient to cardiology that he does not want any aggressive measures





Hyponatremia


-Trend BMP


-BMP pending from this morning





History of CVA/TIA


-Resume aspirin and statin





Hypertension


-Resume losartan, metoprolol





Hypothyroidism


-Resume [place] Thyroid





Benign prostatic hyperplasia


-Resume Flomax





Diabetes mellitus


-Sliding-scale insulin


CODE STATUS: DNR/DNI


Anticipated discharge: Depending on hospital course

## 2022-06-28 NOTE — XR
EXAMINATION TYPE: XR chest 1V

 

DATE OF EXAM: 6/28/2022

 

COMPARISON: 6/27/2022

 

HISTORY: Shortness of breath

 

FINDINGS:

There are bilateral pleural effusions with cardiomegaly and bibasilar infiltrate.  There is a diffuse
 interstitial pattern. 

Atherosclerotic change aorta. No pneumothorax. Diffuse osteopenia with arthropathy of the shoulders.

 

IMPRESSION:

1. Progressive pleural effusions. Diffuse pleural-parenchymal changes correlate for CHF versus diffus
e pneumonia.

## 2022-06-29 LAB
ANION GAP SERPL CALC-SCNC: 4 MMOL/L
BUN SERPL-SCNC: 28 MG/DL (ref 9–20)
CALCIUM SPEC-MCNC: 8.5 MG/DL (ref 8.4–10.2)
CHLORIDE SERPL-SCNC: 101 MMOL/L (ref 98–107)
CO2 SERPL-SCNC: 28 MMOL/L (ref 22–30)
ERYTHROCYTE [DISTWIDTH] IN BLOOD BY AUTOMATED COUNT: 3.69 M/UL (ref 4.3–5.9)
ERYTHROCYTE [DISTWIDTH] IN BLOOD: 14.3 % (ref 11.5–15.5)
GLUCOSE BLD-MCNC: 128 MG/DL (ref 70–110)
GLUCOSE BLD-MCNC: 225 MG/DL (ref 70–110)
GLUCOSE BLD-MCNC: 262 MG/DL (ref 70–110)
GLUCOSE BLD-MCNC: 325 MG/DL (ref 70–110)
GLUCOSE SERPL-MCNC: 131 MG/DL (ref 74–99)
HCT VFR BLD AUTO: 34.9 % (ref 39–53)
HGB BLD-MCNC: 11.8 GM/DL (ref 13–17.5)
MCH RBC QN AUTO: 31.8 PG (ref 25–35)
MCHC RBC AUTO-ENTMCNC: 33.7 G/DL (ref 31–37)
MCV RBC AUTO: 94.4 FL (ref 80–100)
PLATELET # BLD AUTO: 269 K/UL (ref 150–450)
POTASSIUM SERPL-SCNC: 3.8 MMOL/L (ref 3.5–5.1)
SODIUM SERPL-SCNC: 133 MMOL/L (ref 137–145)
WBC # BLD AUTO: 12.1 K/UL (ref 3.8–10.6)

## 2022-06-29 RX ADMIN — NITROGLYCERIN SCH INCH: 20 OINTMENT TOPICAL at 00:11

## 2022-06-29 RX ADMIN — INSULIN ASPART SCH: 100 INJECTION, SOLUTION INTRAVENOUS; SUBCUTANEOUS at 06:46

## 2022-06-29 RX ADMIN — HEPARIN SODIUM SCH UNIT: 5000 INJECTION INTRAVENOUS; SUBCUTANEOUS at 21:43

## 2022-06-29 RX ADMIN — IPRATROPIUM BROMIDE AND ALBUTEROL SULFATE SCH ML: .5; 3 SOLUTION RESPIRATORY (INHALATION) at 19:11

## 2022-06-29 RX ADMIN — IPRATROPIUM BROMIDE AND ALBUTEROL SULFATE SCH ML: .5; 3 SOLUTION RESPIRATORY (INHALATION) at 15:16

## 2022-06-29 RX ADMIN — PIPERACILLIN AND TAZOBACTAM SCH MLS/HR: 3; .375 INJECTION, POWDER, FOR SOLUTION INTRAVENOUS at 00:11

## 2022-06-29 RX ADMIN — PIPERACILLIN AND TAZOBACTAM SCH MLS/HR: 3; .375 INJECTION, POWDER, FOR SOLUTION INTRAVENOUS at 09:25

## 2022-06-29 RX ADMIN — METOPROLOL TARTRATE SCH MG: 25 TABLET, FILM COATED ORAL at 09:31

## 2022-06-29 RX ADMIN — ATORVASTATIN CALCIUM SCH MG: 40 TABLET, FILM COATED ORAL at 09:29

## 2022-06-29 RX ADMIN — FUROSEMIDE SCH MG: 20 TABLET ORAL at 21:43

## 2022-06-29 RX ADMIN — ASPIRIN 81 MG CHEWABLE TABLET SCH MG: 81 TABLET CHEWABLE at 09:29

## 2022-06-29 RX ADMIN — HEPARIN SODIUM SCH MLS/HR: 10000 INJECTION, SOLUTION INTRAVENOUS at 05:15

## 2022-06-29 RX ADMIN — ISOSORBIDE MONONITRATE SCH MG: 30 TABLET, EXTENDED RELEASE ORAL at 09:29

## 2022-06-29 RX ADMIN — LOSARTAN POTASSIUM SCH MG: 25 TABLET, FILM COATED ORAL at 21:42

## 2022-06-29 RX ADMIN — TAMSULOSIN HYDROCHLORIDE SCH MG: 0.4 CAPSULE ORAL at 09:31

## 2022-06-29 RX ADMIN — PIPERACILLIN AND TAZOBACTAM SCH MLS/HR: 3; .375 INJECTION, POWDER, FOR SOLUTION INTRAVENOUS at 16:43

## 2022-06-29 RX ADMIN — FUROSEMIDE SCH MG: 20 TABLET ORAL at 09:30

## 2022-06-29 RX ADMIN — CEFAZOLIN SCH: 330 INJECTION, POWDER, FOR SOLUTION INTRAMUSCULAR; INTRAVENOUS at 11:49

## 2022-06-29 RX ADMIN — INSULIN ASPART SCH UNIT: 100 INJECTION, SOLUTION INTRAVENOUS; SUBCUTANEOUS at 20:52

## 2022-06-29 RX ADMIN — CLOPIDOGREL BISULFATE SCH MG: 75 TABLET ORAL at 09:30

## 2022-06-29 RX ADMIN — METOPROLOL TARTRATE SCH MG: 25 TABLET, FILM COATED ORAL at 21:42

## 2022-06-29 RX ADMIN — SPIRONOLACTONE SCH MG: 25 TABLET, FILM COATED ORAL at 09:29

## 2022-06-29 RX ADMIN — INSULIN ASPART SCH UNIT: 100 INJECTION, SOLUTION INTRAVENOUS; SUBCUTANEOUS at 17:05

## 2022-06-29 RX ADMIN — FAMOTIDINE SCH MG: 10 INJECTION, SOLUTION INTRAVENOUS at 09:32

## 2022-06-29 RX ADMIN — THYROID, PORCINE SCH MG: 30 TABLET ORAL at 09:32

## 2022-06-29 RX ADMIN — INSULIN DETEMIR SCH UNIT: 100 INJECTION, SOLUTION SUBCUTANEOUS at 20:52

## 2022-06-29 RX ADMIN — INSULIN ASPART SCH UNIT: 100 INJECTION, SOLUTION INTRAVENOUS; SUBCUTANEOUS at 11:49

## 2022-06-29 RX ADMIN — IPRATROPIUM BROMIDE AND ALBUTEROL SULFATE SCH ML: .5; 3 SOLUTION RESPIRATORY (INHALATION) at 10:56

## 2022-06-29 RX ADMIN — IPRATROPIUM BROMIDE AND ALBUTEROL SULFATE SCH ML: .5; 3 SOLUTION RESPIRATORY (INHALATION) at 07:18

## 2022-06-29 NOTE — P.PN
Subjective


Progress Note Date: 06/29/22


Principal diagnosis: 





CC: Shortness of breath





Patient was seen this morning.  He is currently on 5 L nasal cannula and appears

comfortable.  Patient however complaining of issues with his throat.  He is a 

poor historian and is not able to tell me what is wrong with his throat.  HUMA wiggins was cleared by speech therapy for a dysphagia 2 diet with thickened 

fluids.





Objective





- Vital Signs


Vital signs: 


                                   Vital Signs











Temp  98.2 F   06/29/22 04:00


 


Pulse  76   06/29/22 12:00


 


Resp  23   06/29/22 12:00


 


BP  115/79   06/29/22 12:00


 


Pulse Ox  94 L  06/29/22 12:00


 


FiO2  30   06/29/22 04:18








                                 Intake & Output











 06/28/22 06/29/22 06/29/22





 18:59 06:59 18:59


 


Intake Total 334.75 410 389.993


 


Output Total 837 1125 200


 


Balance -502.25 -715 189.993


 


Weight  99.4 kg 


 


Intake:   


 


   160 110


 


    Piperacillin-Tazobactam 3 175 100 100





    .375 gm In Sodium   





    Chloride 0.9% 100 ml @ 25   





    mls/hr IVPB Q8HR [facility] Rx#   





    :[id number]   


 


    Sodium Chloride 0.9% 1, 20 60 10





    000 ml @ 20 mls/hr IV .   





    Q24H [facility]ESS[facility] Rx#:[id number]   


 


  Intake, IV Titration 139.75 250 43.993





  Amount   


 


    Heparin Sod,Pork in 0.45% 139.75 250 43.993





    NaCl 25,000 unit In 0.45   





    % NaCl 1 250ml.bag @ 10.   





    02 UNITS/KG/HR 9.999 mls/   





    hr IV .Q24H [facility] Rx#:   





    [id number]   


 


  Oral   236


 


Output:   


 


  Urine 837 1125 200


 


Other:   


 


  Voiding Method Indwelling Catheter Indwelling Catheter Indwelling Catheter














- Exam





General examination - Alert  and Oriented 3 in NAD


Heart - + S1S2  no murmurs


Lungs - diminished breath sounds bilaterally, no wheezing on exam


Abdomen soft NT ND +ve BS


Extremities - trace bilateral pitting edema


CNS - Moving all 4 extremities spontaneously


Psych - Calm and cooperative, mildly confused





- Labs


CBC & Chem 7: 


                                 06/29/22 07:40





                                 06/29/22 07:26


Labs: 


                  Abnormal Lab Results - Last 24 Hours (Table)











  06/28/22 06/28/22 06/29/22 Range/Units





  16:35 20:20 06:45 


 


WBC     (3.8-10.6)  k/uL


 


RBC     (4.30-5.90)  m/uL


 


Hgb     (13.0-17.5)  gm/dL


 


Hct     (39.0-53.0)  %


 


APTT     (22.0-30.0)  sec


 


Sodium     (137-145)  mmol/L


 


BUN     (9-20)  mg/dL


 


Glucose     (74-99)  mg/dL


 


POC Glucose (mg/dL)  272 H  342 H  128 H  ()  mg/dL














  06/29/22 06/29/22 06/29/22 Range/Units





  07:26 07:26 07:40 


 


WBC    12.1 H  (3.8-10.6)  k/uL


 


RBC    3.69 L  (4.30-5.90)  m/uL


 


Hgb    11.8 L  (13.0-17.5)  gm/dL


 


Hct    34.9 L  (39.0-53.0)  %


 


APTT  105.6 H*    (22.0-30.0)  sec


 


Sodium   133 L   (137-145)  mmol/L


 


BUN   28 H   (9-20)  mg/dL


 


Glucose   131 H   (74-99)  mg/dL


 


POC Glucose (mg/dL)     ()  mg/dL














  06/29/22 Range/Units





  11:13 


 


WBC   (3.8-10.6)  k/uL


 


RBC   (4.30-5.90)  m/uL


 


Hgb   (13.0-17.5)  gm/dL


 


Hct   (39.0-53.0)  %


 


APTT   (22.0-30.0)  sec


 


Sodium   (137-145)  mmol/L


 


BUN   (9-20)  mg/dL


 


Glucose   (74-99)  mg/dL


 


POC Glucose (mg/dL)  225 H  ()  mg/dL








                      Microbiology - Last 24 Hours (Table)











 06/25/22 16:01 Blood Culture - Preliminary





 Blood    No Growth after 72 hours


 


 06/25/22 16:01 Blood Culture - Preliminary





 Blood    No Growth after 72 hours














Assessment and Plan


Assessment: 





Acute hypoxic respiratory failure secondary to aspiration acute pulmonary edema


Acute systolic heart failure with an ejection fraction 25-30%


Aspiration pneumonia


COPD


-Continue intermittent BiPAP.  Patient currently alternating between BiPAP and 5

L nasal cannula


-Resume IV Lasix 40 mg twice a day and Aldactone


-Patient on losartan and metoprolol


-Resume IV Zosyn


-Resume DuoNeb


-Cardiology and pulmonology on board


-Speech eval








Non-ST segment elevation myocardial infarction


- Resume aspirin metoprolol Plavix and statin


-Patient transition from heparin drip to subcu heparin


-Cardiology following.  May need a heart catheterization and patient is amenable





Hypervolemic Hyponatremia


-Improving on diuretics





History of CVA/TIA


-Resume aspirin and statin





Hypertension


-Resume losartan, metoprolol





Hypothyroidism


-Resume [place] Thyroid





Benign prostatic hyperplasia


-Resume Flomax





Diabetes mellitus


-Sliding-scale insulin


CODE STATUS: DNR/DNI


Anticipated discharge: Depending on hospital course

## 2022-06-29 NOTE — P.PN
Subjective


Progress Note Date: 06/29/22





PROGRESS NOTE


The patient is a 77-year-old male with a history of diabetes, hypertension and 

hyperlipidemia, prior history of stroke who presented with aspiration pneumonia,

yesterday had an acute dyspneic event with evidence of pulmonary edema.  He had 

elevation of his troponin was transferred to the ICU.  He continues to be in 

sinus mechanism, his blood pressure is stable.  He is complaining of some chest 

discomfort and dyspnea this morning.  He denies any dizziness or palpitations.  

His urinary output has been good and he continues to be on IV Lasix.  His 

echocardiogram showed a severely impaired systolic function with apical 

hypokinesis.  The patient is DO NOT RESUSCITATE.


He continues to be on aspirin, Lipitor 40 mg daily, Lasix 40 mg IV every 12 

hours him on Cozaar 25 mg daily, metoprolol tartrate 25 mg twice a day, IV 

heparin.





June 29: The patient is stable, he continues to complain of a sore throat but no

chest discomfort.  He denies any change in his breathing.  He has no dizziness, 

palpitations or syncope.  He continues to be in sinus mechanism.  He has 

evidence of ischemic cardiomyopathy echocardiography.  The patient elected not 

to proceed with any aggressive cardiac workup.  He continues to be on IV heparin

in addition to aspirin, Plavix, Lopressor 25 mg twice a day, losartan 25 mg 

daily and IV Lasix.


PHYSICAL EXAMINATION:


Blood pressure 119/60 heart rate 60


LUNGS: Mild decrease in the breath sounds at the bases


HEART: Regular rate and rhythm, S1, S2. No S3.  systolic ejection murmur


ABDOMEN: Soft, nontender, no organomegaly


EXTREMETIES: No edema





LAB:


Hemoglobin 11.8, BUN 28, creatinine 1.02


IMPRESSION:


1.  Acute pulmonary edema with non-STEMI and severe cardiomyopathy of unknown 

duration or etiology, could be a combination of ischemic and nonischemic.


2.  Aspiration pneumonia


3.  History of diabetes


4.  History of stroke


5.  History of hypertension





PLAN:


1.  Stop IV heparin


2.  Change to oral nitrate


3.  Increase physical activity


4.  Follow blood pressure and adjust beta blocker as tolerated.


5.  Prognosis remains guarded.


 








Objective





- Vital Signs


Vital signs: 


                                   Vital Signs











Temp  98.2 F   06/29/22 04:00


 


Pulse  67   06/29/22 07:30


 


Resp  20   06/29/22 07:00


 


BP  119/61   06/29/22 07:00


 


Pulse Ox  99   06/29/22 07:18


 


FiO2  30   06/29/22 04:18








                                 Intake & Output











 06/28/22 06/29/22 06/29/22





 18:59 06:59 18:59


 


Intake Total 334.75 410 10


 


Output Total 837 1125 65


 


Balance -502.25 -715 -55


 


Weight  99.4 kg 


 


Intake:   


 


   160 10


 


    Piperacillin-Tazobactam 3 175 100 





    .375 gm In Sodium   





    Chloride 0.9% 100 ml @ 25   





    mls/hr IVPB Q8HR [facility] Rx#   





    :[id number]   


 


    Sodium Chloride 0.9% 1, 20 60 10





    000 ml @ 20 mls/hr IV .   





    Q24H [facility]ESS[facility] Rx#:[id number]   


 


  Intake, IV Titration 139.75 250 





  Amount   


 


    Heparin Sod,Pork in 0.45% 139.75 250 





    NaCl 25,000 unit In 0.45   





    % NaCl 1 250ml.bag @ 10.   





    02 UNITS/KG/HR 9.999 mls/   





    hr IV .Q24H [facility]ESS[facility] Rx#:   





    [id number]   


 


Output:   


 


  Urine 837 1125 65


 


Other:   


 


  Voiding Method Indwelling Catheter Indwelling Catheter 














- Labs


CBC & Chem 7: 


                                 06/29/22 07:40





                                 06/29/22 07:26


Labs: 


                  Abnormal Lab Results - Last 24 Hours (Table)











  06/28/22 06/28/22 06/28/22 Range/Units





  11:29 16:35 20:20 


 


WBC     (3.8-10.6)  k/uL


 


RBC     (4.30-5.90)  m/uL


 


Hgb     (13.0-17.5)  gm/dL


 


Hct     (39.0-53.0)  %


 


Sodium     (137-145)  mmol/L


 


BUN     (9-20)  mg/dL


 


Glucose     (74-99)  mg/dL


 


POC Glucose (mg/dL)  274 H  272 H  342 H  ()  mg/dL














  06/29/22 06/29/22 06/29/22 Range/Units





  06:45 07:26 07:40 


 


WBC    12.1 H  (3.8-10.6)  k/uL


 


RBC    3.69 L  (4.30-5.90)  m/uL


 


Hgb    11.8 L  (13.0-17.5)  gm/dL


 


Hct    34.9 L  (39.0-53.0)  %


 


Sodium   133 L   (137-145)  mmol/L


 


BUN   28 H   (9-20)  mg/dL


 


Glucose   131 H   (74-99)  mg/dL


 


POC Glucose (mg/dL)  128 H    ()  mg/dL








                      Microbiology - Last 24 Hours (Table)











 06/25/22 16:01 Blood Culture - Preliminary





 Blood    No Growth after 72 hours


 


 06/25/22 16:01 Blood Culture - Preliminary





 Blood    No Growth after 72 hours

## 2022-06-29 NOTE — XR
EXAMINATION TYPE: XR chest 1V portable

 

DATE OF EXAM: 6/29/2022

 

COMPARISON: 6/28/2022

 

HISTORY: Shortness of breath

 

TECHNIQUE: Single frontal view of the chest is obtained.

 

FINDINGS:  There are bilateral pleural effusions with cardiomegaly and bibasilar infiltrate. There is
 a diffuse interstitial pattern. Atherosclerotic change aorta. No pneumothorax. Diffuse osteopenia wi
th arthropathy of the shoulders. 

 

 

IMPRESSION:  Diffuse pleural-parenchymal changes correlate for CHF versus diffuse pneumonia. Findings
 stable.

## 2022-06-29 NOTE — P.PN
Subjective


Progress Note Date: 06/29/22








This is 77-year-old male patient from a [facility] care facility who has a history

of diabetes mellitus, CVA/TIA, GERD, hyperlipidemia, hypertension, 

hypothyroidism.  He was brought into the emergency room yesterday by EMS after 

choking while eating a sloppy Yusuf and french fries.  He was quite hypoxic at the

[facility] and when paramedics arrived patient's O2 saturation was in the 60s.  He is 

seen today in the intensive care unit.  He is sitting up.  Awake and alert.  

Somewhat slow to respond.  Oriented 2.  He is currently on 40% Ventimask.  No 

IV fluids.  He was initiated on Zosyn.  Chest x-ray reveals some bilateral 

reticular nodular infiltrates right greater than left.  White count 16.1.  

Hemoglobin 12.0.  Platelets 242.  Sodium 125.  Potassium 4.9.  Bicarb 20.  BUN 

18.  Creatinine 0.7.  Lactic acid 2.0.  AST 84.  ALT 23.  Glucose 242.








06/27/2022, I'm seeing this patient on a medical floor.  I was asked by the 

nursing staff to evaluate him as soon as possible as the patient developed acute

shortness of breath.  At the time of my arrival, the patient was in significant 

respiratory distress.  He was tachycardic, tachypneic, he had a congested cough,

diaphoretic, and was having labored breathing.  No further bouts of aspiration 

per the nursing staff.  The patient was admitted yesterday to the hospital 

because of an aspiration pneumonia started on IV Zosyn.  At that point, a workup

was initiated.  Chest x-ray was ordered that showed lower lobe pulmonary 

infiltrates and pulmonary vessel congestion.  The patient was given a dose of 

Lasix 40 mg IV.  The patient was given IV Solu-Medrol.  The patient was started 

on bronchodilators.  Following that, I started the patient on BiPAP at a pre

ssure of 10/5 cm of water with an FiO2 of 100% and the patient a chance to the 

intensive care unit with understanding that he has a DNR/DNI CODE STATUS.  The 

patient was later on evaluated in the ICU and the patient was already feeling 

better and diuresing.  EKG showed sinus tachycardia.  Troponin came back at 6 

and the patient suffered an acute non-ST segment elevation myocardial 

infarctions the patient will be seen by cardiology.  The white cell count of 

15.0 with hemoglobin 12.1.  The patient's sodium is up to 129, BUN is at 22 with

a creatinine of 0.9 and the potassium level of 4.6.  The proBNP level was 6690.





06/28/2022, the patient is in the intensive care unit.  Events from yesterday 

was noted and the patient obviously went into an acute pulmonary edema on top of

an aspiration pneumonia.  The patient was brought to the intensive care unit.  

The patient was supported by BiPAP.  The patient was diuresed.  Further 

investigation revealed that the patient suffered an acute non-ST segment 

elevation myocardial infarction.  Troponin peaked at 15.  Cardiac rhythm remains

sinus.  The patient got diuresed with IV Lasix.  Produced excellent urine output

and ultimately his breathing improved and the patient was taken off the BiPAP.  

This morning, the patient is on oxygen at 5 L nasal cannula.  The chest x-ray 

showing bilateral lower lobe pulmonary infiltrates in addition to pulmonary 

edema.  The patient remains on IV Zosyn.  Breathing remains slightly labored 

although is more comfortable.  His respiratory rate currently is in the mid 20s.

 The troponin trend as mentioned was up to 13.6 this morning.  His initial 

troponin was at 6.0.  Echocardiogram was repeated and the patient was found to 

have marked impairment of LV function.  The patient's ejection fraction was down

to 25-30%.  This was a suboptimal study due to the poor echo window.  

Nevertheless, LV function was severely diminished with an ejection fraction of 

25-30% and the apex was hypokinetic.  No other valvular abnormalities noted.  

Aortic valve was within normal limits.  Tricuspid valve and the mitral valves 

were also within normal limits.  On today's evaluation, the patient has a white 

cell count of 14.6 with a hemoglobin of 11.2.  Normal coagulation profile noted 

the patient is on IV heparin and the PTT currently it is therapeutic at 55.6.  

Blood sugar is at 163.  The rest of the electrolytes are still pending for now. 

Meanwhile, his fluid balance has been -2.4 L over the past 24 hours.  The patien

t remains on IV Zosyn.  The patient remains on IV Lasix 40 mg every 12 hours.  

The patient remains on IV heparin.  The patient is on Levemir insulin 10 units 

in addition to a sliding scale coverage.  Rest of the home medications of been 

ordered resume.  He is on aspirin.  He is on metoprolol 25 mg by mouth twice a 

day.  He is on no pressors for now.  Kennedy catheter is in place.  

Neurologically, he does communicate yet his communication is limited.  He is 

alert and oriented 2.  He is moving all 4 extremities.





29th 2022, neurologically the patient is unchanged.  Is able to answer questions

and follows simple commands.  He does have underlying dementia.  He also has 

underlying CVA along with various comorbidities.  The patient got transferred to

the intensive care unit after the patient developed acute hypoxic respiratory 

failure, CHF and bilateral pneumonias.  He also states suffered an acute ST 

segment elevation myocardial infarction.  Currently is on IV Zosyn.  Swallow 

evaluation is still in progress and this will be completed today.  Meanwhile, 

the patient is taking pured diet.  His chest x-ray from today is showing 

bilateral perihilar pulmonary infiltrates and lower lobe pleural effusions and 

consolidations.  This is a combination of CHF and aspiration pneumonia.  As 

mentioned, the patient remains on IV Zosyn.  The patient is also on IV Lasix 40 

mg every 12 hours.  Overall fluid balance over the past 24 hours has been -2.4 L

followed by a -1.2 L.  For now, the patient is on 5 L of oxygen by nasal 

cannula.  The white cells of 12.1 with a hemoglobin of 11.8.  The patient's BUN 

is at 28 with a creatinine of 1.02 and the sodium level is at 133.  The patient 

was on IV heparin for yesterday and the IV heparin was discontinued this 

morning.  Hemodynamically stable.  He remains on aspirin and Plavix.  He is also

on metoprolol 25 mg by mouth twice a day in addition to Aldactone.  Home 

medications of been all resume.  Kenneyd cath is in place.  He does use the BiPAP 

overnight and currently is off the BiPAP and back on oxygen by nasal cannula.  

His echocardiogram revealed severe cardiomyopathy with an ejection fraction of 

25-30%.  I discussed the case with cardiology.  Based on his comorbidities and 

DNR/DNI CODE STATUS, we are going to treat his acute  STEMI medically.





Objective





- Vital Signs


Vital signs: 


                                   Vital Signs











Temp  98.2 F   06/29/22 04:00


 


Pulse  67   06/29/22 07:30


 


Resp  20   06/29/22 07:00


 


BP  119/61   06/29/22 07:00


 


Pulse Ox  99   06/29/22 07:18


 


FiO2  30   06/29/22 04:18








                                 Intake & Output











 06/28/22 06/29/22 06/29/22





 18:59 06:59 18:59


 


Intake Total 334.75 410 53.993


 


Output Total 837 1125 65


 


Balance -502.25 -715 -11.007


 


Weight  99.4 kg 


 


Intake:   


 


   160 10


 


    Piperacillin-Tazobactam 3 175 100 





    .375 gm In Sodium   





    Chloride 0.9% 100 ml @ 25   





    mls/hr IVPB Q8HR JESSICA Rx#   





    :[id number]   


 


    Sodium Chloride 0.9% 1, 20 60 10





    000 ml @ 20 mls/hr IV .   





    Q24H JESSICA Rx#:[id number]   


 


  Intake, IV Titration 139.75 250 43.993





  Amount   


 


    Heparin Sod,Pork in 0.45% 139.75 250 43.993





    NaCl 25,000 unit In 0.45   





    % NaCl 1 250ml.bag @ 10.   





    02 UNITS/KG/HR 9.999 mls/   





    hr IV .Q24H JESSICA Rx#:   





    [id number]   


 


Output:   


 


  Urine 837 1125 65


 


Other:   


 


  Voiding Method Indwelling Catheter Indwelling Catheter 














- Exam








GENERAL EXAM: Alert, oriented 2, 77-year-old male patient, and breathing is 

less labored and the patient is currently on 5 L of O2 nasal cannula


HEAD: Normocephalic.


EYES: Normal reaction of pupils, equal size.


NOSE: Clear with pink turbinates.


THROAT: No erythema or exudates.


NECK: No masses, no JVD.


CHEST: No chest wall deformity.


LUNGS: Equal air entry with bilateral scattered rhonchi.  Crackles in lung bases

are still present


CVS: S1 and S2 normal with no audible murmur, regular rhythm.


ABDOMEN: No hepatosplenomegaly, normal bowel sounds, no guarding or rigidity.


SPINE: No scoliosis or deformity


SKIN: No rashes


CENTRAL NERVOUS SYSTEM: No focal deficits, tone is normal in all 4 extremities.


EXTREMITIES: There is no peripheral edema.  No clubbing, no cyanosis.  

Peripheral pulses are intact.








- Labs


CBC & Chem 7: 


                                 06/29/22 07:40





                                 06/29/22 07:26


Labs: 


                  Abnormal Lab Results - Last 24 Hours (Table)











  06/28/22 06/28/22 06/28/22 Range/Units





  11:29 16:35 20:20 


 


WBC     (3.8-10.6)  k/uL


 


RBC     (4.30-5.90)  m/uL


 


Hgb     (13.0-17.5)  gm/dL


 


Hct     (39.0-53.0)  %


 


APTT     (22.0-30.0)  sec


 


Sodium     (137-145)  mmol/L


 


BUN     (9-20)  mg/dL


 


Glucose     (74-99)  mg/dL


 


POC Glucose (mg/dL)  274 H  272 H  342 H  ()  mg/dL














  06/29/22 06/29/22 06/29/22 Range/Units





  06:45 07:26 07:26 


 


WBC     (3.8-10.6)  k/uL


 


RBC     (4.30-5.90)  m/uL


 


Hgb     (13.0-17.5)  gm/dL


 


Hct     (39.0-53.0)  %


 


APTT   105.6 H*   (22.0-30.0)  sec


 


Sodium    133 L  (137-145)  mmol/L


 


BUN    28 H  (9-20)  mg/dL


 


Glucose    131 H  (74-99)  mg/dL


 


POC Glucose (mg/dL)  128 H    ()  mg/dL














  06/29/22 Range/Units





  07:40 


 


WBC  12.1 H  (3.8-10.6)  k/uL


 


RBC  3.69 L  (4.30-5.90)  m/uL


 


Hgb  11.8 L  (13.0-17.5)  gm/dL


 


Hct  34.9 L  (39.0-53.0)  %


 


APTT   (22.0-30.0)  sec


 


Sodium   (137-145)  mmol/L


 


BUN   (9-20)  mg/dL


 


Glucose   (74-99)  mg/dL


 


POC Glucose (mg/dL)   ()  mg/dL








                      Microbiology - Last 24 Hours (Table)











 06/25/22 16:01 Blood Culture - Preliminary





 Blood    No Growth after 72 hours


 


 06/25/22 16:01 Blood Culture - Preliminary





 Blood    No Growth after 72 hours














Assessment and Plan


Plan: 








Acute hypoxemic respiratory failure secondary to aspiration and acute pulmonary 

edema.  The patient was in severe respiratory distress when he arrived to the 

intensive care unit.  Supported by the BiPAP.  The patient was diuresed with IV 

Lasix.  The patient's was also covered with IV Zosyn.  He did suffer an acute 

non-ST segment elevation myocardial infarction.  Currently off BiPAP and the 

patient is currently on 5 L of oxygen by nasal cannula.  The patient continues 

to diurese.  Overall condition essentially the same as yesterday and he remains 

on 5 L of O2 nasal cannula and his current pulse ox is 98% and that should be 

able to wean down further.  Chest x-ray findings remain essentially unchanged.





Acute aspiration on Zosyn





Acute pulmonary edema with acute systolic heart failure and an ejection fraction

of 25-30%





Acute non-ST segment elevation myocardial infarction and initial troponin was at

  13





Hyponatremia, recovered





History of CVA/TIA





Hyperlipidemia





Hypertension





Hypothyroidism





Benign prostatic hyperplasia





Diabetes mellitus





Nursing home resident





Altered mentation patient remains alert and oriented 2 with limited amount of 

communication skills, neurologically unchanged





Dysphagia with poor swallowing skills probably related to comorbidities and a 

previous stroke.  Swallow evaluation  needs to be done today





Plan





DNR/DNI CODE STATUS


Continue BiPAP therapy for respiratory support, on and off.  The patient is 

currently off the BiPAP on 5 L oxygen by nasal cannula and his seems to be quite

comfortable.


DC IV heparin was switched this patient to subcu heparin 5000 units every 8 

hours


Wean down the FiO2 to maintain a saturation above 90%


Continue Lasix 40 mg IV every 12 hours


Kennedy catheter in place and monitor the urine output


Continue IV Zosyn


Follow-up chest x-ray in a.m.


Obtain a official swallow evaluation by speech pathology to evaluate his swallow

and his risk of aspiration.


Condition is obviously critical baseline above-mentioned comorbidities.  We'll 

continue to follow. 


Continue aspirin


Continue metoprolol


Continue statins


Dulcolax suppository to facilitate bowel movement activity


Home medications have been resumed and the patient be kept in ICU for another 24

hours.

## 2022-06-30 LAB
ANION GAP SERPL CALC-SCNC: 5 MMOL/L
BASOPHILS # BLD AUTO: 0 K/UL (ref 0–0.2)
BASOPHILS NFR BLD AUTO: 0 %
BUN SERPL-SCNC: 23 MG/DL (ref 9–20)
CALCIUM SPEC-MCNC: 8.3 MG/DL (ref 8.4–10.2)
CHLORIDE SERPL-SCNC: 101 MMOL/L (ref 98–107)
CO2 SERPL-SCNC: 28 MMOL/L (ref 22–30)
EOSINOPHIL # BLD AUTO: 0.4 K/UL (ref 0–0.7)
EOSINOPHIL NFR BLD AUTO: 5 %
ERYTHROCYTE [DISTWIDTH] IN BLOOD BY AUTOMATED COUNT: 3.45 M/UL (ref 4.3–5.9)
ERYTHROCYTE [DISTWIDTH] IN BLOOD: 14 % (ref 11.5–15.5)
GLUCOSE BLD-MCNC: 158 MG/DL (ref 70–110)
GLUCOSE BLD-MCNC: 168 MG/DL (ref 70–110)
GLUCOSE BLD-MCNC: 185 MG/DL (ref 70–110)
GLUCOSE BLD-MCNC: 238 MG/DL (ref 70–110)
GLUCOSE BLD-MCNC: 257 MG/DL (ref 70–110)
GLUCOSE SERPL-MCNC: 132 MG/DL (ref 74–99)
HCT VFR BLD AUTO: 32.8 % (ref 39–53)
HGB BLD-MCNC: 10.8 GM/DL (ref 13–17.5)
LYMPHOCYTES # SPEC AUTO: 2.3 K/UL (ref 1–4.8)
LYMPHOCYTES NFR SPEC AUTO: 28 %
MCH RBC QN AUTO: 31.3 PG (ref 25–35)
MCHC RBC AUTO-ENTMCNC: 33 G/DL (ref 31–37)
MCV RBC AUTO: 95 FL (ref 80–100)
MONOCYTES # BLD AUTO: 0.8 K/UL (ref 0–1)
MONOCYTES NFR BLD AUTO: 9 %
NEUTROPHILS # BLD AUTO: 4.8 K/UL (ref 1.3–7.7)
NEUTROPHILS NFR BLD AUTO: 57 %
PLATELET # BLD AUTO: 253 K/UL (ref 150–450)
POTASSIUM SERPL-SCNC: 4.3 MMOL/L (ref 3.5–5.1)
SODIUM SERPL-SCNC: 134 MMOL/L (ref 137–145)
WBC # BLD AUTO: 8.5 K/UL (ref 3.8–10.6)

## 2022-06-30 PROCEDURE — 5A09357 ASSISTANCE WITH RESPIRATORY VENTILATION, LESS THAN 24 CONSECUTIVE HOURS, CONTINUOUS POSITIVE AIRWAY PRESSURE: ICD-10-PCS

## 2022-06-30 RX ADMIN — CLOPIDOGREL BISULFATE SCH MG: 75 TABLET ORAL at 09:18

## 2022-06-30 RX ADMIN — TAMSULOSIN HYDROCHLORIDE SCH MG: 0.4 CAPSULE ORAL at 09:20

## 2022-06-30 RX ADMIN — CEFAZOLIN SCH MLS/HR: 330 INJECTION, POWDER, FOR SOLUTION INTRAMUSCULAR; INTRAVENOUS at 14:19

## 2022-06-30 RX ADMIN — IPRATROPIUM BROMIDE AND ALBUTEROL SULFATE SCH ML: .5; 3 SOLUTION RESPIRATORY (INHALATION) at 20:07

## 2022-06-30 RX ADMIN — INSULIN ASPART SCH UNIT: 100 INJECTION, SOLUTION INTRAVENOUS; SUBCUTANEOUS at 06:39

## 2022-06-30 RX ADMIN — IPRATROPIUM BROMIDE AND ALBUTEROL SULFATE SCH ML: .5; 3 SOLUTION RESPIRATORY (INHALATION) at 15:46

## 2022-06-30 RX ADMIN — INSULIN ASPART SCH UNIT: 100 INJECTION, SOLUTION INTRAVENOUS; SUBCUTANEOUS at 16:57

## 2022-06-30 RX ADMIN — INSULIN ASPART SCH UNIT: 100 INJECTION, SOLUTION INTRAVENOUS; SUBCUTANEOUS at 20:57

## 2022-06-30 RX ADMIN — ISOSORBIDE MONONITRATE SCH MG: 30 TABLET, EXTENDED RELEASE ORAL at 09:19

## 2022-06-30 RX ADMIN — METOPROLOL TARTRATE SCH MG: 25 TABLET, FILM COATED ORAL at 20:58

## 2022-06-30 RX ADMIN — PIPERACILLIN AND TAZOBACTAM SCH MLS/HR: 3; .375 INJECTION, POWDER, FOR SOLUTION INTRAVENOUS at 16:57

## 2022-06-30 RX ADMIN — IPRATROPIUM BROMIDE AND ALBUTEROL SULFATE SCH ML: .5; 3 SOLUTION RESPIRATORY (INHALATION) at 07:18

## 2022-06-30 RX ADMIN — PIPERACILLIN AND TAZOBACTAM SCH MLS/HR: 3; .375 INJECTION, POWDER, FOR SOLUTION INTRAVENOUS at 00:52

## 2022-06-30 RX ADMIN — FUROSEMIDE SCH MG: 20 TABLET ORAL at 20:58

## 2022-06-30 RX ADMIN — FAMOTIDINE SCH MG: 10 INJECTION, SOLUTION INTRAVENOUS at 09:19

## 2022-06-30 RX ADMIN — SPIRONOLACTONE SCH MG: 25 TABLET, FILM COATED ORAL at 09:20

## 2022-06-30 RX ADMIN — FUROSEMIDE SCH MG: 20 TABLET ORAL at 09:19

## 2022-06-30 RX ADMIN — THYROID, PORCINE SCH MG: 30 TABLET ORAL at 09:20

## 2022-06-30 RX ADMIN — IPRATROPIUM BROMIDE AND ALBUTEROL SULFATE SCH: .5; 3 SOLUTION RESPIRATORY (INHALATION) at 11:21

## 2022-06-30 RX ADMIN — PIPERACILLIN AND TAZOBACTAM SCH MLS/HR: 3; .375 INJECTION, POWDER, FOR SOLUTION INTRAVENOUS at 09:17

## 2022-06-30 RX ADMIN — HEPARIN SODIUM SCH UNIT: 5000 INJECTION INTRAVENOUS; SUBCUTANEOUS at 20:58

## 2022-06-30 RX ADMIN — LOSARTAN POTASSIUM SCH MG: 25 TABLET, FILM COATED ORAL at 20:58

## 2022-06-30 RX ADMIN — ATORVASTATIN CALCIUM SCH MG: 40 TABLET, FILM COATED ORAL at 09:18

## 2022-06-30 RX ADMIN — INSULIN ASPART SCH UNIT: 100 INJECTION, SOLUTION INTRAVENOUS; SUBCUTANEOUS at 14:19

## 2022-06-30 RX ADMIN — METOPROLOL TARTRATE SCH MG: 25 TABLET, FILM COATED ORAL at 09:20

## 2022-06-30 RX ADMIN — INSULIN DETEMIR SCH UNIT: 100 INJECTION, SOLUTION SUBCUTANEOUS at 20:59

## 2022-06-30 RX ADMIN — HEPARIN SODIUM SCH UNIT: 5000 INJECTION INTRAVENOUS; SUBCUTANEOUS at 09:19

## 2022-06-30 RX ADMIN — ASPIRIN 81 MG CHEWABLE TABLET SCH MG: 81 TABLET CHEWABLE at 09:18

## 2022-06-30 NOTE — XR
EXAMINATION TYPE: XR chest 1V portable

 

DATE OF EXAM: 6/30/2022

 

COMPARISON: 622

 

HISTORY: Shortness of breath

 

TECHNIQUE: Single frontal view of the chest is obtained.

 

FINDINGS:  There are bilateral pleural effusions with cardiomegaly and bibasilar infiltrate. There is
 a diffuse interstitial pattern. Atherosclerotic change aorta. No pneumothorax. Diffuse osteopenia wi
th arthropathy of the shoulders. 

 

 

IMPRESSION:  Diffuse pleural-parenchymal changes correlate for CHF versus diffuse pneumonia. Findings
 stable.

## 2022-06-30 NOTE — FL
Modified barium swallow.

 

HISTORY: Dysphagia.

 

Modified barium swallow was performed with the department of speech pathology.  The patient was prese
nted with various consistencies of barium. 

 

There is no evidence for aspiration or penetration.  Full report is to follow from the department of 
speech pathology.

 

Impression:  Normal study.

## 2022-06-30 NOTE — P.PN
Subjective


Progress Note Date: 06/30/22


Principal diagnosis: 





CC: Shortness of breath





Patient is opposed as likely due to old age.  He denies any acute complaints.  I

discussed the case with the ICU nurse.  She says that patient was able to 

tolerate his dysphagia to nectar thick diet.  Patient currently on 3 L nasal ca

nnula and is satting well.  Patient is stable to transfer out of the ICU.





Objective





- Vital Signs


Vital signs: 


                                   Vital Signs











Temp  98.7 F   06/30/22 12:00


 


Pulse  82   06/30/22 12:00


 


Resp  22   06/30/22 12:00


 


BP  125/73   06/30/22 12:00


 


Pulse Ox  96   06/30/22 12:00


 


FiO2  30   06/30/22 03:50








                                 Intake & Output











 06/29/22 06/30/22 06/30/22





 18:59 06:59 18:59


 


Intake Total 1044.993 210 360


 


Output Total 674 400 0


 


Balance 370.993 -190 360


 


Weight  101 kg 101 kg


 


Intake:   


 


   210 60


 


    Piperacillin-Tazobactam 3 125 100 





    .375 gm In Sodium   





    Chloride 0.9% 100 ml @ 25   





    mls/hr IVPB Q8HR [facility]ESSICA Rx#   





    :[id number]   


 


    Sodium Chloride 0.9% 1, 60 110 60





    000 ml @ 20 mls/hr IV .   





    Q24H JESSICA Rx#:[id number]   


 


  Intake, IV Titration 43.993  





  Amount   


 


    Heparin Sod,Pork in 0.45% 43.993  





    NaCl 25,000 unit In 0.45   





    % NaCl 1 250ml.bag @ 10.   





    02 UNITS/KG/HR 9.999 mls/   





    hr IV .Q24H JESSICA Rx#:   





    [id number]   


 


  Oral 816  300


 


Output:   


 


  Urine 674 400 0


 


Other:   


 


  Voiding Method Indwelling Catheter Indwelling Catheter Urinal


 


  # Voids  0 1














- Exam





General examination - Alert  and Oriented 3 in NAD


Heart - + S1S2  no murmurs


Lungs - diminished breath sounds bilaterally, no wheezing on exam


Abdomen soft NT ND +ve BS


Extremities - trace bilateral pitting edema


CNS - Moving all 4 extremities spontaneously


Psych - Calm and cooperative, mildly confused





- Labs


CBC & Chem 7: 


                                 06/30/22 05:40





                                 06/30/22 05:40


Labs: 


                  Abnormal Lab Results - Last 24 Hours (Table)











  06/29/22 06/29/22 06/30/22 Range/Units





  15:58 20:23 05:40 


 


RBC     (4.30-5.90)  m/uL


 


Hgb     (13.0-17.5)  gm/dL


 


Hct     (39.0-53.0)  %


 


Sodium    134 L  (137-145)  mmol/L


 


BUN    23 H  (9-20)  mg/dL


 


Glucose    132 H  (74-99)  mg/dL


 


POC Glucose (mg/dL)  325 H  262 H   ()  mg/dL


 


Calcium    8.3 L  (8.4-10.2)  mg/dL














  06/30/22 06/30/22 06/30/22 Range/Units





  05:40 06:35 11:21 


 


RBC  3.45 L    (4.30-5.90)  m/uL


 


Hgb  10.8 L    (13.0-17.5)  gm/dL


 


Hct  32.8 L    (39.0-53.0)  %


 


Sodium     (137-145)  mmol/L


 


BUN     (9-20)  mg/dL


 


Glucose     (74-99)  mg/dL


 


POC Glucose (mg/dL)   168 H  158 H  ()  mg/dL


 


Calcium     (8.4-10.2)  mg/dL








                      Microbiology - Last 24 Hours (Table)











 06/25/22 16:01 Blood Culture - Preliminary





 Blood    No Growth after 96 hours


 


 06/25/22 16:01 Blood Culture - Preliminary





 Blood    No Growth after 96 hours














Assessment and Plan


Assessment: 





Acute hypoxic respiratory failure secondary to aspiration acute pulmonary edema


Acute systolic heart failure with an ejection fraction 25-30%


Aspiration pneumonia


COPD


-Continue intermittent BiPAP.  Patient currently alternating between BiPAP and 3

L nasal cannula


-Resume IV Lasix 40 mg twice a day and Aldactone


-Patient on losartan and metoprolol


-Resume IV Zosyn


-Resume DuoNeb


-Cardiology and pulmonology on board


-Speech eval -> clear patient for dysphagia 2 diet with nectar thick liquid.  

Patient scheduled for barium swallow study today








Non-ST segment elevation myocardial infarction


- Resume aspirin metoprolol Plavix and statin


-Patient transition from heparin drip to subcu heparin


-Cardiology following.  May need a heart catheterization if patient is amenable





Hypervolemic Hyponatremia


-Improving on diuretics





History of CVA/TIA


-Resume aspirin and statin





Hypertension


-Resume losartan, metoprolol





Hypothyroidism


-Resume [place] Thyroid





Benign prostatic hyperplasia


-Resume Flomax





Consult PT OT





Diabetes mellitus


-Sliding-scale insulin


CODE STATUS: DNR/DNI


Anticipated discharge: Likely Tuesday 07/05/2022 after the holidays

## 2022-06-30 NOTE — P.PN
Subjective


Progress Note Date: 06/30/22








This is 77-year-old male patient from a [facility] care facility who has a history

of diabetes mellitus, CVA/TIA, GERD, hyperlipidemia, hypertension, 

hypothyroidism.  He was brought into the emergency room yesterday by EMS after 

choking while eating a sloppy Yusuf and french fries.  He was quite hypoxic at the

[facility] and when paramedics arrived patient's O2 saturation was in the 60s.  He is 

seen today in the intensive care unit.  He is sitting up.  Awake and alert.  

Somewhat slow to respond.  Oriented 2.  He is currently on 40% Ventimask.  No 

IV fluids.  He was initiated on Zosyn.  Chest x-ray reveals some bilateral 

reticular nodular infiltrates right greater than left.  White count 16.1.  

Hemoglobin 12.0.  Platelets 242.  Sodium 125.  Potassium 4.9.  Bicarb 20.  BUN 

18.  Creatinine 0.7.  Lactic acid 2.0.  AST 84.  ALT 23.  Glucose 242.








06/27/2022, I'm seeing this patient on a medical floor.  I was asked by the 

nursing staff to evaluate him as soon as possible as the patient developed acute

shortness of breath.  At the time of my arrival, the patient was in significant 

respiratory distress.  He was tachycardic, tachypneic, he had a congested cough,

diaphoretic, and was having labored breathing.  No further bouts of aspiration 

per the nursing staff.  The patient was admitted yesterday to the hospital 

because of an aspiration pneumonia started on IV Zosyn.  At that point, a workup

was initiated.  Chest x-ray was ordered that showed lower lobe pulmonary 

infiltrates and pulmonary vessel congestion.  The patient was given a dose of 

Lasix 40 mg IV.  The patient was given IV Solu-Medrol.  The patient was started 

on bronchodilators.  Following that, I started the patient on BiPAP at a pre

ssure of 10/5 cm of water with an FiO2 of 100% and the patient a chance to the 

intensive care unit with understanding that he has a DNR/DNI CODE STATUS.  The 

patient was later on evaluated in the ICU and the patient was already feeling 

better and diuresing.  EKG showed sinus tachycardia.  Troponin came back at 6 

and the patient suffered an acute non-ST segment elevation myocardial 

infarctions the patient will be seen by cardiology.  The white cell count of 

15.0 with hemoglobin 12.1.  The patient's sodium is up to 129, BUN is at 22 with

a creatinine of 0.9 and the potassium level of 4.6.  The proBNP level was 6690.





06/28/2022, the patient is in the intensive care unit.  Events from yesterday 

was noted and the patient obviously went into an acute pulmonary edema on top of

an aspiration pneumonia.  The patient was brought to the intensive care unit.  

The patient was supported by BiPAP.  The patient was diuresed.  Further 

investigation revealed that the patient suffered an acute non-ST segment 

elevation myocardial infarction.  Troponin peaked at 15.  Cardiac rhythm remains

sinus.  The patient got diuresed with IV Lasix.  Produced excellent urine output

and ultimately his breathing improved and the patient was taken off the BiPAP.  

This morning, the patient is on oxygen at 5 L nasal cannula.  The chest x-ray 

showing bilateral lower lobe pulmonary infiltrates in addition to pulmonary 

edema.  The patient remains on IV Zosyn.  Breathing remains slightly labored 

although is more comfortable.  His respiratory rate currently is in the mid 20s.

 The troponin trend as mentioned was up to 13.6 this morning.  His initial 

troponin was at 6.0.  Echocardiogram was repeated and the patient was found to 

have marked impairment of LV function.  The patient's ejection fraction was down

to 25-30%.  This was a suboptimal study due to the poor echo window.  

Nevertheless, LV function was severely diminished with an ejection fraction of 

25-30% and the apex was hypokinetic.  No other valvular abnormalities noted.  

Aortic valve was within normal limits.  Tricuspid valve and the mitral valves 

were also within normal limits.  On today's evaluation, the patient has a white 

cell count of 14.6 with a hemoglobin of 11.2.  Normal coagulation profile noted 

the patient is on IV heparin and the PTT currently it is therapeutic at 55.6.  

Blood sugar is at 163.  The rest of the electrolytes are still pending for now. 

Meanwhile, his fluid balance has been -2.4 L over the past 24 hours.  The patien

t remains on IV Zosyn.  The patient remains on IV Lasix 40 mg every 12 hours.  

The patient remains on IV heparin.  The patient is on Levemir insulin 10 units 

in addition to a sliding scale coverage.  Rest of the home medications of been 

ordered resume.  He is on aspirin.  He is on metoprolol 25 mg by mouth twice a 

day.  He is on no pressors for now.  Kennedy catheter is in place.  

Neurologically, he does communicate yet his communication is limited.  He is 

alert and oriented 2.  He is moving all 4 extremities.





29th 2022, neurologically the patient is unchanged.  Is able to answer questions

and follows simple commands.  He does have underlying dementia.  He also has 

underlying CVA along with various comorbidities.  The patient got transferred to

the intensive care unit after the patient developed acute hypoxic respiratory 

failure, CHF and bilateral pneumonias.  He also states suffered an acute ST 

segment elevation myocardial infarction.  Currently is on IV Zosyn.  Swallow 

evaluation is still in progress and this will be completed today.  Meanwhile, 

the patient is taking pured diet.  His chest x-ray from today is showing 

bilateral perihilar pulmonary infiltrates and lower lobe pleural effusions and 

consolidations.  This is a combination of CHF and aspiration pneumonia.  As 

mentioned, the patient remains on IV Zosyn.  The patient is also on IV Lasix 40 

mg every 12 hours.  Overall fluid balance over the past 24 hours has been -2.4 L

followed by a -1.2 L.  For now, the patient is on 5 L of oxygen by nasal 

cannula.  The white cells of 12.1 with a hemoglobin of 11.8.  The patient's BUN 

is at 28 with a creatinine of 1.02 and the sodium level is at 133.  The patient 

was on IV heparin for yesterday and the IV heparin was discontinued this 

morning.  Hemodynamically stable.  He remains on aspirin and Plavix.  He is also

on metoprolol 25 mg by mouth twice a day in addition to Aldactone.  Home 

medications of been all resume.  Kennedy cath is in place.  He does use the BiPAP 

overnight and currently is off the BiPAP and back on oxygen by nasal cannula.  

His echocardiogram revealed severe cardiomyopathy with an ejection fraction of 

25-30%.  I discussed the case with cardiology.  Based on his comorbidities and 

DNR/DNI CODE STATUS, we are going to treat his acute  STEMI medically.





06/30/2022, the patient is doing well.  The patient spent the night on a BiPAP 

at a pressure of 14/6 cm of water with an FiO2 of 30% and the patient is 

currently on 3 L.  Swallow evaluation was done and the patient was found to have

dysphagia class II and the patient was given nectar thick liquid material.  The 

chest x-ray showing some interval improvement with the reappearance and clearing

of the right hemidiaphragm.  The patient is currently on 3 L of oxygen by nasal 

cannula.  The patient was being diuresed with IV Lasix.  The patient was 

switched to oral Lasix by cardiology.  Input output balance has been -1.2 L over

the past 24 hours.  BUN is at 23 with a creatinine of 0.8 and a white cell count

of 8.5 with a hemoglobin of 10.8.  The patient is awake and interactive and 

communicating.  No fever.  No chills.  No other significant events overnight.





Objective





- Vital Signs


Vital signs: 


                                   Vital Signs











Temp  98.1 F   06/30/22 08:00


 


Pulse  75   06/30/22 10:00


 


Resp  21   06/30/22 10:00


 


BP  131/76   06/30/22 10:00


 


Pulse Ox  97   06/30/22 10:00


 


FiO2  30   06/30/22 03:50








                                 Intake & Output











 06/29/22 06/30/22 06/30/22





 18:59 06:59 18:59


 


Intake Total 1044.993 210 340


 


Output Total 674 400 0


 


Balance 370.993 -190 340


 


Weight  101 kg 101 kg


 


Intake:   


 


   210 40


 


    Piperacillin-Tazobactam 3 125 100 





    .375 gm In Sodium   





    Chloride 0.9% 100 ml @ 25   





    mls/hr IVPB Q8HR JESSICA Rx#   





    :[id number]   


 


    Sodium Chloride 0.9% 1, 60 110 40





    000 ml @ 20 mls/hr IV .   





    Q24H JESSICA Rx#:[id number]   


 


  Intake, IV Titration 43.993  





  Amount   


 


    Heparin Sod,Pork in 0.45% 43.993  





    NaCl 25,000 unit In 0.45   





    % NaCl 1 250ml.bag @ 10.   





    02 UNITS/KG/HR 9.999 mls/   





    hr IV .Q24H JESSICA Rx#:   





    [id number]   


 


  Oral 816  300


 


Output:   


 


  Urine 674 400 0


 


Other:   


 


  Voiding Method Indwelling Catheter Indwelling Catheter Urinal


 


  # Voids  0 1














- Exam








GENERAL EXAM: Alert, oriented 2, 77-year-old male patient, and breathing is 

less labored and the patient is currently on 5 L of O2 nasal cannula


HEAD: Normocephalic.


EYES: Normal reaction of pupils, equal size.


NOSE: Clear with pink turbinates.


THROAT: No erythema or exudates.


NECK: No masses, no JVD.


CHEST: No chest wall deformity.


LUNGS: Equal air entry with bilateral scattered rhonchi.  Crackles in lung bases

are still present


CVS: S1 and S2 normal with no audible murmur, regular rhythm.


ABDOMEN: No hepatosplenomegaly, normal bowel sounds, no guarding or rigidity.


SPINE: No scoliosis or deformity


SKIN: No rashes


CENTRAL NERVOUS SYSTEM: No focal deficits, tone is normal in all 4 extremities.


EXTREMITIES: There is no peripheral edema.  No clubbing, no cyanosis.  

Peripheral pulses are intact.








- Labs


CBC & Chem 7: 


                                 06/30/22 05:40





                                 06/30/22 05:40


Labs: 


                  Abnormal Lab Results - Last 24 Hours (Table)











  06/29/22 06/29/22 06/30/22 Range/Units





  15:58 20:23 05:40 


 


RBC     (4.30-5.90)  m/uL


 


Hgb     (13.0-17.5)  gm/dL


 


Hct     (39.0-53.0)  %


 


Sodium    134 L  (137-145)  mmol/L


 


BUN    23 H  (9-20)  mg/dL


 


Glucose    132 H  (74-99)  mg/dL


 


POC Glucose (mg/dL)  325 H  262 H   ()  mg/dL


 


Calcium    8.3 L  (8.4-10.2)  mg/dL














  06/30/22 06/30/22 06/30/22 Range/Units





  05:40 06:35 11:21 


 


RBC  3.45 L    (4.30-5.90)  m/uL


 


Hgb  10.8 L    (13.0-17.5)  gm/dL


 


Hct  32.8 L    (39.0-53.0)  %


 


Sodium     (137-145)  mmol/L


 


BUN     (9-20)  mg/dL


 


Glucose     (74-99)  mg/dL


 


POC Glucose (mg/dL)   168 H  158 H  ()  mg/dL


 


Calcium     (8.4-10.2)  mg/dL








                      Microbiology - Last 24 Hours (Table)











 06/25/22 16:01 Blood Culture - Preliminary





 Blood    No Growth after 96 hours


 


 06/25/22 16:01 Blood Culture - Preliminary





 Blood    No Growth after 96 hours














Assessment and Plan


Plan: 








Acute hypoxemic respiratory failure secondary to aspiration and acute pulmonary 

edema.  The patient was in severe respiratory distress when he arrived to the 

intensive care unit.  Supported by the BiPAP.  The patient was diuresed with IV 

Lasix.  The patient's was also covered with IV Zosyn.  He did suffer an acute 

non-ST segment elevation myocardial infarction.  Clinically improving and the 

patient is currently on 3 L of oxygen by nasal cannula.  His interval 

improvement in the chest x-ray findings special in the right lung base.  The 

patient is spending the night on BiPAP and in the morning he is on 3 L of oxygen

by nasal cannula.  Afebrile.  Hemodynamically stable.  He is post acute non-ST 

segment elevation myocardial infarction.





Acute aspiration on Zosyn





Acute pulmonary edema with acute systolic heart failure and an ejection fraction

of 25-30%





Acute non-ST segment elevation myocardial infarction and initial troponin was at

  13





Hyponatremia, recovered





History of CVA/TIA





Hyperlipidemia





Hypertension





Hypothyroidism





Benign prostatic hyperplasia





Diabetes mellitus





Nursing home resident





Altered mentation patient remains alert and oriented 2 with limited amount of c

ommunication skills, neurologically unchanged





Dysphagia with poor swallowing skills probably related to comorbidities and a 

previous stroke.  Swallow evaluation  needs to be done today





Plan





DNR/DNI CODE STATUS


Keep the patient on 3 L of oxygen by nasal cannula


 subcu heparin 5000 units every 8 hours


Wean down the FiO2 to maintain a saturation above 90%


Continue Lasix and Lasix and switch to oral


Kennedy catheter in place and monitor the urine output


Continue IV Zosyn


Follow-up chest x-ray in a.m.


Swallow evaluation was completed and the patient is being given nectar thick.


Continue aspirin


Continue metoprolol


Continue statins


Dulcolax suppository to facilitate bowel movement activity


Transfer out of the intensive care unit to a medical floor

## 2022-06-30 NOTE — P.PN
Subjective


Progress Note Date: 06/30/22





PROGRESS NOTE


The patient is a 77-year-old male with a history of diabetes, hypertension and 

hyperlipidemia, prior history of stroke who presented with aspiration pneumonia,

yesterday had an acute dyspneic event with evidence of pulmonary edema.  He had 

elevation of his troponin was transferred to the ICU.  He continues to be in 

sinus mechanism, his blood pressure is stable.  He is complaining of some chest 

discomfort and dyspnea this morning.  He denies any dizziness or palpitations.  

His urinary output has been good and he continues to be on IV Lasix.  His 

echocardiogram showed a severely impaired systolic function with apical 

hypokinesis.  The patient is DO NOT RESUSCITATE.


He continues to be on aspirin, Lipitor 40 mg daily, Lasix 40 mg IV every 12 

hours him on Cozaar 25 mg daily, metoprolol tartrate 25 mg twice a day, IV 

heparin.





June 29: The patient is stable, he continues to complain of a sore throat but no

chest discomfort.  He denies any change in his breathing.  He has no dizziness, 

palpitations or syncope.  He continues to be in sinus mechanism.  He has 

evidence of ischemic cardiomyopathy echocardiography.  The patient elected not 

to proceed with any aggressive cardiac workup.  He continues to be on IV heparin

in addition to aspirin, Plavix, Lopressor 25 mg twice a day, losartan 25 mg 

daily and IV Lasix.





June 30: Patient is back on the BiPAP this morning but otherwise has been 

stable.  His breathing has been stable, he denies any chest discomfort, 

dizziness or palpitations.  He continues to be in sinus mechanism with no 

evidence of 


 atrial fibrillation.  He has good urinary output.  He underwent a swallow 

evaluation yesterday, the results are pending.  He continues to be on aspirin, 

Plavix 75 mg daily, Lasix 20 mg orally twice a day, insulin, isosorbide 

mononitrate 30 mg daily, Cozaar 25 mg daily, metoprolol 25 mg twice a day, 

Aldactone 25 mg daily.  His chest x-ray continues to show bilateral infiltrate 

with possible congestion in the aspiration pneumonia.





 PHYSICAL EXAMINATION:


Blood [id number] rate 60


LUNGS: Mild decrease in the breath sounds at the bases, no wheezes 


HEART: Regular rate and rhythm, S1, S2. No S3.  systolic ejection murmur


ABDOMEN: Soft, nontender, no organomegaly


EXTREMETIES: No edema





potassium 4.3, BUN 23, creatinine 0.98, hemoglobin 10.8


 IMPRESSION:


1.  Acute pulmonary edema, improved


2.  Non-STEMI


3.  Aspiration pneumonia


4.  History of CVA and element of dementia


5.  Hypertension


6.  Hyperlipidemia


7.  Diabetes 





PLAN:


1.  1 dose of IV Lasix today


2.  Increase physical activity


3.  Follow blood pressure and adjust beta blocker dose accordingly.


4.  DO NOT RESUSCITATE.  


 








Objective





- Vital Signs


Vital signs: 


                                   Vital Signs











Temp  98.8 F   06/30/22 04:00


 


Pulse  63   06/30/22 06:00


 


Resp  20   06/30/22 06:00


 


BP  105/68   06/30/22 06:00


 


Pulse Ox  98   06/30/22 06:00


 


FiO2  30   06/30/22 03:50








                                 Intake & Output











 06/29/22 06/30/22 06/30/22





 18:59 06:59 18:59


 


Intake Total 1044.993 210 


 


Output Total 674 400 


 


Balance 370.993 -190 


 


Weight  101 kg 


 


Intake:   


 


   210 


 


    Piperacillin-Tazobactam 3 125 100 





    .375 gm In Sodium   





    Chloride 0.9% 100 ml @ 25   





    mls/hr IVPB Q8HR [facility]ESS[facility] Rx#   





    :[id number]   


 


    Sodium Chloride 0.9% 1, 60 110 





    000 ml @ 20 mls/hr IV .   





    Q24H JESSICA Rx#:[id number]   


 


  Intake, IV Titration 43.993  





  Amount   


 


    Heparin Sod,Pork in 0.45% 43.993  





    NaCl 25,000 unit In 0.45   





    % NaCl 1 250ml.bag @ 10.   





    02 UNITS/KG/HR 9.999 mls/   





    hr IV .Q24H JESSICA Rx#:   





    [id number]   


 


  Oral 816  


 


Output:   


 


  Urine 674 400 


 


Other:   


 


  Voiding Method Indwelling Catheter Indwelling Catheter 


 


  # Voids  0 














- Labs


CBC & Chem 7: 


                                 06/30/22 05:40





                                 06/30/22 05:40


Labs: 


                  Abnormal Lab Results - Last 24 Hours (Table)











  06/29/22 06/29/22 06/29/22 Range/Units





  07:26 07:26 07:40 


 


WBC    12.1 H  (3.8-10.6)  k/uL


 


RBC    3.69 L  (4.30-5.90)  m/uL


 


Hgb    11.8 L  (13.0-17.5)  gm/dL


 


Hct    34.9 L  (39.0-53.0)  %


 


APTT  105.6 H*    (22.0-30.0)  sec


 


Sodium   133 L   (137-145)  mmol/L


 


BUN   28 H   (9-20)  mg/dL


 


Glucose   131 H   (74-99)  mg/dL


 


POC Glucose (mg/dL)     ()  mg/dL


 


Calcium     (8.4-10.2)  mg/dL














  06/29/22 06/29/22 06/29/22 Range/Units





  11:13 15:58 20:23 


 


WBC     (3.8-10.6)  k/uL


 


RBC     (4.30-5.90)  m/uL


 


Hgb     (13.0-17.5)  gm/dL


 


Hct     (39.0-53.0)  %


 


APTT     (22.0-30.0)  sec


 


Sodium     (137-145)  mmol/L


 


BUN     (9-20)  mg/dL


 


Glucose     (74-99)  mg/dL


 


POC Glucose (mg/dL)  225 H  325 H  262 H  ()  mg/dL


 


Calcium     (8.4-10.2)  mg/dL














  06/30/22 06/30/22 06/30/22 Range/Units





  05:40 05:40 06:35 


 


WBC     (3.8-10.6)  k/uL


 


RBC   3.45 L   (4.30-5.90)  m/uL


 


Hgb   10.8 L   (13.0-17.5)  gm/dL


 


Hct   32.8 L   (39.0-53.0)  %


 


APTT     (22.0-30.0)  sec


 


Sodium  134 L    (137-145)  mmol/L


 


BUN  23 H    (9-20)  mg/dL


 


Glucose  132 H    (74-99)  mg/dL


 


POC Glucose (mg/dL)    168 H  ()  mg/dL


 


Calcium  8.3 L    (8.4-10.2)  mg/dL








                      Microbiology - Last 24 Hours (Table)











 06/25/22 16:01 Blood Culture - Preliminary





 Blood    No Growth after 96 hours


 


 06/25/22 16:01 Blood Culture - Preliminary





 Blood    No Growth after 96 hours

## 2022-07-01 LAB
ANION GAP SERPL CALC-SCNC: 6 MMOL/L
BUN SERPL-SCNC: 18 MG/DL (ref 9–20)
CALCIUM SPEC-MCNC: 8.6 MG/DL (ref 8.4–10.2)
CHLORIDE SERPL-SCNC: 97 MMOL/L (ref 98–107)
CO2 SERPL-SCNC: 31 MMOL/L (ref 22–30)
ERYTHROCYTE [DISTWIDTH] IN BLOOD BY AUTOMATED COUNT: 3.82 M/UL (ref 4.3–5.9)
ERYTHROCYTE [DISTWIDTH] IN BLOOD: 14.3 % (ref 11.5–15.5)
GLUCOSE BLD-MCNC: 168 MG/DL (ref 70–110)
GLUCOSE BLD-MCNC: 189 MG/DL (ref 70–110)
GLUCOSE BLD-MCNC: 306 MG/DL (ref 70–110)
GLUCOSE BLD-MCNC: 308 MG/DL (ref 70–110)
GLUCOSE SERPL-MCNC: 166 MG/DL (ref 74–99)
HCT VFR BLD AUTO: 36.1 % (ref 39–53)
HGB BLD-MCNC: 12.1 GM/DL (ref 13–17.5)
MAGNESIUM SPEC-SCNC: 2.1 MG/DL (ref 1.6–2.3)
MCH RBC QN AUTO: 31.7 PG (ref 25–35)
MCHC RBC AUTO-ENTMCNC: 33.6 G/DL (ref 31–37)
MCV RBC AUTO: 94.5 FL (ref 80–100)
PLATELET # BLD AUTO: 268 K/UL (ref 150–450)
POTASSIUM SERPL-SCNC: 3.9 MMOL/L (ref 3.5–5.1)
SODIUM SERPL-SCNC: 134 MMOL/L (ref 137–145)
WBC # BLD AUTO: 10.5 K/UL (ref 3.8–10.6)

## 2022-07-01 RX ADMIN — CEFAZOLIN SCH MLS/HR: 330 INJECTION, POWDER, FOR SOLUTION INTRAMUSCULAR; INTRAVENOUS at 12:19

## 2022-07-01 RX ADMIN — THYROID, PORCINE SCH MG: 30 TABLET ORAL at 09:42

## 2022-07-01 RX ADMIN — FUROSEMIDE SCH MG: 20 TABLET ORAL at 09:41

## 2022-07-01 RX ADMIN — PIPERACILLIN AND TAZOBACTAM SCH MLS/HR: 3; .375 INJECTION, POWDER, FOR SOLUTION INTRAVENOUS at 09:40

## 2022-07-01 RX ADMIN — AMOXICILLIN AND CLAVULANATE POTASSIUM SCH EACH: 875; 125 TABLET, FILM COATED ORAL at 12:18

## 2022-07-01 RX ADMIN — ISOSORBIDE MONONITRATE SCH MG: 30 TABLET, EXTENDED RELEASE ORAL at 09:42

## 2022-07-01 RX ADMIN — FUROSEMIDE SCH MG: 20 TABLET ORAL at 21:36

## 2022-07-01 RX ADMIN — ATORVASTATIN CALCIUM SCH MG: 40 TABLET, FILM COATED ORAL at 09:41

## 2022-07-01 RX ADMIN — INSULIN ASPART SCH UNIT: 100 INJECTION, SOLUTION INTRAVENOUS; SUBCUTANEOUS at 12:18

## 2022-07-01 RX ADMIN — INSULIN ASPART SCH UNIT: 100 INJECTION, SOLUTION INTRAVENOUS; SUBCUTANEOUS at 21:36

## 2022-07-01 RX ADMIN — IPRATROPIUM BROMIDE AND ALBUTEROL SULFATE SCH ML: .5; 3 SOLUTION RESPIRATORY (INHALATION) at 18:58

## 2022-07-01 RX ADMIN — IPRATROPIUM BROMIDE AND ALBUTEROL SULFATE SCH ML: .5; 3 SOLUTION RESPIRATORY (INHALATION) at 07:09

## 2022-07-01 RX ADMIN — HEPARIN SODIUM SCH UNIT: 5000 INJECTION INTRAVENOUS; SUBCUTANEOUS at 21:36

## 2022-07-01 RX ADMIN — INSULIN ASPART SCH UNIT: 100 INJECTION, SOLUTION INTRAVENOUS; SUBCUTANEOUS at 17:01

## 2022-07-01 RX ADMIN — METOPROLOL TARTRATE SCH MG: 25 TABLET, FILM COATED ORAL at 21:36

## 2022-07-01 RX ADMIN — METOPROLOL TARTRATE SCH MG: 25 TABLET, FILM COATED ORAL at 09:42

## 2022-07-01 RX ADMIN — INSULIN DETEMIR SCH UNIT: 100 INJECTION, SOLUTION SUBCUTANEOUS at 22:26

## 2022-07-01 RX ADMIN — METHYLPREDNISOLONE SODIUM SUCCINATE SCH: 125 INJECTION, POWDER, FOR SOLUTION INTRAMUSCULAR; INTRAVENOUS at 16:35

## 2022-07-01 RX ADMIN — TAMSULOSIN HYDROCHLORIDE SCH MG: 0.4 CAPSULE ORAL at 09:42

## 2022-07-01 RX ADMIN — IPRATROPIUM BROMIDE AND ALBUTEROL SULFATE SCH: .5; 3 SOLUTION RESPIRATORY (INHALATION) at 15:55

## 2022-07-01 RX ADMIN — HEPARIN SODIUM SCH UNIT: 5000 INJECTION INTRAVENOUS; SUBCUTANEOUS at 09:42

## 2022-07-01 RX ADMIN — CLOPIDOGREL BISULFATE SCH MG: 75 TABLET ORAL at 09:41

## 2022-07-01 RX ADMIN — PANTOPRAZOLE SODIUM SCH MG: 40 TABLET, DELAYED RELEASE ORAL at 06:57

## 2022-07-01 RX ADMIN — ASPIRIN 81 MG CHEWABLE TABLET SCH MG: 81 TABLET CHEWABLE at 09:41

## 2022-07-01 RX ADMIN — IPRATROPIUM BROMIDE AND ALBUTEROL SULFATE SCH ML: .5; 3 SOLUTION RESPIRATORY (INHALATION) at 11:02

## 2022-07-01 RX ADMIN — PIPERACILLIN AND TAZOBACTAM SCH MLS/HR: 3; .375 INJECTION, POWDER, FOR SOLUTION INTRAVENOUS at 00:40

## 2022-07-01 RX ADMIN — SPIRONOLACTONE SCH MG: 25 TABLET, FILM COATED ORAL at 09:42

## 2022-07-01 RX ADMIN — LOSARTAN POTASSIUM SCH MG: 25 TABLET, FILM COATED ORAL at 21:36

## 2022-07-01 RX ADMIN — INSULIN ASPART SCH UNIT: 100 INJECTION, SOLUTION INTRAVENOUS; SUBCUTANEOUS at 06:57

## 2022-07-01 NOTE — P.DS
Providers


Date of admission: 


06/25/22 15:36





Expected date of discharge: 07/01/22


Attending physician: 


Georges Perez MD





Consults: 





                                        





06/25/22 15:36


Consult Physician Stat 


   Consulting Provider: Foreign Corado


   Consult Reason/Comments: Aspiration, hypoxia


   Do you want consulting provider notified?: Already Contacted





06/27/22 12:53


Consult Physician Stat 


   Consulting Provider: Jaime Ray


   Consult Reason/Comments: elevated trop


   Do you want consulting provider notified?: Yes











Primary care physician: 


Stated None





Hospital Course: 





Discharge Diagnosis:


Acute hypoxic respiratory failure secondary to aspiration and acute pulmonary 

edema


Acute systolic heart failure with an ejection fraction 25-30%


Aspiration pneumonia


COPD


Non-ST elevation myocardial infarction


Hypovolemic hyponatremia: Improved while on diuretics


History of CVA/TIA


Hypertension


Hypothyroidism


Benign prostatic hyperplasia





Hospital Course: 


Patient is a 77-year-old male with a history of multiple medical problems 

including diabetes type 2, hypertension, history of CVA, GERD, hyperlipidemia, 

hypothyroidism who presented to the ED from nursing home after he choked on a 

piece of meat.  When patient arrived to the ED he was hypoxic in the 60s and he 

was placed on a nonrebreather mask which brought his oxygen saturation up to the

90s.  Patient is a poor historian.  Patient was admitted to the ICU.





Patient was weaned down to 5 L and he was transferred to the floor.  However 

patient then went back into flash pulmonary edema with severe respiratory 

distress so he was transferred back to the ICU and started on BiPAP as well as 

IV Lasix.





Patient had an echocardiogram done that showed an EF of 25-30%.  Patient also 

found to have a non-ST elevation MI with an elevated troponin of 13.6.  

Cardiology discussed with the patient and he did not want any aggressive 

measures.





Patient is continuing to be diuresed.  He was weaned off the BiPAP.  He was also

eventually weaned down to 3 L nasal cannula.  Patient again worked with speech 

therapy and was cleared for a mechanical chopped diet with nectar thick liquid. 

Patient was able to tolerate his diet as well as taking his pills per ICU nurse.

 He was then deemed stable for discharge to return to the skilled nursing 

facility.





Patient was cleared by both pulmonology and cardiology to return back to the 

skilled nursing facility.  Patient instructed to follow with cardiology.  He 

will be discharged on Augmentin to complete his antibiotic course. Patient was 

started on Imdur losartan Plavix spironolactone Lasix for his newly discovered 

cardiomyopathy.





Patient goals of care also discussed.  He is DNR/DNI.  He has poor prognosis.








Patient seen and examined at bedside.[]





Vital signs reviewed and stable. 


General: [non toxic], [no distress], [appears chronically debilitated]


Derm: [warm], [dry]


Head: [atraumatic], [normocephalic], [symmetric]


Eyes: [EOMI], [no lid lag], [anicteric sclera]


Mouth: [no lip lesion], [mucus membranes moist]


Cardiovascular: [S1S2 reg], [no murmur], [positive posterior tibial pulse 

bilateral], 


Lungs: [Diminished breath sounds bilaterally, no wheezing]


Abdominal: [soft], [ nontender to palpation], [no guarding], [no appreciable 

organomegaly]


Ext: [no gross muscle atrophy], [no edema], [no contractures]


Neuro: [ CN II-XI grossly intact], [no focal neuro deficits]


Psych: [AAO 2 and mildly confused] 








A total of [33] minutes of time were spent preparing this complex discharge 

summary .


Patient Condition at Discharge: Poor





Plan - Discharge Summary


Discharge Rx Participant: No


New Discharge Prescriptions: 


New


   Amoxic-Pot Clav 875-125Mg [Augmentin 875-125] 1 each PO BID 5 Days #10 tab


   Metoprolol Tartrate [Lopressor] 25 mg PO BID  tab


   Clopidogrel [Plavix] 75 mg PO DAILY  tab


   Losartan [Cozaar] 25 mg PO HS  tab


   Spironolactone [Aldactone] 25 mg PO DAILY  tab


   Isosorbide Mononitrate ER [Imdur] 30 mg PO DAILY  tab


   Furosemide [Lasix] 20 mg PO BID  tab





Continue


   Thyroid,Pork [[place] Thyroid] 90 mg PO DAILY


   Finasteride [Proscar] 5 mg PO DAILY


   Aspirin EC [Ecotrin Low Dose] 81 mg PO DAILY


   Atorvastatin [Lipitor] 40 mg PO HS@2000


   Docusate [Colace] 100 mg PO HS


   metFORMIN  mg PO BID


   Multivitamins, Thera [Multivitamin (formulary)] 1 tab PO DAILY


   Tamsulosin HCl [Flomax] 0.4 mg PO DAILY


   sitaGLIPtin [Januvia] 100 mg PO DAILY


   DULoxetine HCL 40 mg PO DAILY


   glipiZIDE [Glucotrol] 5 mg PO DAILY


   Sennosides [Senokot] 8.6 mg PO DAILY


   traMADol HCL 50 mg PO Q6H PRN


     PRN Reason: Pain





Changed


   Insulin Glargine [Lantus Vial] 12 unit SQ HS #0





Discontinued


   Losartan Potassium [Cozaar] 25 mg PO HS


   Trihexyphenidyl HCl 1 mg PO TID


   Nitroglycerin Sl Tabs [Nitrostat] 0.4 mg SL Q5M PRN


     PRN Reason: Chest Pain


   Acetaminophen Tab [Tylenol] 325 mg PO Q6HR PRN  tab


     PRN Reason: Fever And/ Or Pain


   Sodium Chloride Tab 1 gm PO HS


   Metoprolol Succinate [Toprol XL] 50 mg PO DAILY


Discharge Medication List





Thyroid,Pork [[place] Thyroid] 90 mg PO DAILY 06/27/17 [History]


Aspirin EC [Ecotrin Low Dose] 81 mg PO DAILY 06/04/21 [History]


Finasteride [Proscar] 5 mg PO DAILY 06/04/21 [History]


Tamsulosin HCl [Flomax] 0.4 mg PO DAILY 06/04/21 [History]


Atorvastatin [Lipitor] 40 mg PO HS@2000 01/18/22 [History]


sitaGLIPtin [Januvia] 100 mg PO DAILY 01/18/22 [History]


DULoxetine HCL 40 mg PO DAILY 06/25/22 [History]


Docusate [Colace] 100 mg PO HS 06/25/22 [History]


Multivitamins, Thera [Multivitamin (formulary)] 1 tab PO DAILY 06/25/22 

[History]


Sennosides [Senokot] 8.6 mg PO DAILY 06/25/22 [History]


glipiZIDE [Glucotrol] 5 mg PO DAILY 06/25/22 [History]


metFORMIN  mg PO BID 06/25/22 [History]


traMADol HCL 50 mg PO Q6H PRN 06/25/22 [History]


Amoxic-Pot Clav 875-125Mg [Augmentin 875-125] 1 each PO BID 5 Days #10 tab 

07/01/22 [Rx]


Clopidogrel [Plavix] 75 mg PO DAILY  tab 07/01/22 [Rx]


Furosemide [Lasix] 20 mg PO BID  tab 07/01/22 [Rx]


Insulin Glargine [Lantus Vial] 12 unit SQ HS #0 07/01/22 [Rx]


Isosorbide Mononitrate ER [Imdur] 30 mg PO DAILY  tab 07/01/22 [Rx]


Losartan [Cozaar] 25 mg PO HS  tab 07/01/22 [Rx]


Metoprolol Tartrate [Lopressor] 25 mg PO BID  tab 07/01/22 [Rx]


Spironolactone [Aldactone] 25 mg PO DAILY  tab 07/01/22 [Rx]








Follow up Appointment(s)/Referral(s): 


Jaime Ray MD [STAFF PHYSICIAN] - 1 Week


None,Stated [Primary Care Provider] - 1-2 days


Discharge Disposition: TRANSFER TO SNF/ECF

## 2022-07-01 NOTE — P.PN
Subjective


Progress Note Date: 07/01/22





PROGRESS NOTE


The patient is a 77-year-old male with a history of diabetes, hypertension and 

hyperlipidemia, prior history of stroke who presented with aspiration pneumonia,

yesterday had an acute dyspneic event with evidence of pulmonary edema.  He had 

elevation of his troponin was transferred to the ICU.  He continues to be in 

sinus mechanism, his blood pressure is stable.  He is complaining of some chest 

discomfort and dyspnea this morning.  He denies any dizziness or palpitations.  

His urinary output has been good and he continues to be on IV Lasix.  His 

echocardiogram showed a severely impaired systolic function with apical 

hypokinesis.  The patient is DO NOT RESUSCITATE.


He continues to be on aspirin, Lipitor 40 mg daily, Lasix 40 mg IV every 12 

hours him on Cozaar 25 mg daily, metoprolol tartrate 25 mg twice a day, IV 

heparin.





June 29: The patient is stable, he continues to complain of a sore throat but no

chest discomfort.  He denies any change in his breathing.  He has no dizziness, 

palpitations or syncope.  He continues to be in sinus mechanism.  He has 

evidence of ischemic cardiomyopathy echocardiography.  The patient elected not 

to proceed with any aggressive cardiac workup.  He continues to be on IV heparin

in addition to aspirin, Plavix, Lopressor 25 mg twice a day, losartan 25 mg 

daily and IV Lasix.





June 30: Patient is back on the BiPAP this morning but otherwise has been 

stable.  His breathing has been stable, he denies any chest discomfort, 

dizziness or palpitations.  He continues to be in sinus mechanism with no 

evidence of 


 atrial fibrillation.  He has good urinary output.  He underwent a swallow 

evaluation yesterday, the results are pending.  He continues to be on aspirin, 

Plavix 75 mg daily, Lasix 20 mg orally twice a day, insulin, isosorbide 

mononitrate 30 mg daily, Cozaar 25 mg daily, metoprolol 25 mg twice a day, 

Aldactone 25 mg daily.  His chest x-ray continues to show bilateral infiltrate 

with possible congestion in the aspiration pneumonia.





July 1:


The patient is feeling better today, he is on nasal cannula, in sinus mechanism.

 He denies any chest discomfort, dizziness or palpitations.  Hemodynamically he 

is stable.  He is tolerating by mouth intake.  He has no arrhythmia.  His 

urinary output is stable.  He is afebrile.  He continues to be on aspirin, 

Plavix, Lasix 20 mg twice a day, insulin, isosorbide mononitrate 30 mg daily, 

losartan 25 mg daily, metoprolol 25 mg twice a day, spironolactone 25 mg daily





 PHYSICAL EXAMINATION:


Blood [id number] heart rate 64


LUNGS: Mild decrease in the breath sounds at the bases, no wheezes 


HEART: Regular rate and rhythm, S1, S2. No S3.  systolic ejection murmur


ABDOMEN: Soft, nontender, no organomegaly


EXTREMETIES: No edema





potassium3.9, BUN 18, creatinine 0.96, hemoglobin 12.1


 IMPRESSION:


1.  Acute pulmonary edema, resolved


2.  Non-STEMI


3.  Aspiration pneumonia


4.  History of CVA and element of dementia


5.  Hypertension


6.  Hyperlipidemia


7.  Diabetes 





PLAN:


1.  Continue present therapy


2.  Increase physical activity


3.  Transfer to telemetry


4.  Probable discharge soon


5.  Patient is DO NOT RESUSCITATE.


 








Objective





- Vital Signs


Vital signs: 


                                   Vital Signs











Temp  98.9 F   07/01/22 04:00


 


Pulse  64   07/01/22 07:24


 


Resp  22   07/01/22 07:00


 


BP  116/65   07/01/22 07:00


 


Pulse Ox  95   07/01/22 07:00


 


FiO2  30   07/01/22 04:00








                                 Intake & Output











 06/30/22 07/01/22 07/01/22





 18:59 06:59 18:59


 


Intake Total 520 160 10


 


Output Total 0 0 


 


Balance 520 160 10


 


Weight 101 kg 99 kg 


 


Intake:   


 


   160 10


 


    Piperacillin-Tazobactam 3 100 100 





    .375 gm In Sodium   





    Chloride 0.9% 100 ml @ 25   





    mls/hr IVPB Q8HR JESSICA Rx#   





    :[id number]   


 


    Sodium Chloride 0.9% 1, 120 60 10





    000 ml @ 20 mls/hr IV .   





    Q24H JESSICA Rx#:[id number]   


 


  Oral 300  


 


Output:   


 


  Urine 0 0 


 


Other:   


 


  Voiding Method Incontinent Incontinent 


 


  # Voids 1 1 














- Labs


CBC & Chem 7: 


                                 07/01/22 05:57





                                 07/01/22 05:57


Labs: 


                  Abnormal Lab Results - Last 24 Hours (Table)











  06/30/22 06/30/22 06/30/22 Range/Units





  11:21 14:06 16:41 


 


RBC     (4.30-5.90)  m/uL


 


Hgb     (13.0-17.5)  gm/dL


 


Hct     (39.0-53.0)  %


 


Sodium     (137-145)  mmol/L


 


Chloride     ()  mmol/L


 


Carbon Dioxide     (22-30)  mmol/L


 


Glucose     (74-99)  mg/dL


 


POC Glucose (mg/dL)  158 H  185 H  257 H  ()  mg/dL














  06/30/22 07/01/22 07/01/22 Range/Units





  20:26 05:57 05:57 


 


RBC    3.82 L  (4.30-5.90)  m/uL


 


Hgb    12.1 L  (13.0-17.5)  gm/dL


 


Hct    36.1 L  (39.0-53.0)  %


 


Sodium   134 L   (137-145)  mmol/L


 


Chloride   97 L   ()  mmol/L


 


Carbon Dioxide   31 H   (22-30)  mmol/L


 


Glucose   166 H   (74-99)  mg/dL


 


POC Glucose (mg/dL)  238 H    ()  mg/dL














  07/01/22 Range/Units





  06:25 


 


RBC   (4.30-5.90)  m/uL


 


Hgb   (13.0-17.5)  gm/dL


 


Hct   (39.0-53.0)  %


 


Sodium   (137-145)  mmol/L


 


Chloride   ()  mmol/L


 


Carbon Dioxide   (22-30)  mmol/L


 


Glucose   (74-99)  mg/dL


 


POC Glucose (mg/dL)  168 H  ()  mg/dL








                      Microbiology - Last 24 Hours (Table)











 06/25/22 16:01 Blood Culture - Preliminary





 Blood    No Growth after 120 hours


 


 06/25/22 16:01 Blood Culture - Preliminary





 Blood    No Growth after 120 hours

## 2022-07-01 NOTE — P.PN
Subjective


Progress Note Date: 07/01/22








This is 77-year-old male patient from a [facility] care facility who has a history

of diabetes mellitus, CVA/TIA, GERD, hyperlipidemia, hypertension, 

hypothyroidism.  He was brought into the emergency room yesterday by EMS after 

choking while eating a sloppy Yusuf and french fries.  He was quite hypoxic at the

[facility] and when paramedics arrived patient's O2 saturation was in the 60s.  He is 

seen today in the intensive care unit.  He is sitting up.  Awake and alert.  

Somewhat slow to respond.  Oriented 2.  He is currently on 40% Ventimask.  No 

IV fluids.  He was initiated on Zosyn.  Chest x-ray reveals some bilateral 

reticular nodular infiltrates right greater than left.  White count 16.1.  

Hemoglobin 12.0.  Platelets 242.  Sodium 125.  Potassium 4.9.  Bicarb 20.  BUN 

18.  Creatinine 0.7.  Lactic acid 2.0.  AST 84.  ALT 23.  Glucose 242.








06/27/2022, I'm seeing this patient on a medical floor.  I was asked by the 

nursing staff to evaluate him as soon as possible as the patient developed acute

shortness of breath.  At the time of my arrival, the patient was in significant 

respiratory distress.  He was tachycardic, tachypneic, he had a congested cough,

diaphoretic, and was having labored breathing.  No further bouts of aspiration 

per the nursing staff.  The patient was admitted yesterday to the hospital 

because of an aspiration pneumonia started on IV Zosyn.  At that point, a workup

was initiated.  Chest x-ray was ordered that showed lower lobe pulmonary 

infiltrates and pulmonary vessel congestion.  The patient was given a dose of 

Lasix 40 mg IV.  The patient was given IV Solu-Medrol.  The patient was started 

on bronchodilators.  Following that, I started the patient on BiPAP at a pre

ssure of 10/5 cm of water with an FiO2 of 100% and the patient a chance to the 

intensive care unit with understanding that he has a DNR/DNI CODE STATUS.  The 

patient was later on evaluated in the ICU and the patient was already feeling 

better and diuresing.  EKG showed sinus tachycardia.  Troponin came back at 6 

and the patient suffered an acute non-ST segment elevation myocardial 

infarctions the patient will be seen by cardiology.  The white cell count of 

15.0 with hemoglobin 12.1.  The patient's sodium is up to 129, BUN is at 22 with

a creatinine of 0.9 and the potassium level of 4.6.  The proBNP level was 6690.





06/28/2022, the patient is in the intensive care unit.  Events from yesterday 

was noted and the patient obviously went into an acute pulmonary edema on top of

an aspiration pneumonia.  The patient was brought to the intensive care unit.  

The patient was supported by BiPAP.  The patient was diuresed.  Further 

investigation revealed that the patient suffered an acute non-ST segment 

elevation myocardial infarction.  Troponin peaked at 15.  Cardiac rhythm remains

sinus.  The patient got diuresed with IV Lasix.  Produced excellent urine output

and ultimately his breathing improved and the patient was taken off the BiPAP.  

This morning, the patient is on oxygen at 5 L nasal cannula.  The chest x-ray 

showing bilateral lower lobe pulmonary infiltrates in addition to pulmonary 

edema.  The patient remains on IV Zosyn.  Breathing remains slightly labored 

although is more comfortable.  His respiratory rate currently is in the mid 20s.

 The troponin trend as mentioned was up to 13.6 this morning.  His initial 

troponin was at 6.0.  Echocardiogram was repeated and the patient was found to 

have marked impairment of LV function.  The patient's ejection fraction was down

to 25-30%.  This was a suboptimal study due to the poor echo window.  

Nevertheless, LV function was severely diminished with an ejection fraction of 

25-30% and the apex was hypokinetic.  No other valvular abnormalities noted.  

Aortic valve was within normal limits.  Tricuspid valve and the mitral valves 

were also within normal limits.  On today's evaluation, the patient has a white 

cell count of 14.6 with a hemoglobin of 11.2.  Normal coagulation profile noted 

the patient is on IV heparin and the PTT currently it is therapeutic at 55.6.  

Blood sugar is at 163.  The rest of the electrolytes are still pending for now. 

Meanwhile, his fluid balance has been -2.4 L over the past 24 hours.  The patien

t remains on IV Zosyn.  The patient remains on IV Lasix 40 mg every 12 hours.  

The patient remains on IV heparin.  The patient is on Levemir insulin 10 units 

in addition to a sliding scale coverage.  Rest of the home medications of been 

ordered resume.  He is on aspirin.  He is on metoprolol 25 mg by mouth twice a 

day.  He is on no pressors for now.  Kennedy catheter is in place.  

Neurologically, he does communicate yet his communication is limited.  He is 

alert and oriented 2.  He is moving all 4 extremities.





29th 2022, neurologically the patient is unchanged.  Is able to answer questions

and follows simple commands.  He does have underlying dementia.  He also has 

underlying CVA along with various comorbidities.  The patient got transferred to

the intensive care unit after the patient developed acute hypoxic respiratory 

failure, CHF and bilateral pneumonias.  He also states suffered an acute ST 

segment elevation myocardial infarction.  Currently is on IV Zosyn.  Swallow 

evaluation is still in progress and this will be completed today.  Meanwhile, 

the patient is taking pured diet.  His chest x-ray from today is showing 

bilateral perihilar pulmonary infiltrates and lower lobe pleural effusions and 

consolidations.  This is a combination of CHF and aspiration pneumonia.  As 

mentioned, the patient remains on IV Zosyn.  The patient is also on IV Lasix 40 

mg every 12 hours.  Overall fluid balance over the past 24 hours has been -2.4 L

followed by a -1.2 L.  For now, the patient is on 5 L of oxygen by nasal 

cannula.  The white cells of 12.1 with a hemoglobin of 11.8.  The patient's BUN 

is at 28 with a creatinine of 1.02 and the sodium level is at 133.  The patient 

was on IV heparin for yesterday and the IV heparin was discontinued this 

morning.  Hemodynamically stable.  He remains on aspirin and Plavix.  He is also

on metoprolol 25 mg by mouth twice a day in addition to Aldactone.  Home 

medications of been all resume.  Kennedy cath is in place.  He does use the BiPAP 

overnight and currently is off the BiPAP and back on oxygen by nasal cannula.  

His echocardiogram revealed severe cardiomyopathy with an ejection fraction of 

25-30%.  I discussed the case with cardiology.  Based on his comorbidities and 

DNR/DNI CODE STATUS, we are going to treat his acute  STEMI medically.





06/30/2022, the patient is doing well.  The patient spent the night on a BiPAP 

at a pressure of 14/6 cm of water with an FiO2 of 30% and the patient is 

currently on 3 L.  Swallow evaluation was done and the patient was found to have

dysphagia class II and the patient was given nectar thick liquid material.  The 

chest x-ray showing some interval improvement with the reappearance and clearing

of the right hemidiaphragm.  The patient is currently on 3 L of oxygen by nasal 

cannula.  The patient was being diuresed with IV Lasix.  The patient was 

switched to oral Lasix by cardiology.  Input output balance has been -1.2 L over

the past 24 hours.  BUN is at 23 with a creatinine of 0.8 and a white cell count

of 8.5 with a hemoglobin of 10.8.  The patient is awake and interactive and 

communicating.  No fever.  No chills.  No other significant events overnight.





1 2022, no change in this patient's condition and he remains on oxygen at 3 L 

with a pulse ox of 95%.  Swallowing adequately.  No further bouts of aspiration.

 Currently on IV Zosyn.  Currently on oral Lasix.  His post acute non-ST e

levation myocardial infarction.  He has a DNR/DNI CODE STATUS.  History of any 

chest pain and he is hemodynamically stable.  He has cardiomyopathy with 

impaired left ventricular ejection fraction post acute non-ST segment elevation 

myocardial infarction.  The wife is at 10.5 with a hemoglobin of 12.1 and a 

platelet count of 268.  Sodium is 134 with a BUN of 18 and a creatinine of 0.9. 

No significant blood work abnormalities.





Objective





- Vital Signs


Vital signs: 


                                   Vital Signs











Temp  98.9 F   07/01/22 04:00


 


Pulse  64   07/01/22 07:24


 


Resp  22   07/01/22 07:00


 


BP  116/65   07/01/22 07:00


 


Pulse Ox  95   07/01/22 07:00


 


FiO2  30   07/01/22 04:00








                                 Intake & Output











 06/30/22 07/01/22 07/01/22





 18:59 06:59 18:59


 


Intake Total 520 160 10


 


Output Total 0 0 


 


Balance 520 160 10


 


Weight 101 kg 99 kg 


 


Intake:   


 


   160 10


 


    Piperacillin-Tazobactam 3 100 100 





    .375 gm In Sodium   





    Chloride 0.9% 100 ml @ 25   





    mls/hr IVPB Q8HR JESSICA Rx#   





    :[id number]   


 


    Sodium Chloride 0.9% 1, 120 60 10





    000 ml @ 20 mls/hr IV .   





    Q24H JESSICA Rx#:[id number]   


 


  Oral 300  


 


Output:   


 


  Urine 0 0 


 


Other:   


 


  Voiding Method Incontinent Incontinent 


 


  # Voids 1 1 














- Exam








GENERAL EXAM: Alert, oriented 2, 77-year-old male patient, and breathing is 

less labored and the patient is currently on 5 L of O2 nasal cannula


HEAD: Normocephalic.


EYES: Normal reaction of pupils, equal size.


NOSE: Clear with pink turbinates.


THROAT: No erythema or exudates.


NECK: No masses, no JVD.


CHEST: No chest wall deformity.


LUNGS: Equal air entry with bilateral scattered rhonchi.  Crackles in lung bases

are still present


CVS: S1 and S2 normal with no audible murmur, regular rhythm.


ABDOMEN: No hepatosplenomegaly, normal bowel sounds, no guarding or rigidity.


SPINE: No scoliosis or deformity


SKIN: No rashes


CENTRAL NERVOUS SYSTEM: No focal deficits, tone is normal in all 4 extremities.


EXTREMITIES: There is no peripheral edema.  No clubbing, no cyanosis.  

Peripheral pulses are intact.








- Labs


CBC & Chem 7: 


                                 07/01/22 05:57





                                 07/01/22 05:57


Labs: 


                  Abnormal Lab Results - Last 24 Hours (Table)











  06/30/22 06/30/22 06/30/22 Range/Units





  11:21 14:06 16:41 


 


RBC     (4.30-5.90)  m/uL


 


Hgb     (13.0-17.5)  gm/dL


 


Hct     (39.0-53.0)  %


 


Sodium     (137-145)  mmol/L


 


Chloride     ()  mmol/L


 


Carbon Dioxide     (22-30)  mmol/L


 


Glucose     (74-99)  mg/dL


 


POC Glucose (mg/dL)  158 H  185 H  257 H  ()  mg/dL














  06/30/22 07/01/22 07/01/22 Range/Units





  20:26 05:57 05:57 


 


RBC    3.82 L  (4.30-5.90)  m/uL


 


Hgb    12.1 L  (13.0-17.5)  gm/dL


 


Hct    36.1 L  (39.0-53.0)  %


 


Sodium   134 L   (137-145)  mmol/L


 


Chloride   97 L   ()  mmol/L


 


Carbon Dioxide   31 H   (22-30)  mmol/L


 


Glucose   166 H   (74-99)  mg/dL


 


POC Glucose (mg/dL)  238 H    ()  mg/dL














  07/01/22 Range/Units





  06:25 


 


RBC   (4.30-5.90)  m/uL


 


Hgb   (13.0-17.5)  gm/dL


 


Hct   (39.0-53.0)  %


 


Sodium   (137-145)  mmol/L


 


Chloride   ()  mmol/L


 


Carbon Dioxide   (22-30)  mmol/L


 


Glucose   (74-99)  mg/dL


 


POC Glucose (mg/dL)  168 H  ()  mg/dL








                      Microbiology - Last 24 Hours (Table)











 06/25/22 16:01 Blood Culture - Preliminary





 Blood    No Growth after 120 hours


 


 06/25/22 16:01 Blood Culture - Preliminary





 Blood    No Growth after 120 hours














Assessment and Plan


Plan: 








Acute hypoxemic respiratory failure secondary to aspiration and acute pulmonary 

edema.  The patient was in severe respiratory distress when he arrived to the 

intensive care unit.  Supported by the BiPAP.  The patient was diuresed with IV 

Lasix.  The patient's was also covered with IV Zosyn.  He did suffer an acute 

non-ST segment elevation myocardial infarction.  Clinically improving and the 

patient is currently on 3 L of oxygen by nasal cannula.  His interval 

improvement in the chest x-ray findings special in the right lung base.  The 

patient is spending the night on BiPAP and in the morning he is on 3 L of oxygen

by nasal cannula.  Afebrile.  Hemodynamically stable.  He is post acute non-ST 

segment elevation myocardial infarction.





Acute aspiration on Zosyn





Acute pulmonary edema with acute systolic heart failure and an ejection fraction

of 25-30%





Acute non-ST segment elevation myocardial infarction and initial troponin was at

  13





Hyponatremia, recovered





History of CVA/TIA





Hyperlipidemia





Hypertension





Hypothyroidism





Benign prostatic hyperplasia





Diabetes mellitus





Nursing home resident





Altered mentation patient remains alert and oriented 2 with limited amount of 

communication skills, neurologically unchanged





Dysphagia with poor swallowing skills probably related to comorbidities and a 

previous stroke.  Swallow evaluation  needs to be done today





Plan





Clinically stable, unchanged compared to yesterday


I'm going to stop the IV Zosyn and put the patient on Augmentin 875 mg by mouth 

twice a day to complete a seven-day course


DNR/DNI CODE STATUS


Keep the patient on 3 L of oxygen by nasal cannula


 subcu heparin 5000 units every 8 hours


Wean down the FiO2 to maintain a saturation above 90%


Continue oral Lasix


Aspiration precautions


Swallow evaluation was noted


Continue aspirin


Continue metoprolol


Continue statins


Dulcolax suppository to facilitate bowel movement activity


Transfer  either to a medical floor or possibly even back to the nursing home 

after the medical team is agreeable.

## 2022-07-01 NOTE — P.PN
Subjective


Progress Note Date: 07/01/22


Principal diagnosis: 





CC: Shortness of breath





This patient is tolerating his meds and diet.  Patient states that he has 

intermittent shortness of breath.  Patient has been cleared by pulmonology for 

discharge.  I suspect we will cardiology who said stable for discharge from 

their standpoint.  I discussed with  who said a prior authorization

has been initiated.  Anticipated that patient will be able to return to the 

skilled nursing facility today or tomorrow





Objective





- Vital Signs


Vital signs: 


                                   Vital Signs











Temp  98.7 F   07/01/22 12:00


 


Pulse  64   07/01/22 13:00


 


Resp  22   07/01/22 13:00


 


BP  99/53   07/01/22 13:00


 


Pulse Ox  96   07/01/22 13:00


 


FiO2  30   07/01/22 04:00








                                 Intake & Output











 06/30/22 07/01/22 07/01/22





 18:59 06:59 18:59


 


Intake Total 520 160 520


 


Output Total 0 0 0


 


Balance 520 160 520


 


Weight 101 kg 99 kg 


 


Intake:   


 


   160 170


 


    Piperacillin-Tazobactam 3 100 100 100





    .375 gm In Sodium   





    Chloride 0.9% 100 ml @ 25   





    mls/hr IVPB Q8HR JESSICA Rx#   





    :[id number]   


 


    Sodium Chloride 0.9% 1, 120 60 70





    000 ml @ 20 mls/hr IV .   





    Q24H JESSICA Rx#:[id number]   


 


  Oral 300  350


 


Output:   


 


  Urine 0 0 0


 


Other:   


 


  Voiding Method Incontinent Incontinent Incontinent


 


  # Voids 1 1 1














- Exam





General examination - Alert  and Oriented 3 in NAD


Heart - + S1S2  no murmurs


Lungs - diminished breath sounds bilaterally, no wheezing on exam


Abdomen soft NT ND +ve BS


Extremities - trace bilateral pitting edema


CNS - Moving all 4 extremities spontaneously


Psych - Calm and cooperative, mildly confused





- Labs


CBC & Chem 7: 


                                 07/01/22 05:57





                                 07/01/22 05:57


Labs: 


                  Abnormal Lab Results - Last 24 Hours (Table)











  06/30/22 06/30/22 07/01/22 Range/Units





  16:41 20:26 05:57 


 


RBC     (4.30-5.90)  m/uL


 


Hgb     (13.0-17.5)  gm/dL


 


Hct     (39.0-53.0)  %


 


Sodium    134 L  (137-145)  mmol/L


 


Chloride    97 L  ()  mmol/L


 


Carbon Dioxide    31 H  (22-30)  mmol/L


 


Glucose    166 H  (74-99)  mg/dL


 


POC Glucose (mg/dL)  257 H  238 H   ()  mg/dL














  07/01/22 07/01/22 07/01/22 Range/Units





  05:57 06:25 11:53 


 


RBC  3.82 L    (4.30-5.90)  m/uL


 


Hgb  12.1 L    (13.0-17.5)  gm/dL


 


Hct  36.1 L    (39.0-53.0)  %


 


Sodium     (137-145)  mmol/L


 


Chloride     ()  mmol/L


 


Carbon Dioxide     (22-30)  mmol/L


 


Glucose     (74-99)  mg/dL


 


POC Glucose (mg/dL)   168 H  189 H  ()  mg/dL








                      Microbiology - Last 24 Hours (Table)











 06/25/22 16:01 Blood Culture - Preliminary





 Blood    No Growth after 120 hours


 


 06/25/22 16:01 Blood Culture - Preliminary





 Blood    No Growth after 120 hours














Assessment and Plan


Assessment: 





Acute hypoxic respiratory failure secondary to aspiration acute pulmonary edema


Acute systolic heart failure with an ejection fraction 25-30%


Aspiration pneumonia


COPD


-Patient off BiPAP and on 3 L nasal cannula


-Patient transition to oral Lasix


-Resume Aldactone


-Patient on losartan and metoprolol


-Patient transition to oral antibiotics with Augmentin


-Resume DuoNeb


-Cardiology and pulmonology on board


-Speech eval -> clear patient for dysphagia 2 diet with nectar thick liquid.  

Patient passed barium swallow study








Non-ST segment elevation myocardial infarction


- Resume aspirin metoprolol Plavix and statin


-Patient transition from heparin drip to subcu heparin


-Cardiology discussed the patient who said he did not want any aggressive 

measures.





Hypervolemic Hyponatremia


-Improving on diuretics





History of CVA/TIA


-Resume aspirin and statin





Hypertension


-Resume losartan, metoprolol





Hypothyroidism


-Resume [place] Thyroid





Benign prostatic hyperplasia


-Resume Flomax





Consult PT OT





Diabetes mellitus


-Sliding-scale insulin


CODE STATUS: DNR/DNI


Anticipated discharge: Likely Tuesday 07/05/2022 after the holidays

## 2022-07-02 LAB
ANION GAP SERPL CALC-SCNC: 8.2 MMOL/L (ref 10–18)
BASOPHILS # BLD AUTO: 0.02 X 10*3/UL (ref 0–0.1)
BASOPHILS NFR BLD AUTO: 0.2 %
BUN SERPL-SCNC: 13.2 MG/DL (ref 9–27)
BUN/CREAT SERPL: 14.67 RATIO (ref 12–20)
CALCIUM SPEC-MCNC: 8.8 MG/DL (ref 8.7–10.3)
CHLORIDE SERPL-SCNC: 97 MMOL/L (ref 96–109)
CO2 SERPL-SCNC: 27.8 MMOL/L (ref 20–27.5)
EOSINOPHIL # BLD AUTO: 0.55 X 10*3/UL (ref 0.04–0.35)
EOSINOPHIL NFR BLD AUTO: 4.7 %
ERYTHROCYTE [DISTWIDTH] IN BLOOD BY AUTOMATED COUNT: 3.55 X 10*6/UL (ref 4.4–5.6)
ERYTHROCYTE [DISTWIDTH] IN BLOOD: 14.1 % (ref 11.5–14.5)
GLUCOSE BLD-MCNC: 184 MG/DL (ref 70–110)
GLUCOSE BLD-MCNC: 193 MG/DL (ref 70–110)
GLUCOSE BLD-MCNC: 257 MG/DL (ref 70–110)
GLUCOSE BLD-MCNC: 281 MG/DL (ref 70–110)
GLUCOSE SERPL-MCNC: 183 MG/DL (ref 70–110)
HCT VFR BLD AUTO: 32.9 % (ref 39.6–50)
HGB BLD-MCNC: 10.7 G/DL (ref 13–17)
IMM GRANULOCYTES BLD QL AUTO: 0.9 %
LYMPHOCYTES # SPEC AUTO: 2.91 X 10*3/UL (ref 0.9–5)
LYMPHOCYTES NFR SPEC AUTO: 25 %
MAGNESIUM SPEC-SCNC: 2.1 MG/DL (ref 1.5–2.4)
MCH RBC QN AUTO: 30.1 PG (ref 27–32)
MCHC RBC AUTO-ENTMCNC: 32.5 G/DL (ref 32–37)
MCV RBC AUTO: 92.7 FL (ref 80–97)
MONOCYTES # BLD AUTO: 1.05 X 10*3/UL (ref 0.2–1)
MONOCYTES NFR BLD AUTO: 9 %
NEUTROPHILS # BLD AUTO: 6.98 X 10*3/UL (ref 1.8–7.7)
NEUTROPHILS NFR BLD AUTO: 60.2 %
NRBC BLD AUTO-RTO: 0 /100 WBCS (ref 0–0)
PLATELET # BLD AUTO: 264 X 10*3/UL (ref 140–440)
POTASSIUM SERPL-SCNC: 4.4 MMOL/L (ref 3.5–5.5)
SODIUM SERPL-SCNC: 133 MMOL/L (ref 135–145)
WBC # BLD AUTO: 11.62 X 10*3/UL (ref 4.5–10)

## 2022-07-02 RX ADMIN — IPRATROPIUM BROMIDE AND ALBUTEROL SULFATE SCH ML: .5; 3 SOLUTION RESPIRATORY (INHALATION) at 07:23

## 2022-07-02 RX ADMIN — IPRATROPIUM BROMIDE AND ALBUTEROL SULFATE SCH ML: .5; 3 SOLUTION RESPIRATORY (INHALATION) at 20:25

## 2022-07-02 RX ADMIN — NITROGLYCERIN SCH: 20 OINTMENT TOPICAL at 08:31

## 2022-07-02 RX ADMIN — METOPROLOL TARTRATE SCH MG: 25 TABLET, FILM COATED ORAL at 21:56

## 2022-07-02 RX ADMIN — THYROID, PORCINE SCH MG: 30 TABLET ORAL at 08:16

## 2022-07-02 RX ADMIN — INSULIN ASPART SCH UNIT: 100 INJECTION, SOLUTION INTRAVENOUS; SUBCUTANEOUS at 08:14

## 2022-07-02 RX ADMIN — INSULIN ASPART SCH UNIT: 100 INJECTION, SOLUTION INTRAVENOUS; SUBCUTANEOUS at 21:56

## 2022-07-02 RX ADMIN — FUROSEMIDE SCH MG: 20 TABLET ORAL at 08:16

## 2022-07-02 RX ADMIN — INSULIN ASPART SCH UNIT: 100 INJECTION, SOLUTION INTRAVENOUS; SUBCUTANEOUS at 13:00

## 2022-07-02 RX ADMIN — ISOSORBIDE MONONITRATE SCH MG: 30 TABLET, EXTENDED RELEASE ORAL at 08:15

## 2022-07-02 RX ADMIN — INSULIN ASPART SCH UNIT: 100 INJECTION, SOLUTION INTRAVENOUS; SUBCUTANEOUS at 17:09

## 2022-07-02 RX ADMIN — HEPARIN SODIUM SCH UNIT: 5000 INJECTION INTRAVENOUS; SUBCUTANEOUS at 08:14

## 2022-07-02 RX ADMIN — IPRATROPIUM BROMIDE AND ALBUTEROL SULFATE SCH ML: .5; 3 SOLUTION RESPIRATORY (INHALATION) at 11:04

## 2022-07-02 RX ADMIN — IPRATROPIUM BROMIDE AND ALBUTEROL SULFATE SCH ML: .5; 3 SOLUTION RESPIRATORY (INHALATION) at 15:58

## 2022-07-02 RX ADMIN — SPIRONOLACTONE SCH MG: 25 TABLET, FILM COATED ORAL at 08:15

## 2022-07-02 RX ADMIN — ASPIRIN 81 MG CHEWABLE TABLET SCH MG: 81 TABLET CHEWABLE at 08:15

## 2022-07-02 RX ADMIN — LOSARTAN POTASSIUM SCH MG: 25 TABLET, FILM COATED ORAL at 21:55

## 2022-07-02 RX ADMIN — PANTOPRAZOLE SODIUM SCH MG: 40 TABLET, DELAYED RELEASE ORAL at 08:15

## 2022-07-02 RX ADMIN — HEPARIN SODIUM SCH: 5000 INJECTION INTRAVENOUS; SUBCUTANEOUS at 22:55

## 2022-07-02 RX ADMIN — TAMSULOSIN HYDROCHLORIDE SCH MG: 0.4 CAPSULE ORAL at 08:15

## 2022-07-02 RX ADMIN — CLOPIDOGREL BISULFATE SCH MG: 75 TABLET ORAL at 08:15

## 2022-07-02 RX ADMIN — METOPROLOL TARTRATE SCH MG: 25 TABLET, FILM COATED ORAL at 08:15

## 2022-07-02 RX ADMIN — CEFAZOLIN SCH: 330 INJECTION, POWDER, FOR SOLUTION INTRAMUSCULAR; INTRAVENOUS at 13:00

## 2022-07-02 RX ADMIN — FUROSEMIDE SCH MG: 20 TABLET ORAL at 21:55

## 2022-07-02 RX ADMIN — INSULIN DETEMIR SCH UNIT: 100 INJECTION, SOLUTION SUBCUTANEOUS at 21:56

## 2022-07-02 RX ADMIN — AMOXICILLIN AND CLAVULANATE POTASSIUM SCH EACH: 875; 125 TABLET, FILM COATED ORAL at 08:15

## 2022-07-02 RX ADMIN — ATORVASTATIN CALCIUM SCH MG: 40 TABLET, FILM COATED ORAL at 08:15

## 2022-07-02 NOTE — P.PN
Subjective


Progress Note Date: 07/02/22


This is a 77-year-old gentleman with a history of diabetes, hypertension 

hyperlipidemia, prior stroke.  Presented with aspiration pneumonia.  Was noted 

to have elevated troponins.  Echocardiogram showed severely impaired systolic 

function with apical hypokinesis.  He is a DO NOT RESUSCITATE.  He is currently 

on MedSurg floor he is Hemoccult hemodynamically stable.  He is somewhat 

lethargic this morning.  Planning for discharge to skilled nursing facility 

hopefully today.  They're apparently waiting for prior authorization.  He is 

maintaining sinus mechanism.  He continues to be on Augmentin, aspirin, Lipitor,

Plavix, Cymbalta, Lasix, insulin, isosorbide, losartan, metoprolol, 

spironolactone, Flomax, thyroid and Ultram.  Vital signs are stable.








Objective





- Vital Signs


Vital signs: 


                                   Vital Signs











Temp  98.0 F   07/02/22 07:35


 


Pulse  67   07/02/22 07:35


 


Resp  18   07/02/22 07:35


 


BP  119/72   07/02/22 07:35


 


Pulse Ox  96   07/02/22 07:35


 


FiO2  30   07/02/22 04:19








                                 Intake & Output











 07/01/22 07/02/22 07/02/22





 18:59 06:59 18:59


 


Intake Total 520  


 


Output Total 0 200 


 


Balance 520 -200 


 


Weight  101 kg 


 


Intake:   


 


    


 


    Piperacillin-Tazobactam 3 100  





    .375 gm In Sodium   





    Chloride 0.9% 100 ml @ 25   





    mls/hr IVPB Q8HR JESSICA Rx#   





    :[id number]   


 


    Sodium Chloride 0.9% 1, 70  





    000 ml @ 20 mls/hr IV .   





    Q24H JESSICA Rx#:[id number]   


 


  Oral 350  


 


Output:   


 


  Urine 0 200 


 


Other:   


 


  Voiding Method Incontinent  


 


  # Voids 1  


 


  # Bowel Movements 1  














- Exam


PHYSICAL EXAMINATION: 





HEENT: Head is atraumatic, normocephalic.  Pupils equal, round.  Neck is supple.

 There is no elevated jugular venous pressure.





HEART EXAMINATION: Heart sounds regular, S1 and S2 normal with a systolic 

ejection murmur.





CHEST EXAMINATION: Lungs are diminished to auscultation. No chest wall 

tenderness is noted on palpation or with deep breathing.





ABDOMEN:  Soft, nontender. Bowel sounds are heard. No organomegaly noted.


 


EXTREMITIES: No evidence of peripheral edema and no calf tenderness noted.











- Labs


CBC & Chem 7: 


                                 07/02/22 06:53





                                 07/02/22 06:53


Labs: 


                  Abnormal Lab Results - Last 24 Hours (Table)











  07/01/22 07/01/22 07/01/22 Range/Units





  11:53 16:59 20:28 


 


POC Glucose (mg/dL)  189 H  308 H  306 H  ()  mg/dL














  07/02/22 Range/Units





  07:09 


 


POC Glucose (mg/dL)  184 H  ()  mg/dL








                      Microbiology - Last 24 Hours (Table)











 06/25/22 16:01 Blood Culture - Final





 Blood    No Growth after 144 hours


 


 06/25/22 16:01 Blood Culture - Final





 Blood    No Growth after 144 hours














Assessment and Plan


Assessment: 


1.  Acute pulmonary edema, resolved


2.  Non-STEMI


3.  Aspiration pneumonia


4.  History of CVA and element of dementia


5.  Hypertension


6.  Hyperlipidemia


7.  Diabetes 





Plan: 


From cardiology's perspective medications were reviewed and we will continue the

same.  We anticipate patient to be discharged to skilled nursing facility soon. 

We will continue to follow the patient during this admission and provide further

recommendations accordingly.





NP note has been reviewed, I agree with a documented findings and plan of care. 

Patient was seen and examined.

## 2022-07-02 NOTE — P.PN
Subjective


Progress Note Date: 07/02/22








This is 77-year-old male patient from a [facility] care facility who has a history

of diabetes mellitus, CVA/TIA, GERD, hyperlipidemia, hypertension, 

hypothyroidism.  He was brought into the emergency room yesterday by EMS after 

choking while eating a sloppy Yusuf and french fries.  He was quite hypoxic at the

[facility] and when paramedics arrived patient's O2 saturation was in the 60s.  He is 

seen today in the intensive care unit.  He is sitting up.  Awake and alert.  

Somewhat slow to respond.  Oriented 2.  He is currently on 40% Ventimask.  No 

IV fluids.  He was initiated on Zosyn.  Chest x-ray reveals some bilateral 

reticular nodular infiltrates right greater than left.  White count 16.1.  

Hemoglobin 12.0.  Platelets 242.  Sodium 125.  Potassium 4.9.  Bicarb 20.  BUN 

18.  Creatinine 0.7.  Lactic acid 2.0.  AST 84.  ALT 23.  Glucose 242.








06/27/2022, I'm seeing this patient on a medical floor.  I was asked by the 

nursing staff to evaluate him as soon as possible as the patient developed acute

shortness of breath.  At the time of my arrival, the patient was in significant 

respiratory distress.  He was tachycardic, tachypneic, he had a congested cough,

diaphoretic, and was having labored breathing.  No further bouts of aspiration 

per the nursing staff.  The patient was admitted yesterday to the hospital 

because of an aspiration pneumonia started on IV Zosyn.  At that point, a workup

was initiated.  Chest x-ray was ordered that showed lower lobe pulmonary 

infiltrates and pulmonary vessel congestion.  The patient was given a dose of 

Lasix 40 mg IV.  The patient was given IV Solu-Medrol.  The patient was started 

on bronchodilators.  Following that, I started the patient on BiPAP at a pre

ssure of 10/5 cm of water with an FiO2 of 100% and the patient a chance to the 

intensive care unit with understanding that he has a DNR/DNI CODE STATUS.  The 

patient was later on evaluated in the ICU and the patient was already feeling 

better and diuresing.  EKG showed sinus tachycardia.  Troponin came back at 6 

and the patient suffered an acute non-ST segment elevation myocardial 

infarctions the patient will be seen by cardiology.  The white cell count of 

15.0 with hemoglobin 12.1.  The patient's sodium is up to 129, BUN is at 22 with

a creatinine of 0.9 and the potassium level of 4.6.  The proBNP level was 6690.





06/28/2022, the patient is in the intensive care unit.  Events from yesterday 

was noted and the patient obviously went into an acute pulmonary edema on top of

an aspiration pneumonia.  The patient was brought to the intensive care unit.  

The patient was supported by BiPAP.  The patient was diuresed.  Further 

investigation revealed that the patient suffered an acute non-ST segment 

elevation myocardial infarction.  Troponin peaked at 15.  Cardiac rhythm remains

sinus.  The patient got diuresed with IV Lasix.  Produced excellent urine output

and ultimately his breathing improved and the patient was taken off the BiPAP.  

This morning, the patient is on oxygen at 5 L nasal cannula.  The chest x-ray 

showing bilateral lower lobe pulmonary infiltrates in addition to pulmonary 

edema.  The patient remains on IV Zosyn.  Breathing remains slightly labored 

although is more comfortable.  His respiratory rate currently is in the mid 20s.

 The troponin trend as mentioned was up to 13.6 this morning.  His initial 

troponin was at 6.0.  Echocardiogram was repeated and the patient was found to 

have marked impairment of LV function.  The patient's ejection fraction was down

to 25-30%.  This was a suboptimal study due to the poor echo window.  

Nevertheless, LV function was severely diminished with an ejection fraction of 

25-30% and the apex was hypokinetic.  No other valvular abnormalities noted.  

Aortic valve was within normal limits.  Tricuspid valve and the mitral valves 

were also within normal limits.  On today's evaluation, the patient has a white 

cell count of 14.6 with a hemoglobin of 11.2.  Normal coagulation profile noted 

the patient is on IV heparin and the PTT currently it is therapeutic at 55.6.  

Blood sugar is at 163.  The rest of the electrolytes are still pending for now. 

Meanwhile, his fluid balance has been -2.4 L over the past 24 hours.  The patien

t remains on IV Zosyn.  The patient remains on IV Lasix 40 mg every 12 hours.  

The patient remains on IV heparin.  The patient is on Levemir insulin 10 units 

in addition to a sliding scale coverage.  Rest of the home medications of been 

ordered resume.  He is on aspirin.  He is on metoprolol 25 mg by mouth twice a 

day.  He is on no pressors for now.  Kennedy catheter is in place.  

Neurologically, he does communicate yet his communication is limited.  He is 

alert and oriented 2.  He is moving all 4 extremities.





29th 2022, neurologically the patient is unchanged.  Is able to answer questions

and follows simple commands.  He does have underlying dementia.  He also has 

underlying CVA along with various comorbidities.  The patient got transferred to

the intensive care unit after the patient developed acute hypoxic respiratory 

failure, CHF and bilateral pneumonias.  He also states suffered an acute ST 

segment elevation myocardial infarction.  Currently is on IV Zosyn.  Swallow 

evaluation is still in progress and this will be completed today.  Meanwhile, 

the patient is taking pured diet.  His chest x-ray from today is showing 

bilateral perihilar pulmonary infiltrates and lower lobe pleural effusions and 

consolidations.  This is a combination of CHF and aspiration pneumonia.  As 

mentioned, the patient remains on IV Zosyn.  The patient is also on IV Lasix 40 

mg every 12 hours.  Overall fluid balance over the past 24 hours has been -2.4 L

followed by a -1.2 L.  For now, the patient is on 5 L of oxygen by nasal 

cannula.  The white cells of 12.1 with a hemoglobin of 11.8.  The patient's BUN 

is at 28 with a creatinine of 1.02 and the sodium level is at 133.  The patient 

was on IV heparin for yesterday and the IV heparin was discontinued this 

morning.  Hemodynamically stable.  He remains on aspirin and Plavix.  He is also

on metoprolol 25 mg by mouth twice a day in addition to Aldactone.  Home 

medications of been all resume.  Kennedy cath is in place.  He does use the BiPAP 

overnight and currently is off the BiPAP and back on oxygen by nasal cannula.  

His echocardiogram revealed severe cardiomyopathy with an ejection fraction of 

25-30%.  I discussed the case with cardiology.  Based on his comorbidities and 

DNR/DNI CODE STATUS, we are going to treat his acute  STEMI medically.





06/30/2022, the patient is doing well.  The patient spent the night on a BiPAP 

at a pressure of 14/6 cm of water with an FiO2 of 30% and the patient is 

currently on 3 L.  Swallow evaluation was done and the patient was found to have

dysphagia class II and the patient was given nectar thick liquid material.  The 

chest x-ray showing some interval improvement with the reappearance and clearing

of the right hemidiaphragm.  The patient is currently on 3 L of oxygen by nasal 

cannula.  The patient was being diuresed with IV Lasix.  The patient was 

switched to oral Lasix by cardiology.  Input output balance has been -1.2 L over

the past 24 hours.  BUN is at 23 with a creatinine of 0.8 and a white cell count

of 8.5 with a hemoglobin of 10.8.  The patient is awake and interactive and 

communicating.  No fever.  No chills.  No other significant events overnight.





1 2022, no change in this patient's condition and he remains on oxygen at 3 L 

with a pulse ox of 95%.  Swallowing adequately.  No further bouts of aspiration.

 Currently on IV Zosyn.  Currently on oral Lasix.  His post acute non-ST e

levation myocardial infarction.  He has a DNR/DNI CODE STATUS.  History of any 

chest pain and he is hemodynamically stable.  He has cardiomyopathy with 

impaired left ventricular ejection fraction post acute non-ST segment elevation 

myocardial infarction.  The wife is at 10.5 with a hemoglobin of 12.1 and a 

platelet count of 268.  Sodium is 134 with a BUN of 18 and a creatinine of 0.9. 

No significant blood work abnormalities.  





07/02/2022, condition is stable and the patient is looking to go back to nursing

home tomorrow.  He is a DNR/DNI CODE STATUS.  The patient got transferred out of

the intensive care unit yesterday as the patient was treated for aspiration 

pneumonia and CHF and acute non-ST segment elevation myocardial infarction.  

Medications remains unchanged.  The patient remains on 3 L of O2 nasal cannula. 

No signs of respiratory distress.  He is tolerating his diet.  He had breakfast 

this morning.  He is weak and lethargic.  For the most part is overall rested 

status is stable.














Objective





- Vital Signs


Vital signs: 


                                   Vital Signs











Temp  98.0 F   07/02/22 07:35


 


Pulse  76   07/02/22 11:12


 


Resp  18   07/02/22 07:35


 


BP  119/72   07/02/22 07:35


 


Pulse Ox  96   07/02/22 07:35


 


FiO2  30   07/02/22 04:19








                                 Intake & Output











 07/01/22 07/02/22 07/02/22





 18:59 06:59 18:59


 


Intake Total 520  


 


Output Total 0 200 


 


Balance 520 -200 


 


Weight  101 kg 


 


Intake:   


 


    


 


    Piperacillin-Tazobactam 3 100  





    .375 gm In Sodium   





    Chloride 0.9% 100 ml @ 25   





    mls/hr IVPB Q8HR [facility] Rx#   





    :[id number]   


 


    Sodium Chloride 0.9% 1, 70  





    000 ml @ 20 mls/hr IV .   





    Q24H JESSICA Rx#:[id number]   


 


  Oral 350  


 


Output:   


 


  Urine 0 200 


 


Other:   


 


  Voiding Method Incontinent  


 


  # Voids 1  


 


  # Bowel Movements 1  














- Exam








GENERAL EXAM: Alert, oriented 2, 77-year-old male patient, and breathing is 

less labored and the patient is currently on 5 L of O2 nasal cannula


HEAD: Normocephalic.


EYES: Normal reaction of pupils, equal size.


NOSE: Clear with pink turbinates.


THROAT: No erythema or exudates.


NECK: No masses, no JVD.


CHEST: No chest wall deformity.


LUNGS: Equal air entry with bilateral scattered rhonchi.  Crackles in lung bases

are still present


CVS: S1 and S2 normal with no audible murmur, regular rhythm.


ABDOMEN: No hepatosplenomegaly, normal bowel sounds, no guarding or rigidity.


SPINE: No scoliosis or deformity


SKIN: No rashes


CENTRAL NERVOUS SYSTEM: No focal deficits, tone is normal in all 4 extremities.


EXTREMITIES: There is no peripheral edema.  No clubbing, no cyanosis.  

Peripheral pulses are intact.








- Labs


CBC & Chem 7: 


                                 07/01/22 05:57





                                 07/01/22 05:57


Labs: 


                  Abnormal Lab Results - Last 24 Hours (Table)











  07/01/22 07/01/22 07/01/22 Range/Units





  11:53 16:59 20:28 


 


POC Glucose (mg/dL)  189 H  308 H  306 H  ()  mg/dL














  07/02/22 Range/Units





  07:09 


 


POC Glucose (mg/dL)  184 H  ()  mg/dL








                      Microbiology - Last 24 Hours (Table)











 06/25/22 16:01 Blood Culture - Final





 Blood    No Growth after 144 hours


 


 06/25/22 16:01 Blood Culture - Final





 Blood    No Growth after 144 hours














Assessment and Plan


Plan: 








Acute hypoxemic respiratory failure secondary to aspiration and acute pulmonary 

edema.  The patient was in severe respiratory distress when he arrived to the 

intensive care unit.  Supported by the BiPAP.  The patient was diuresed with IV 

Lasix.  The patient's was also covered with IV Zosyn.  He did suffer an acute 

non-ST segment elevation myocardial infarction.  Clinically improving and the 

patient is currently on 3 L of oxygen by nasal cannula.  His interval 

improvement in the chest x-ray findings special in the right lung base.  The 

patient is spending the night on BiPAP and in the morning he is on 3 L of oxygen

by nasal cannula.  Afebrile.  Hemodynamically stable.  He is post acute non-ST 

segment elevation myocardial infarction.





Acute aspiration on Zosyn





Acute pulmonary edema with acute systolic heart failure and an ejection fraction

of 25-30%





Acute non-ST segment elevation myocardial infarction and initial troponin was at

  13





Hyponatremia, recovered





History of CVA/TIA





Hyperlipidemia





Hypertension





Hypothyroidism





Benign prostatic hyperplasia





Diabetes mellitus





Nursing home resident





Altered mentation patient remains alert and oriented 2 with limited amount of 

communication skills, neurologically unchanged





Dysphagia with poor swallowing skills probably related to comorbidities and a 

previous stroke.  Swallow evaluation  needs to be done today





Plan





Patient got transferred out of the intensive care unit yesterday


Patient remains on 3 L of oxygen by nasal cannula


Patient was taken off the IV Zosyn and patient was placed on Augmentin


Continue same cardiac medications including aspirin, beta blockers with 

metoprolol 35 mg twice a day and the patient is also taking Lasix 20 mg by mouth

twice a day


 


DNR/DNI CODE STATUS


 


Dulcolax suppository to facilitate bowel movement activity


Transfer  to the nursing home after the medical team is agreeable.

## 2022-07-03 LAB
GLUCOSE BLD-MCNC: 163 MG/DL (ref 70–110)
GLUCOSE BLD-MCNC: 233 MG/DL (ref 70–110)
GLUCOSE BLD-MCNC: 280 MG/DL (ref 70–110)
GLUCOSE BLD-MCNC: 298 MG/DL (ref 70–110)

## 2022-07-03 RX ADMIN — FUROSEMIDE SCH MG: 20 TABLET ORAL at 20:24

## 2022-07-03 RX ADMIN — ATORVASTATIN CALCIUM SCH MG: 40 TABLET, FILM COATED ORAL at 09:21

## 2022-07-03 RX ADMIN — HEPARIN SODIUM SCH UNIT: 5000 INJECTION INTRAVENOUS; SUBCUTANEOUS at 09:20

## 2022-07-03 RX ADMIN — IPRATROPIUM BROMIDE AND ALBUTEROL SULFATE SCH ML: .5; 3 SOLUTION RESPIRATORY (INHALATION) at 19:32

## 2022-07-03 RX ADMIN — INSULIN ASPART SCH UNIT: 100 INJECTION, SOLUTION INTRAVENOUS; SUBCUTANEOUS at 13:35

## 2022-07-03 RX ADMIN — PANTOPRAZOLE SODIUM SCH MG: 40 TABLET, DELAYED RELEASE ORAL at 09:21

## 2022-07-03 RX ADMIN — IPRATROPIUM BROMIDE AND ALBUTEROL SULFATE SCH ML: .5; 3 SOLUTION RESPIRATORY (INHALATION) at 15:34

## 2022-07-03 RX ADMIN — IPRATROPIUM BROMIDE AND ALBUTEROL SULFATE SCH ML: .5; 3 SOLUTION RESPIRATORY (INHALATION) at 11:41

## 2022-07-03 RX ADMIN — ISOSORBIDE MONONITRATE SCH MG: 30 TABLET, EXTENDED RELEASE ORAL at 09:21

## 2022-07-03 RX ADMIN — TAMSULOSIN HYDROCHLORIDE SCH MG: 0.4 CAPSULE ORAL at 09:22

## 2022-07-03 RX ADMIN — IPRATROPIUM BROMIDE AND ALBUTEROL SULFATE SCH ML: .5; 3 SOLUTION RESPIRATORY (INHALATION) at 07:14

## 2022-07-03 RX ADMIN — SPIRONOLACTONE SCH MG: 25 TABLET, FILM COATED ORAL at 09:22

## 2022-07-03 RX ADMIN — CLOPIDOGREL BISULFATE SCH MG: 75 TABLET ORAL at 09:21

## 2022-07-03 RX ADMIN — THYROID, PORCINE SCH MG: 30 TABLET ORAL at 09:21

## 2022-07-03 RX ADMIN — HEPARIN SODIUM SCH UNIT: 5000 INJECTION INTRAVENOUS; SUBCUTANEOUS at 20:24

## 2022-07-03 RX ADMIN — CEFAZOLIN SCH: 330 INJECTION, POWDER, FOR SOLUTION INTRAMUSCULAR; INTRAVENOUS at 13:52

## 2022-07-03 RX ADMIN — INSULIN ASPART SCH UNIT: 100 INJECTION, SOLUTION INTRAVENOUS; SUBCUTANEOUS at 20:29

## 2022-07-03 RX ADMIN — INSULIN DETEMIR SCH UNIT: 100 INJECTION, SOLUTION SUBCUTANEOUS at 20:29

## 2022-07-03 RX ADMIN — FUROSEMIDE SCH MG: 20 TABLET ORAL at 09:21

## 2022-07-03 RX ADMIN — METOPROLOL TARTRATE SCH MG: 25 TABLET, FILM COATED ORAL at 09:22

## 2022-07-03 RX ADMIN — INSULIN ASPART SCH UNIT: 100 INJECTION, SOLUTION INTRAVENOUS; SUBCUTANEOUS at 18:06

## 2022-07-03 RX ADMIN — METOPROLOL TARTRATE SCH MG: 25 TABLET, FILM COATED ORAL at 20:24

## 2022-07-03 RX ADMIN — ASPIRIN 81 MG CHEWABLE TABLET SCH MG: 81 TABLET CHEWABLE at 09:21

## 2022-07-03 RX ADMIN — AMOXICILLIN AND CLAVULANATE POTASSIUM SCH EACH: 875; 125 TABLET, FILM COATED ORAL at 09:22

## 2022-07-03 RX ADMIN — LOSARTAN POTASSIUM SCH MG: 25 TABLET, FILM COATED ORAL at 20:24

## 2022-07-03 RX ADMIN — INSULIN ASPART SCH UNIT: 100 INJECTION, SOLUTION INTRAVENOUS; SUBCUTANEOUS at 09:20

## 2022-07-03 NOTE — P.PN
Subjective


Progress Note Date: 07/03/22








This is 77-year-old male patient from a [facility] care facility who has a history

of diabetes mellitus, CVA/TIA, GERD, hyperlipidemia, hypertension, 

hypothyroidism.  He was brought into the emergency room yesterday by EMS after 

choking while eating a sloppy Yusuf and french fries.  He was quite hypoxic at the

[facility] and when paramedics arrived patient's O2 saturation was in the 60s.  He is 

seen today in the intensive care unit.  He is sitting up.  Awake and alert.  

Somewhat slow to respond.  Oriented 2.  He is currently on 40% Ventimask.  No 

IV fluids.  He was initiated on Zosyn.  Chest x-ray reveals some bilateral 

reticular nodular infiltrates right greater than left.  White count 16.1.  

Hemoglobin 12.0.  Platelets 242.  Sodium 125.  Potassium 4.9.  Bicarb 20.  BUN 

18.  Creatinine 0.7.  Lactic acid 2.0.  AST 84.  ALT 23.  Glucose 242.








06/27/2022, I'm seeing this patient on a medical floor.  I was asked by the 

nursing staff to evaluate him as soon as possible as the patient developed acute

shortness of breath.  At the time of my arrival, the patient was in significant 

respiratory distress.  He was tachycardic, tachypneic, he had a congested cough,

diaphoretic, and was having labored breathing.  No further bouts of aspiration 

per the nursing staff.  The patient was admitted yesterday to the hospital 

because of an aspiration pneumonia started on IV Zosyn.  At that point, a workup

was initiated.  Chest x-ray was ordered that showed lower lobe pulmonary 

infiltrates and pulmonary vessel congestion.  The patient was given a dose of 

Lasix 40 mg IV.  The patient was given IV Solu-Medrol.  The patient was started 

on bronchodilators.  Following that, I started the patient on BiPAP at a pre

ssure of 10/5 cm of water with an FiO2 of 100% and the patient a chance to the 

intensive care unit with understanding that he has a DNR/DNI CODE STATUS.  The 

patient was later on evaluated in the ICU and the patient was already feeling 

better and diuresing.  EKG showed sinus tachycardia.  Troponin came back at 6 

and the patient suffered an acute non-ST segment elevation myocardial 

infarctions the patient will be seen by cardiology.  The white cell count of 

15.0 with hemoglobin 12.1.  The patient's sodium is up to 129, BUN is at 22 with

a creatinine of 0.9 and the potassium level of 4.6.  The proBNP level was 6690.





06/28/2022, the patient is in the intensive care unit.  Events from yesterday 

was noted and the patient obviously went into an acute pulmonary edema on top of

an aspiration pneumonia.  The patient was brought to the intensive care unit.  

The patient was supported by BiPAP.  The patient was diuresed.  Further 

investigation revealed that the patient suffered an acute non-ST segment 

elevation myocardial infarction.  Troponin peaked at 15.  Cardiac rhythm remains

sinus.  The patient got diuresed with IV Lasix.  Produced excellent urine output

and ultimately his breathing improved and the patient was taken off the BiPAP.  

This morning, the patient is on oxygen at 5 L nasal cannula.  The chest x-ray 

showing bilateral lower lobe pulmonary infiltrates in addition to pulmonary 

edema.  The patient remains on IV Zosyn.  Breathing remains slightly labored 

although is more comfortable.  His respiratory rate currently is in the mid 20s.

 The troponin trend as mentioned was up to 13.6 this morning.  His initial 

troponin was at 6.0.  Echocardiogram was repeated and the patient was found to 

have marked impairment of LV function.  The patient's ejection fraction was down

to 25-30%.  This was a suboptimal study due to the poor echo window.  

Nevertheless, LV function was severely diminished with an ejection fraction of 

25-30% and the apex was hypokinetic.  No other valvular abnormalities noted.  

Aortic valve was within normal limits.  Tricuspid valve and the mitral valves 

were also within normal limits.  On today's evaluation, the patient has a white 

cell count of 14.6 with a hemoglobin of 11.2.  Normal coagulation profile noted 

the patient is on IV heparin and the PTT currently it is therapeutic at 55.6.  

Blood sugar is at 163.  The rest of the electrolytes are still pending for now. 

Meanwhile, his fluid balance has been -2.4 L over the past 24 hours.  The patien

t remains on IV Zosyn.  The patient remains on IV Lasix 40 mg every 12 hours.  

The patient remains on IV heparin.  The patient is on Levemir insulin 10 units 

in addition to a sliding scale coverage.  Rest of the home medications of been 

ordered resume.  He is on aspirin.  He is on metoprolol 25 mg by mouth twice a 

day.  He is on no pressors for now.  Kennedy catheter is in place.  

Neurologically, he does communicate yet his communication is limited.  He is 

alert and oriented 2.  He is moving all 4 extremities.





29th 2022, neurologically the patient is unchanged.  Is able to answer questions

and follows simple commands.  He does have underlying dementia.  He also has 

underlying CVA along with various comorbidities.  The patient got transferred to

the intensive care unit after the patient developed acute hypoxic respiratory 

failure, CHF and bilateral pneumonias.  He also states suffered an acute ST 

segment elevation myocardial infarction.  Currently is on IV Zosyn.  Swallow 

evaluation is still in progress and this will be completed today.  Meanwhile, 

the patient is taking pured diet.  His chest x-ray from today is showing 

bilateral perihilar pulmonary infiltrates and lower lobe pleural effusions and 

consolidations.  This is a combination of CHF and aspiration pneumonia.  As 

mentioned, the patient remains on IV Zosyn.  The patient is also on IV Lasix 40 

mg every 12 hours.  Overall fluid balance over the past 24 hours has been -2.4 L

followed by a -1.2 L.  For now, the patient is on 5 L of oxygen by nasal 

cannula.  The white cells of 12.1 with a hemoglobin of 11.8.  The patient's BUN 

is at 28 with a creatinine of 1.02 and the sodium level is at 133.  The patient 

was on IV heparin for yesterday and the IV heparin was discontinued this 

morning.  Hemodynamically stable.  He remains on aspirin and Plavix.  He is also

on metoprolol 25 mg by mouth twice a day in addition to Aldactone.  Home 

medications of been all resume.  Kennedy cath is in place.  He does use the BiPAP 

overnight and currently is off the BiPAP and back on oxygen by nasal cannula.  

His echocardiogram revealed severe cardiomyopathy with an ejection fraction of 

25-30%.  I discussed the case with cardiology.  Based on his comorbidities and 

DNR/DNI CODE STATUS, we are going to treat his acute  STEMI medically.





06/30/2022, the patient is doing well.  The patient spent the night on a BiPAP 

at a pressure of 14/6 cm of water with an FiO2 of 30% and the patient is 

currently on 3 L.  Swallow evaluation was done and the patient was found to have

dysphagia class II and the patient was given nectar thick liquid material.  The 

chest x-ray showing some interval improvement with the reappearance and clearing

of the right hemidiaphragm.  The patient is currently on 3 L of oxygen by nasal 

cannula.  The patient was being diuresed with IV Lasix.  The patient was 

switched to oral Lasix by cardiology.  Input output balance has been -1.2 L over

the past 24 hours.  BUN is at 23 with a creatinine of 0.8 and a white cell count

of 8.5 with a hemoglobin of 10.8.  The patient is awake and interactive and 

communicating.  No fever.  No chills.  No other significant events overnight.





1 2022, no change in this patient's condition and he remains on oxygen at 3 L 

with a pulse ox of 95%.  Swallowing adequately.  No further bouts of aspiration.

 Currently on IV Zosyn.  Currently on oral Lasix.  His post acute non-ST e

levation myocardial infarction.  He has a DNR/DNI CODE STATUS.  History of any 

chest pain and he is hemodynamically stable.  He has cardiomyopathy with 

impaired left ventricular ejection fraction post acute non-ST segment elevation 

myocardial infarction.  The wife is at 10.5 with a hemoglobin of 12.1 and a 

platelet count of 268.  Sodium is 134 with a BUN of 18 and a creatinine of 0.9. 

No significant blood work abnormalities.  





07/02/2022, condition is stable and the patient is looking to go back to nursing

home tomorrow.  He is a DNR/DNI CODE STATUS.  The patient got transferred out of

the intensive care unit yesterday as the patient was treated for aspiration 

pneumonia and CHF and acute non-ST segment elevation myocardial infarction.  

Medications remains unchanged.  The patient remains on 3 L of O2 nasal cannula. 

No signs of respiratory distress.  He is tolerating his diet.  He had breakfast 

this morning.  He is weak and lethargic.  For the most part is overall rested 

status is stable.





7/3/22, I'm seeing the patient for a follow-up.  The patient is stable on the 

medical floor.  A breakfast this morning.  He has a DNR/DNI CODE STATUS and is 

awaiting transfer to nursing home.  He is on Augmentin for aspiration pneumonia.

 He is also on oral Lasix 20 mg 2 twice a day.  His post non-ST segment 

elevation myocardial infarction.  Remains on 3 L O2 nasal cannula.  DNR/DNI CODE

STATUS.





Objective





- Vital Signs


Vital signs: 


                                   Vital Signs











Temp  97.6 F   07/03/22 07:45


 


Pulse  64   07/03/22 07:45


 


Resp  17   07/03/22 07:45


 


BP  136/77   07/03/22 07:45


 


Pulse Ox  100   07/03/22 07:45


 


FiO2  30   07/03/22 07:12








                                 Intake & Output











 07/02/22 07/03/22 07/03/22





 18:59 06:59 18:59


 


Output Total 450 675 


 


Balance -450 -675 


 


Weight  100.5 kg 


 


Output:   


 


  Urine 450 675 


 


    Uretheral (Kennedy)  475 


 


Other:   


 


  Voiding Method   External Catheter


 


  # Bowel Movements 1 1 














- Exam








GENERAL EXAM: Alert, oriented 2, 77-year-old male patient, and breathing is 

less labored and the patient is currently on 5 L of O2 nasal cannula


HEAD: Normocephalic.


EYES: Normal reaction of pupils, equal size.


NOSE: Clear with pink turbinates.


THROAT: No erythema or exudates.


NECK: No masses, no JVD.


CHEST: No chest wall deformity.


LUNGS: Equal air entry with bilateral scattered rhonchi.  Crackles in lung bases

are still present


CVS: S1 and S2 normal with no audible murmur, regular rhythm.


ABDOMEN: No hepatosplenomegaly, normal bowel sounds, no guarding or rigidity.


SPINE: No scoliosis or deformity


SKIN: No rashes


CENTRAL NERVOUS SYSTEM: No focal deficits, tone is normal in all 4 extremities.


EXTREMITIES: There is no peripheral edema.  No clubbing, no cyanosis.  Periphera

l pulses are intact.








- Labs


CBC & Chem 7: 


                                 07/02/22 06:53





                                 07/02/22 06:53


Labs: 


                  Abnormal Lab Results - Last 24 Hours (Table)











  07/02/22 07/02/22 07/02/22 Range/Units





  06:53 16:46 20:11 


 


Sodium  133 L    (135-145)  mmol/L


 


Carbon Dioxide  27.8 H    (20.0-27.5)  mmol/L


 


Anion Gap  8.20 L    (10.00-18.00)  mmol/L


 


Glucose  183 H    ()  mg/dL


 


POC Glucose (mg/dL)   257 H  281 H  ()  mg/dL














  07/03/22 07/03/22 Range/Units





  07:39 11:13 


 


Sodium    (135-145)  mmol/L


 


Carbon Dioxide    (20.0-27.5)  mmol/L


 


Anion Gap    (10.00-18.00)  mmol/L


 


Glucose    ()  mg/dL


 


POC Glucose (mg/dL)  163 H  233 H  ()  mg/dL














Assessment and Plan


Plan: 








Acute hypoxemic respiratory failure secondary to aspiration and acute pulmonary 

edema.  The patient was in severe respiratory distress when he arrived to the 

intensive care unit.  Supported by the BiPAP.  The patient was diuresed with IV 

Lasix.  The patient's was also covered with IV Zosyn.  He did suffer an acute 

non-ST segment elevation myocardial infarction.  Clinically improving and the 

patient is currently on 3 L of oxygen by nasal cannula.  His interval 

improvement in the chest x-ray findings special in the right lung base.  The 

patient is on 3 L of oxygen nasal cannula





Acute aspiration on oral Augmentin





Acute pulmonary edema with acute systolic heart failure and an ejection fraction

of 25-30%, improved and the patient has been diuresed and the patient is 

currently on oral Lasix





Acute non-ST segment elevation myocardial infarction and initial troponin was at

  13





Hyponatremia, recovered





History of CVA/TIA





Hyperlipidemia





Hypertension





Hypothyroidism





Benign prostatic hyperplasia





Diabetes mellitus





Nursing home resident





Altered mentation patient remains alert and oriented 2 with limited amount of 

communication skills, neurologically unchanged





Dysphagia with poor swallowing skills probably related to comorbidities and a 

previous stroke.  Swallow evaluation was completed





Plan





Clinically unchanged and stable


Awaiting transfer to nursing home


Complete a course of oral Augmentin


Keep the patient on diuretics


Continue same cardiac medications including aspirin, beta blockers with 

metoprolol 35 mg twice a day and the patient is also taking Lasix 20 mg by mouth

twice a day


 


DNR/DNI CODE STATUS

## 2022-07-03 NOTE — P.PN
Subjective


Progress Note Date: 07/03/22


Principal diagnosis: 





CC: Shortness of breath





Patient appears lethargic he is however following commands.  No acute issues 

overnight.





Objective





- Vital Signs


Vital signs: 


                                   Vital Signs











Temp  97.6 F   07/03/22 07:45


 


Pulse  72   07/03/22 11:57


 


Resp  18   07/03/22 11:57


 


BP  136/77   07/03/22 07:45


 


Pulse Ox  100   07/03/22 07:45


 


FiO2  30   07/03/22 07:12








                                 Intake & Output











 07/02/22 07/03/22 07/03/22





 18:59 06:59 18:59


 


Output Total 450 675 


 


Balance -450 -675 


 


Weight  100.5 kg 


 


Output:   


 


  Urine 450 675 


 


    Uretheral (Kennedy)  475 


 


Other:   


 


  Voiding Method   External Catheter


 


  # Bowel Movements 1 1 














- Exam





General examination - Alert  and Oriented 3 in NAD, lethargic


Heart - + S1S2  no murmurs


Lungs - diminished breath sounds bilaterally, no wheezing on exam


Abdomen soft NT ND +ve BS


Extremities - trace bilateral pitting edema


CNS - Moving all 4 extremities spontaneously, following simple commands


Psych - Calm and cooperative, mildly confused





- Labs


CBC & Chem 7: 


                                 07/02/22 06:53





                                 07/02/22 06:53


Labs: 


                  Abnormal Lab Results - Last 24 Hours (Table)











  07/02/22 07/02/22 07/03/22 Range/Units





  16:46 20:11 07:39 


 


POC Glucose (mg/dL)  257 H  281 H  163 H  ()  mg/dL














  07/03/22 Range/Units





  11:13 


 


POC Glucose (mg/dL)  233 H  ()  mg/dL














Assessment and Plan


Assessment: 





Acute hypoxic respiratory failure secondary to aspiration acute pulmonary edema


Acute systolic heart failure with an ejection fraction 25-30%


Aspiration pneumonia


COPD


-Patient off BiPAP and on 3 L nasal cannula


-Patient transitioned to oral Lasix


-Resume Aldactone


-Patient on losartan and metoprolol


-Patient transition to oral antibiotics with Augmentin


-Resume DuoNeb


-Cardiology and pulmonology on board


-Speech eval -> clear patient for dysphagia 2 diet with nectar thick liquid.  

Patient passed barium swallow study








Non-ST segment elevation myocardial infarction


- Resume aspirin metoprolol Plavix and statin


-Patient transition from heparin drip to subcu heparin


-Cardiology discussed the patient who said he did not want any aggressive 

measures.





Hypervolemic Hyponatremia


-Improving on diuretics





History of CVA/TIA


-Resume aspirin and statin





Hypertension


-Resume losartan, metoprolol





Hypothyroidism


-Resume [place] Thyroid





Benign prostatic hyperplasia


-Resume Flomax





Consult PT OT -> patient to return back to skilled nursing facility





Diabetes mellitus


-Sliding-scale insulin


CODE STATUS: DNR/DNI


Patient has a poor baseline.  His prognosis is guarded.


Anticipated discharge: Patient medically stable for discharge.  Awaiting for 

prior authorization.

## 2022-07-03 NOTE — P.PN
Subjective


Progress Note Date: 07/03/22


This is a 77-year-old gentleman with a history of diabetes, hypertension 

hyperlipidemia, prior stroke.  Presented with aspiration pneumonia.  Was noted 

to have elevated troponins.  Echocardiogram showed severely impaired systolic 

function with apical hypokinesis.  He is a DO NOT RESUSCITATE.  He is currently 

on MedSurg floor he is Hemoccult hemodynamically stable.  He is somewhat 

lethargic this morning.  Planning for discharge to skilled nursing facility 

hopefully today.  They're apparently waiting for prior authorization.  He is 

maintaining sinus mechanism.  He continues to be on Augmentin, aspirin, Lipitor,

Plavix, Cymbalta, Lasix, insulin, isosorbide, losartan, metoprolol, 

spironolactone, Flomax, thyroid and Ultram.  Vital signs are stable.





7/3/22


The patient was seen and examined resting comfortably in bed.  He is more alert 

today.  Complains of feeling that his breathing is "thick".  Complains of chest 

tenderness to palpation.  No signs are stable.  Awaiting prior authorization 

transfer to skilled nursing facility.








Objective





- Vital Signs


Vital signs: 


                                   Vital Signs











Temp  98.4 F   07/03/22 13:51


 


Pulse  79   07/03/22 13:51


 


Resp  17   07/03/22 13:51


 


BP  124/67   07/03/22 13:51


 


Pulse Ox  98   07/03/22 13:51


 


FiO2  30   07/03/22 07:12








                                 Intake & Output











 07/02/22 07/03/22 07/03/22





 18:59 06:59 18:59


 


Output Total 450 675 


 


Balance -450 -675 


 


Weight  100.5 kg 


 


Output:   


 


  Urine 450 675 


 


    Uretheral (Kennedy)  475 


 


Other:   


 


  Voiding Method   External Catheter


 


  # Bowel Movements 1 1 














- Exam


PHYSICAL EXAMINATION: 





HEENT: Head is atraumatic, normocephalic.  Pupils equal, round.  Neck is supple.

 There is no elevated jugular venous pressure.





HEART EXAMINATION: Heart sounds regular, S1 and S2 normal with a systolic 

ejection murmur.





CHEST EXAMINATION: Lungs are diminished to auscultation. No chest wall 

tenderness is noted on palpation or with deep breathing.





ABDOMEN:  Soft, nontender. Bowel sounds are heard. No organomegaly noted.


 


EXTREMITIES: No evidence of peripheral edema and no calf tenderness noted.











- Labs


CBC & Chem 7: 


                                 07/02/22 06:53





                                 07/02/22 06:53


Labs: 


                  Abnormal Lab Results - Last 24 Hours (Table)











  07/02/22 07/02/22 07/03/22 Range/Units





  16:46 20:11 07:39 


 


POC Glucose (mg/dL)  257 H  281 H  163 H  ()  mg/dL














  07/03/22 Range/Units





  11:13 


 


POC Glucose (mg/dL)  233 H  ()  mg/dL














Assessment and Plan


Assessment: 


1.  Acute pulmonary edema, resolved


2.  Non-STEMI


3.  Aspiration pneumonia


4.  History of CVA and element of dementia


5.  Hypertension


6.  Hyperlipidemia


7.  Diabetes 





Plan: 


From cardiology's perspective medications were reviewed and we will continue the

same.  We anticipate patient to be discharged to skilled nursing facility soon. 

At this time we'll follow the patient on as-needed basis.  Please do not 

hesitate to contact us with questions.





NP note has been reviewed, I agree with a documented findings and plan of care. 

Patient was seen and examined.

## 2022-07-04 LAB
GLUCOSE BLD-MCNC: 210 MG/DL (ref 70–110)
GLUCOSE BLD-MCNC: 238 MG/DL (ref 70–110)
GLUCOSE BLD-MCNC: 333 MG/DL (ref 70–110)
GLUCOSE BLD-MCNC: 358 MG/DL (ref 70–110)

## 2022-07-04 RX ADMIN — SPIRONOLACTONE SCH MG: 25 TABLET, FILM COATED ORAL at 08:36

## 2022-07-04 RX ADMIN — THYROID, PORCINE SCH MG: 30 TABLET ORAL at 08:36

## 2022-07-04 RX ADMIN — IPRATROPIUM BROMIDE AND ALBUTEROL SULFATE SCH ML: .5; 3 SOLUTION RESPIRATORY (INHALATION) at 21:40

## 2022-07-04 RX ADMIN — AMOXICILLIN AND CLAVULANATE POTASSIUM SCH EACH: 875; 125 TABLET, FILM COATED ORAL at 08:37

## 2022-07-04 RX ADMIN — FUROSEMIDE SCH MG: 20 TABLET ORAL at 21:22

## 2022-07-04 RX ADMIN — INSULIN ASPART SCH UNIT: 100 INJECTION, SOLUTION INTRAVENOUS; SUBCUTANEOUS at 21:22

## 2022-07-04 RX ADMIN — INSULIN ASPART SCH UNIT: 100 INJECTION, SOLUTION INTRAVENOUS; SUBCUTANEOUS at 12:17

## 2022-07-04 RX ADMIN — ISOSORBIDE MONONITRATE SCH MG: 30 TABLET, EXTENDED RELEASE ORAL at 08:37

## 2022-07-04 RX ADMIN — LOSARTAN POTASSIUM SCH MG: 25 TABLET, FILM COATED ORAL at 21:22

## 2022-07-04 RX ADMIN — CEFAZOLIN SCH: 330 INJECTION, POWDER, FOR SOLUTION INTRAMUSCULAR; INTRAVENOUS at 12:18

## 2022-07-04 RX ADMIN — METOPROLOL TARTRATE SCH MG: 25 TABLET, FILM COATED ORAL at 08:37

## 2022-07-04 RX ADMIN — HEPARIN SODIUM SCH UNIT: 5000 INJECTION INTRAVENOUS; SUBCUTANEOUS at 08:36

## 2022-07-04 RX ADMIN — INSULIN ASPART SCH UNIT: 100 INJECTION, SOLUTION INTRAVENOUS; SUBCUTANEOUS at 17:12

## 2022-07-04 RX ADMIN — CLOPIDOGREL BISULFATE SCH MG: 75 TABLET ORAL at 08:37

## 2022-07-04 RX ADMIN — PANTOPRAZOLE SODIUM SCH MG: 40 TABLET, DELAYED RELEASE ORAL at 09:16

## 2022-07-04 RX ADMIN — IPRATROPIUM BROMIDE AND ALBUTEROL SULFATE SCH ML: .5; 3 SOLUTION RESPIRATORY (INHALATION) at 11:06

## 2022-07-04 RX ADMIN — METOPROLOL TARTRATE SCH MG: 25 TABLET, FILM COATED ORAL at 21:22

## 2022-07-04 RX ADMIN — ASPIRIN 81 MG CHEWABLE TABLET SCH MG: 81 TABLET CHEWABLE at 08:37

## 2022-07-04 RX ADMIN — ATORVASTATIN CALCIUM SCH MG: 40 TABLET, FILM COATED ORAL at 08:37

## 2022-07-04 RX ADMIN — IPRATROPIUM BROMIDE AND ALBUTEROL SULFATE SCH ML: .5; 3 SOLUTION RESPIRATORY (INHALATION) at 16:38

## 2022-07-04 RX ADMIN — TAMSULOSIN HYDROCHLORIDE SCH MG: 0.4 CAPSULE ORAL at 08:36

## 2022-07-04 RX ADMIN — HEPARIN SODIUM SCH UNIT: 5000 INJECTION INTRAVENOUS; SUBCUTANEOUS at 21:22

## 2022-07-04 RX ADMIN — INSULIN DETEMIR SCH UNIT: 100 INJECTION, SOLUTION SUBCUTANEOUS at 21:23

## 2022-07-04 RX ADMIN — FUROSEMIDE SCH MG: 20 TABLET ORAL at 08:36

## 2022-07-04 RX ADMIN — INSULIN ASPART SCH UNIT: 100 INJECTION, SOLUTION INTRAVENOUS; SUBCUTANEOUS at 08:36

## 2022-07-04 RX ADMIN — IPRATROPIUM BROMIDE AND ALBUTEROL SULFATE SCH ML: .5; 3 SOLUTION RESPIRATORY (INHALATION) at 07:23

## 2022-07-04 NOTE — P.PN
Subjective


Progress Note Date: 07/04/22


Principal diagnosis: 





CC: Shortness of breath





Patient stated that he is not eating much.  There has some thickened apple juice

at bedside and recommended patient to drink and however patient stated that he 

does not like apple juice.  Patient appeared lethargic however is following 

commands.  He has a poor baseline.





Objective





- Vital Signs


Vital signs: 


                                   Vital Signs











Temp  97.7 F   07/04/22 07:45


 


Pulse  76   07/04/22 11:19


 


Resp  17   07/04/22 08:25


 


BP  142/72   07/04/22 07:45


 


Pulse Ox  95   07/04/22 07:45


 


FiO2  30   07/03/22 07:12








                                 Intake & Output











 07/03/22 07/04/22 07/04/22





 18:59 06:59 18:59


 


Output Total 450  


 


Balance -450  


 


Weight  100 kg 


 


Output:   


 


  Urine 450  


 


Other:   


 


  Voiding Method External Catheter External Catheter External Catheter


 


  # Voids  2 














- Exam





General examination - Alert  and Oriented 3 in NAD, lethargic


Heart - + S1S2  no murmurs


Lungs - diminished breath sounds bilaterally, no wheezing on exam


Abdomen soft NT ND +ve BS


Extremities - trace bilateral pitting edema


CNS - Moving all 4 extremities spontaneously, following simple commands


Psych - Calm and cooperative, mildly confused





- Labs


CBC & Chem 7: 


                                 07/02/22 06:53





                                 07/02/22 06:53


Labs: 


                  Abnormal Lab Results - Last 24 Hours (Table)











  07/03/22 07/03/22 07/04/22 Range/Units





  16:26 19:59 06:42 


 


POC Glucose (mg/dL)  280 H  298 H  210 H  ()  mg/dL














  07/04/22 Range/Units





  11:29 


 


POC Glucose (mg/dL)  238 H  ()  mg/dL














Assessment and Plan


Assessment: 





Acute hypoxic respiratory failure secondary to aspiration acute pulmonary edema


Acute systolic heart failure with an ejection fraction 25-30%


Aspiration pneumonia


COPD


-Patient off BiPAP and on 3 L nasal cannula


-Patient transitioned to oral Lasix


-Resume Aldactone


-Patient on losartan and metoprolol


-Patient transition to oral antibiotics with Augmentin


-Resume DuoNeb


-Cardiology and pulmonology on board


-Speech eval -> clear patient for dysphagia 2 diet with nectar thick liquid.  

Patient passed barium swallow study








Non-ST segment elevation myocardial infarction


- Resume aspirin metoprolol Plavix and statin


-Patient transition from heparin drip to subcu heparin


-Cardiology discussed the patient who said he did not want any aggressive 

measures.





Hypervolemic Hyponatremia


-Improving on diuretics





History of CVA/TIA


-Resume aspirin and statin





Hypertension


-Resume losartan, metoprolol





Hypothyroidism


-Resume [place] Thyroid





Benign prostatic hyperplasia


-Resume Flomax





Consult PT OT -> patient to return back to skilled nursing facility





Diabetes mellitus


-Sliding-scale insulin


CODE STATUS: DNR/DNI


Patient has a poor baseline.  His prognosis is guarded.


Anticipated discharge: Patient medically stable for discharge.  Awaiting for 

prior authorization.

## 2022-07-04 NOTE — P.PN
Subjective


Progress Note Date: 07/04/22








This is 77-year-old male patient from a [facility] care facility who has a history

of diabetes mellitus, CVA/TIA, GERD, hyperlipidemia, hypertension, 

hypothyroidism.  He was brought into the emergency room yesterday by EMS after 

choking while eating a sloppy Yusuf and french fries.  He was quite hypoxic at the

[facility] and when paramedics arrived patient's O2 saturation was in the 60s.  He is 

seen today in the intensive care unit.  He is sitting up.  Awake and alert.  

Somewhat slow to respond.  Oriented 2.  He is currently on 40% Ventimask.  No 

IV fluids.  He was initiated on Zosyn.  Chest x-ray reveals some bilateral 

reticular nodular infiltrates right greater than left.  White count 16.1.  

Hemoglobin 12.0.  Platelets 242.  Sodium 125.  Potassium 4.9.  Bicarb 20.  BUN 

18.  Creatinine 0.7.  Lactic acid 2.0.  AST 84.  ALT 23.  Glucose 242.








06/27/2022, I'm seeing this patient on a medical floor.  I was asked by the 

nursing staff to evaluate him as soon as possible as the patient developed acute

shortness of breath.  At the time of my arrival, the patient was in significant 

respiratory distress.  He was tachycardic, tachypneic, he had a congested cough,

diaphoretic, and was having labored breathing.  No further bouts of aspiration 

per the nursing staff.  The patient was admitted yesterday to the hospital 

because of an aspiration pneumonia started on IV Zosyn.  At that point, a workup

was initiated.  Chest x-ray was ordered that showed lower lobe pulmonary 

infiltrates and pulmonary vessel congestion.  The patient was given a dose of 

Lasix 40 mg IV.  The patient was given IV Solu-Medrol.  The patient was started 

on bronchodilators.  Following that, I started the patient on BiPAP at a pre

ssure of 10/5 cm of water with an FiO2 of 100% and the patient a chance to the 

intensive care unit with understanding that he has a DNR/DNI CODE STATUS.  The 

patient was later on evaluated in the ICU and the patient was already feeling 

better and diuresing.  EKG showed sinus tachycardia.  Troponin came back at 6 

and the patient suffered an acute non-ST segment elevation myocardial 

infarctions the patient will be seen by cardiology.  The white cell count of 

15.0 with hemoglobin 12.1.  The patient's sodium is up to 129, BUN is at 22 with

a creatinine of 0.9 and the potassium level of 4.6.  The proBNP level was 6690.





06/28/2022, the patient is in the intensive care unit.  Events from yesterday 

was noted and the patient obviously went into an acute pulmonary edema on top of

an aspiration pneumonia.  The patient was brought to the intensive care unit.  

The patient was supported by BiPAP.  The patient was diuresed.  Further 

investigation revealed that the patient suffered an acute non-ST segment 

elevation myocardial infarction.  Troponin peaked at 15.  Cardiac rhythm remains

sinus.  The patient got diuresed with IV Lasix.  Produced excellent urine output

and ultimately his breathing improved and the patient was taken off the BiPAP.  

This morning, the patient is on oxygen at 5 L nasal cannula.  The chest x-ray 

showing bilateral lower lobe pulmonary infiltrates in addition to pulmonary 

edema.  The patient remains on IV Zosyn.  Breathing remains slightly labored 

although is more comfortable.  His respiratory rate currently is in the mid 20s.

 The troponin trend as mentioned was up to 13.6 this morning.  His initial 

troponin was at 6.0.  Echocardiogram was repeated and the patient was found to 

have marked impairment of LV function.  The patient's ejection fraction was down

to 25-30%.  This was a suboptimal study due to the poor echo window.  

Nevertheless, LV function was severely diminished with an ejection fraction of 

25-30% and the apex was hypokinetic.  No other valvular abnormalities noted.  

Aortic valve was within normal limits.  Tricuspid valve and the mitral valves 

were also within normal limits.  On today's evaluation, the patient has a white 

cell count of 14.6 with a hemoglobin of 11.2.  Normal coagulation profile noted 

the patient is on IV heparin and the PTT currently it is therapeutic at 55.6.  

Blood sugar is at 163.  The rest of the electrolytes are still pending for now. 

Meanwhile, his fluid balance has been -2.4 L over the past 24 hours.  The patien

t remains on IV Zosyn.  The patient remains on IV Lasix 40 mg every 12 hours.  

The patient remains on IV heparin.  The patient is on Levemir insulin 10 units 

in addition to a sliding scale coverage.  Rest of the home medications of been 

ordered resume.  He is on aspirin.  He is on metoprolol 25 mg by mouth twice a 

day.  He is on no pressors for now.  Kennedy catheter is in place.  

Neurologically, he does communicate yet his communication is limited.  He is 

alert and oriented 2.  He is moving all 4 extremities.





29th 2022, neurologically the patient is unchanged.  Is able to answer questions

and follows simple commands.  He does have underlying dementia.  He also has 

underlying CVA along with various comorbidities.  The patient got transferred to

the intensive care unit after the patient developed acute hypoxic respiratory 

failure, CHF and bilateral pneumonias.  He also states suffered an acute ST 

segment elevation myocardial infarction.  Currently is on IV Zosyn.  Swallow 

evaluation is still in progress and this will be completed today.  Meanwhile, 

the patient is taking pured diet.  His chest x-ray from today is showing 

bilateral perihilar pulmonary infiltrates and lower lobe pleural effusions and 

consolidations.  This is a combination of CHF and aspiration pneumonia.  As 

mentioned, the patient remains on IV Zosyn.  The patient is also on IV Lasix 40 

mg every 12 hours.  Overall fluid balance over the past 24 hours has been -2.4 L

followed by a -1.2 L.  For now, the patient is on 5 L of oxygen by nasal 

cannula.  The white cells of 12.1 with a hemoglobin of 11.8.  The patient's BUN 

is at 28 with a creatinine of 1.02 and the sodium level is at 133.  The patient 

was on IV heparin for yesterday and the IV heparin was discontinued this 

morning.  Hemodynamically stable.  He remains on aspirin and Plavix.  He is also

on metoprolol 25 mg by mouth twice a day in addition to Aldactone.  Home 

medications of been all resume.  Kennedy cath is in place.  He does use the BiPAP 

overnight and currently is off the BiPAP and back on oxygen by nasal cannula.  

His echocardiogram revealed severe cardiomyopathy with an ejection fraction of 

25-30%.  I discussed the case with cardiology.  Based on his comorbidities and 

DNR/DNI CODE STATUS, we are going to treat his acute  STEMI medically.





06/30/2022, the patient is doing well.  The patient spent the night on a BiPAP 

at a pressure of 14/6 cm of water with an FiO2 of 30% and the patient is 

currently on 3 L.  Swallow evaluation was done and the patient was found to have

dysphagia class II and the patient was given nectar thick liquid material.  The 

chest x-ray showing some interval improvement with the reappearance and clearing

of the right hemidiaphragm.  The patient is currently on 3 L of oxygen by nasal 

cannula.  The patient was being diuresed with IV Lasix.  The patient was 

switched to oral Lasix by cardiology.  Input output balance has been -1.2 L over

the past 24 hours.  BUN is at 23 with a creatinine of 0.8 and a white cell count

of 8.5 with a hemoglobin of 10.8.  The patient is awake and interactive and 

communicating.  No fever.  No chills.  No other significant events overnight.





1 2022, no change in this patient's condition and he remains on oxygen at 3 L 

with a pulse ox of 95%.  Swallowing adequately.  No further bouts of aspiration.

 Currently on IV Zosyn.  Currently on oral Lasix.  His post acute non-ST e

levation myocardial infarction.  He has a DNR/DNI CODE STATUS.  History of any 

chest pain and he is hemodynamically stable.  He has cardiomyopathy with 

impaired left ventricular ejection fraction post acute non-ST segment elevation 

myocardial infarction.  The wife is at 10.5 with a hemoglobin of 12.1 and a 

platelet count of 268.  Sodium is 134 with a BUN of 18 and a creatinine of 0.9. 

No significant blood work abnormalities.  





07/02/2022, condition is stable and the patient is looking to go back to nursing

home tomorrow.  He is a DNR/DNI CODE STATUS.  The patient got transferred out of

the intensive care unit yesterday as the patient was treated for aspiration 

pneumonia and CHF and acute non-ST segment elevation myocardial infarction.  

Medications remains unchanged.  The patient remains on 3 L of O2 nasal cannula. 

No signs of respiratory distress.  He is tolerating his diet.  He had breakfast 

this morning.  He is weak and lethargic.  For the most part is overall rested 

status is stable.





7/3/22, I'm seeing the patient for a follow-up.  The patient is stable on the 

medical floor.  A breakfast this morning.  He has a DNR/DNI CODE STATUS and is 

awaiting transfer to nursing home.  He is on Augmentin for aspiration pneumonia.

 He is also on oral Lasix 20 mg 2 twice a day.  His post non-ST segment 

elevation myocardial infarction.  Remains on 3 L O2 nasal cannula.  DNR/DNI CODE

STATUS.





07/04/2022, the patient's condition stabilized is still awakening transferred to

the nursing home.  No new complaints otherwise for now.  The same treatment is 

being offered still on this patient..  He remains on oxygen 3 L per minute nasal

cannula.  No aspiration.





Objective





- Vital Signs


Vital signs: 


                                   Vital Signs











Temp  97.7 F   07/04/22 07:45


 


Pulse  76   07/04/22 11:19


 


Resp  17   07/04/22 08:25


 


BP  142/72   07/04/22 07:45


 


Pulse Ox  95   07/04/22 07:45


 


FiO2  30   07/03/22 07:12








                                 Intake & Output











 07/03/22 07/04/22 07/04/22





 18:59 06:59 18:59


 


Output Total 450  


 


Balance -450  


 


Weight  100 kg 


 


Output:   


 


  Urine 450  


 


Other:   


 


  Voiding Method External Catheter External Catheter External Catheter


 


  # Voids  2 














- Exam








GENERAL EXAM: Alert, oriented 2, 77-year-old male patient, and breathing is 

less labored and the patient is currently on 5 L of O2 nasal cannula


HEAD: Normocephalic.


EYES: Normal reaction of pupils, equal size.


NOSE: Clear with pink turbinates.


THROAT: No erythema or exudates.


NECK: No masses, no JVD.


CHEST: No chest wall deformity.


LUNGS: Equal air entry with bilateral scattered rhonchi.  Crackles in lung bases

are still present


CVS: S1 and S2 normal with no audible murmur, regular rhythm.


ABDOMEN: No hepatosplenomegaly, normal bowel sounds, no guarding or rigidity.


SPINE: No scoliosis or deformity


SKIN: No rashes


CENTRAL NERVOUS SYSTEM: No focal deficits, tone is normal in all 4 extremities.


EXTREMITIES: There is no peripheral edema.  No clubbing, no cyanosis.  

Peripheral pulses are intact.








- Labs


CBC & Chem 7: 


                                 07/02/22 06:53





                                 07/02/22 06:53


Labs: 


                  Abnormal Lab Results - Last 24 Hours (Table)











  07/03/22 07/03/22 07/04/22 Range/Units





  16:26 19:59 06:42 


 


POC Glucose (mg/dL)  280 H  298 H  210 H  ()  mg/dL














  07/04/22 Range/Units





  11:29 


 


POC Glucose (mg/dL)  238 H  ()  mg/dL














Assessment and Plan


Plan: 








Acute hypoxemic respiratory failure secondary to aspiration and acute pulmonary 

edema.  The patient was in severe respiratory distress when he arrived to the 

intensive care unit.  Supported by the BiPAP.  The patient was diuresed with IV 

Lasix.  The patient's was also covered with IV Zosyn.  He did suffer an acute 

non-ST segment elevation myocardial infarction.  Clinically improving and the 

patient is currently on 3 L of oxygen by nasal cannula.  His interval 

improvement in the chest x-ray findings special in the right lung base.  The 

patient is on 3 L of oxygen nasal cannula





Acute aspiration on oral Augmentin





Acute pulmonary edema with acute systolic heart failure and an ejection fraction

of 25-30%, improved and the patient has been diuresed and the patient is curr

ently on oral Lasix





Acute non-ST segment elevation myocardial infarction and initial troponin was at

  13





Hyponatremia, recovered





History of CVA/TIA





Hyperlipidemia





Hypertension





Hypothyroidism





Benign prostatic hyperplasia





Diabetes mellitus





Nursing home resident





Altered mentation patient remains alert and oriented 2 with limited amount of 

communication skills, neurologically unchanged





Dysphagia with poor swallowing skills probably related to comorbidities and a 

previous stroke.  Swallow evaluation was completed





Plan





Awaiting transfer to nursing home


Continue same treatment


No active pulmonary issues over going to sign off the case


 


DNR/DNI CODE STATUS

## 2022-07-05 LAB
ANION GAP SERPL CALC-SCNC: 8 MMOL/L
BUN SERPL-SCNC: 14 MG/DL (ref 9–20)
CALCIUM SPEC-MCNC: 8 MG/DL (ref 8.4–10.2)
CHLORIDE SERPL-SCNC: 96 MMOL/L (ref 98–107)
CO2 SERPL-SCNC: 25 MMOL/L (ref 22–30)
ERYTHROCYTE [DISTWIDTH] IN BLOOD BY AUTOMATED COUNT: 3.65 M/UL (ref 4.3–5.9)
ERYTHROCYTE [DISTWIDTH] IN BLOOD: 13.6 % (ref 11.5–15.5)
GLUCOSE BLD-MCNC: 199 MG/DL (ref 70–110)
GLUCOSE BLD-MCNC: 220 MG/DL (ref 70–110)
GLUCOSE BLD-MCNC: 267 MG/DL (ref 70–110)
GLUCOSE BLD-MCNC: 301 MG/DL (ref 70–110)
GLUCOSE SERPL-MCNC: 195 MG/DL (ref 74–99)
HCT VFR BLD AUTO: 33.9 % (ref 39–53)
HGB BLD-MCNC: 11.5 GM/DL (ref 13–17.5)
MAGNESIUM SPEC-SCNC: 1.9 MG/DL (ref 1.6–2.3)
MCH RBC QN AUTO: 31.4 PG (ref 25–35)
MCHC RBC AUTO-ENTMCNC: 33.7 G/DL (ref 31–37)
MCV RBC AUTO: 92.9 FL (ref 80–100)
PLATELET # BLD AUTO: 270 K/UL (ref 150–450)
POTASSIUM SERPL-SCNC: 4.1 MMOL/L (ref 3.5–5.1)
SODIUM SERPL-SCNC: 129 MMOL/L (ref 137–145)
WBC # BLD AUTO: 12.4 K/UL (ref 3.8–10.6)

## 2022-07-05 RX ADMIN — THYROID, PORCINE SCH MG: 30 TABLET ORAL at 10:27

## 2022-07-05 RX ADMIN — IPRATROPIUM BROMIDE AND ALBUTEROL SULFATE SCH ML: .5; 3 SOLUTION RESPIRATORY (INHALATION) at 08:26

## 2022-07-05 RX ADMIN — LOSARTAN POTASSIUM SCH MG: 25 TABLET, FILM COATED ORAL at 21:24

## 2022-07-05 RX ADMIN — HEPARIN SODIUM SCH UNIT: 5000 INJECTION INTRAVENOUS; SUBCUTANEOUS at 22:11

## 2022-07-05 RX ADMIN — METOPROLOL TARTRATE SCH MG: 25 TABLET, FILM COATED ORAL at 10:14

## 2022-07-05 RX ADMIN — IPRATROPIUM BROMIDE AND ALBUTEROL SULFATE SCH ML: .5; 3 SOLUTION RESPIRATORY (INHALATION) at 12:20

## 2022-07-05 RX ADMIN — ISOSORBIDE MONONITRATE SCH MG: 30 TABLET, EXTENDED RELEASE ORAL at 10:15

## 2022-07-05 RX ADMIN — SPIRONOLACTONE SCH MG: 25 TABLET, FILM COATED ORAL at 10:18

## 2022-07-05 RX ADMIN — HEPARIN SODIUM SCH UNIT: 5000 INJECTION INTRAVENOUS; SUBCUTANEOUS at 10:30

## 2022-07-05 RX ADMIN — ASPIRIN 81 MG CHEWABLE TABLET SCH MG: 81 TABLET CHEWABLE at 10:17

## 2022-07-05 RX ADMIN — CEFAZOLIN SCH: 330 INJECTION, POWDER, FOR SOLUTION INTRAMUSCULAR; INTRAVENOUS at 14:24

## 2022-07-05 RX ADMIN — ATORVASTATIN CALCIUM SCH MG: 40 TABLET, FILM COATED ORAL at 10:16

## 2022-07-05 RX ADMIN — TAMSULOSIN HYDROCHLORIDE SCH MG: 0.4 CAPSULE ORAL at 10:14

## 2022-07-05 RX ADMIN — IPRATROPIUM BROMIDE AND ALBUTEROL SULFATE SCH ML: .5; 3 SOLUTION RESPIRATORY (INHALATION) at 19:21

## 2022-07-05 RX ADMIN — INSULIN ASPART SCH UNIT: 100 INJECTION, SOLUTION INTRAVENOUS; SUBCUTANEOUS at 21:24

## 2022-07-05 RX ADMIN — IPRATROPIUM BROMIDE AND ALBUTEROL SULFATE SCH ML: .5; 3 SOLUTION RESPIRATORY (INHALATION) at 15:24

## 2022-07-05 RX ADMIN — INSULIN ASPART SCH UNIT: 100 INJECTION, SOLUTION INTRAVENOUS; SUBCUTANEOUS at 07:35

## 2022-07-05 RX ADMIN — INSULIN ASPART SCH UNIT: 100 INJECTION, SOLUTION INTRAVENOUS; SUBCUTANEOUS at 16:49

## 2022-07-05 RX ADMIN — FUROSEMIDE SCH MG: 20 TABLET ORAL at 10:14

## 2022-07-05 RX ADMIN — INSULIN DETEMIR SCH UNIT: 100 INJECTION, SOLUTION SUBCUTANEOUS at 21:22

## 2022-07-05 RX ADMIN — METOPROLOL TARTRATE SCH MG: 25 TABLET, FILM COATED ORAL at 21:24

## 2022-07-05 RX ADMIN — INSULIN ASPART SCH UNIT: 100 INJECTION, SOLUTION INTRAVENOUS; SUBCUTANEOUS at 12:42

## 2022-07-05 RX ADMIN — AMOXICILLIN AND CLAVULANATE POTASSIUM SCH EACH: 875; 125 TABLET, FILM COATED ORAL at 10:15

## 2022-07-05 RX ADMIN — PANTOPRAZOLE SODIUM SCH MG: 40 TABLET, DELAYED RELEASE ORAL at 07:37

## 2022-07-05 RX ADMIN — CLOPIDOGREL BISULFATE SCH MG: 75 TABLET ORAL at 10:16

## 2022-07-05 NOTE — P.PN
Subjective


Progress Note Date: 07/05/22


Patient is a 77-year-old male with diabetes, prior CVA, GERD, hypertension, 

dyslipidemia, and hypothyroidism who was brought to the emergency room by EMS 

after choking while eating.  Patient was hypoxic on paramedic arrival.  The ER 

he underwent an extensive evaluation. He was admitted to the ICU for acute 

hypoxic respiratory failure and aspiration pneumonia.  He was started on Zosyn 

and IV fluids.  He was doing well and was subsequently transitioned to the 

general medical floor.  He then again went into respiratory distress and was 

seen by pulmonary.  He was started on BiPAP and transferred back to the ICU.  He

was given Solu-Medrol and started on antibiotics.  Chest x-ray showed possible 

aspiration pneumonia.  He was noted to have an elevated troponin.  

Echocardiogram was obtained which showed an ejection fraction of 25-30% with an 

akinetic apex.  He was on IV heparin.  He was followed by cardiology and 

initially placed on IV Lasix which was then transferred to oral Lasix..  He was 

seen by speech therapy and was determined to need a nectar thick diet.  He was 

followed by cardiology and pulmonology during his hospital stay.  





Patient seen and examined at bedside.  He is sleeping and does not want to be 

bothered.  He denies any shortness of breath or chest pain.


General: Nontoxic, no distress, appears at stated age


Derm: warm, dry


Head: atraumatic, normocephalic, symmetric


Eyes: EOMI, no lid lag, anicteric sclera


Mouth: no lip lesion, mucus membranes moist


Cardiovascular: S1S2 reg, no murmur, positive posterior tibial pulse bilateral, 


Lungs: Course as bilateral, no rhonchi, no rales , no accessory muscle use


Abdominal: soft,  nontender to palpation, no guarding, no appreciable 

organomegaly


Ext: no gross muscle atrophy, 1+ edema, no contractures


Neuro:  CN II-XI grossly intact, no focal neuro deficits


Psych: Alert, oriented to place, mildly confused but calm and cooperative





Assessment/plan:


Acute aspiration event


Aspiration pneumonia with dysphagia


Acute hypoxic respiratory failure


-Recheck chest x-ray


-Continue with Augmentin completed 5/7 doses


- pulm recs


- modified diet 








Hyponatremia, hypervolemic


-We'll change Lasix to 40 twice daily


-Recheck sodium in a.m.





Leukocytosis, worsening


-Possibly related to pneumonia.  Doubt related to steroids as these have been 

discontinued since 7/1.





Acute non-ST segment elevated myocardial infarction


Acute systolic congestive heart failure with ejection fraction 25-30%


Dyslipidemia


Hypertension


-Continue ASA, Plavix, metoprolol, statin


- lasix, cozaar, aldactone


- strict I and O 








Diabetes mellitus type 2


-Continue with Levemir


-Continue sliding scale insulin


-Follow blood sugars








BPH


- flomax








Initial plan had been to discharge back to rehab today.  However his sodium 

level is down to 129.  We'll monitor for additional 24 hours.











Objective





- Vital Signs


Vital signs: 


                                   Vital Signs











Temp  97.3 F L  07/05/22 08:11


 


Pulse  74   07/05/22 08:36


 


Resp  22   07/05/22 08:11


 


BP  118/69   07/05/22 08:11


 


Pulse Ox  93 L  07/05/22 01:44


 


FiO2  30   07/03/22 07:12








                                 Intake & Output











 07/04/22 07/05/22 07/05/22





 18:59 06:59 18:59


 


Output Total  300 


 


Balance  -300 


 


Weight  99 kg 


 


Output:   


 


  Urine  300 


 


Other:   


 


  Voiding Method External Catheter External Catheter External Catheter


 


  # Voids 1 2 


 


  # Bowel Movements 2  














- Labs


CBC & Chem 7: 


                                 07/05/22 09:50





                                 07/05/22 09:50


Labs: 


                  Abnormal Lab Results - Last 24 Hours (Table)











  07/04/22 07/04/22 07/04/22 Range/Units





  11:29 17:03 20:46 


 


WBC     (3.8-10.6)  k/uL


 


RBC     (4.30-5.90)  m/uL


 


Hgb     (13.0-17.5)  gm/dL


 


Hct     (39.0-53.0)  %


 


Sodium     (137-145)  mmol/L


 


Chloride     ()  mmol/L


 


Glucose     (74-99)  mg/dL


 


POC Glucose (mg/dL)  238 H  333 H  358 H  ()  mg/dL


 


Calcium     (8.4-10.2)  mg/dL














  07/05/22 07/05/22 07/05/22 Range/Units





  07:02 09:50 09:50 


 


WBC   12.4 H   (3.8-10.6)  k/uL


 


RBC   3.65 L   (4.30-5.90)  m/uL


 


Hgb   11.5 L   (13.0-17.5)  gm/dL


 


Hct   33.9 L   (39.0-53.0)  %


 


Sodium    129 L  (137-145)  mmol/L


 


Chloride    96 L  ()  mmol/L


 


Glucose    195 H  (74-99)  mg/dL


 


POC Glucose (mg/dL)  199 H    ()  mg/dL


 


Calcium    8.0 L  (8.4-10.2)  mg/dL

## 2022-07-05 NOTE — XR
EXAMINATION TYPE: XR chest 1V portable

 

DATE OF EXAM: 7/5/2022

 

COMPARISON: Chest x-ray 6/30/2022

 

HISTORY: Pneumonia

 

TECHNIQUE: Single frontal view of the chest is obtained.

 

FINDINGS:  Lung volumes are low. Cardiac mediastinal sweat shows a similar appearance, aorta is dense
. Interstitium is increased, central vascularity mildly prominent. Suspect some improvement in aerati
on. No evident pneumothorax or pleural effusion.

 

IMPRESSION:  There is some improvement in aeration possibly volume status. Cardiomegaly.

## 2022-07-06 LAB
ANION GAP SERPL CALC-SCNC: 1 MMOL/L
ANION GAP SERPL CALC-SCNC: 4 MMOL/L
ANION GAP SERPL CALC-SCNC: 6 MMOL/L
ANION GAP SERPL CALC-SCNC: 7 MMOL/L
BUN SERPL-SCNC: 13 MG/DL (ref 9–20)
CALCIUM SPEC-MCNC: 7.9 MG/DL (ref 8.4–10.2)
CHLORIDE SERPL-SCNC: 94 MMOL/L (ref 98–107)
CHLORIDE SERPL-SCNC: 95 MMOL/L (ref 98–107)
CHLORIDE SERPL-SCNC: 95 MMOL/L (ref 98–107)
CHLORIDE SERPL-SCNC: 96 MMOL/L (ref 98–107)
CO2 SERPL-SCNC: 22 MMOL/L (ref 22–30)
CO2 SERPL-SCNC: 25 MMOL/L (ref 22–30)
CO2 SERPL-SCNC: 27 MMOL/L (ref 22–30)
CO2 SERPL-SCNC: 31 MMOL/L (ref 22–30)
ERYTHROCYTE [DISTWIDTH] IN BLOOD BY AUTOMATED COUNT: 3.5 M/UL (ref 4.3–5.9)
ERYTHROCYTE [DISTWIDTH] IN BLOOD: 13.8 % (ref 11.5–15.5)
GLUCOSE BLD-MCNC: 151 MG/DL (ref 70–110)
GLUCOSE BLD-MCNC: 320 MG/DL (ref 70–110)
GLUCOSE BLD-MCNC: 385 MG/DL (ref 70–110)
GLUCOSE BLD-MCNC: 396 MG/DL (ref 70–110)
GLUCOSE SERPL-MCNC: 143 MG/DL (ref 74–99)
HCT VFR BLD AUTO: 32.5 % (ref 39–53)
HGB BLD-MCNC: 11.1 GM/DL (ref 13–17.5)
MAGNESIUM SPEC-SCNC: 1.8 MG/DL (ref 1.6–2.3)
MCH RBC QN AUTO: 31.7 PG (ref 25–35)
MCHC RBC AUTO-ENTMCNC: 34.2 G/DL (ref 31–37)
MCV RBC AUTO: 92.9 FL (ref 80–100)
PLATELET # BLD AUTO: 241 K/UL (ref 150–450)
POTASSIUM SERPL-SCNC: 4.4 MMOL/L (ref 3.5–5.1)
POTASSIUM SERPL-SCNC: 4.5 MMOL/L (ref 3.5–5.1)
POTASSIUM SERPL-SCNC: 4.8 MMOL/L (ref 3.5–5.1)
POTASSIUM SERPL-SCNC: 4.9 MMOL/L (ref 3.5–5.1)
SODIUM SERPL-SCNC: 125 MMOL/L (ref 137–145)
SODIUM SERPL-SCNC: 125 MMOL/L (ref 137–145)
SODIUM SERPL-SCNC: 126 MMOL/L (ref 137–145)
SODIUM SERPL-SCNC: 127 MMOL/L (ref 137–145)
WBC # BLD AUTO: 11.9 K/UL (ref 3.8–10.6)

## 2022-07-06 RX ADMIN — HEPARIN SODIUM SCH UNIT: 5000 INJECTION INTRAVENOUS; SUBCUTANEOUS at 20:06

## 2022-07-06 RX ADMIN — IPRATROPIUM BROMIDE AND ALBUTEROL SULFATE SCH ML: .5; 3 SOLUTION RESPIRATORY (INHALATION) at 20:11

## 2022-07-06 RX ADMIN — INSULIN ASPART SCH UNIT: 100 INJECTION, SOLUTION INTRAVENOUS; SUBCUTANEOUS at 21:19

## 2022-07-06 RX ADMIN — LOSARTAN POTASSIUM SCH MG: 25 TABLET, FILM COATED ORAL at 20:06

## 2022-07-06 RX ADMIN — PANTOPRAZOLE SODIUM SCH MG: 40 TABLET, DELAYED RELEASE ORAL at 10:55

## 2022-07-06 RX ADMIN — THYROID, PORCINE SCH MG: 30 TABLET ORAL at 10:54

## 2022-07-06 RX ADMIN — IPRATROPIUM BROMIDE AND ALBUTEROL SULFATE SCH ML: .5; 3 SOLUTION RESPIRATORY (INHALATION) at 11:45

## 2022-07-06 RX ADMIN — CEFAZOLIN SCH: 330 INJECTION, POWDER, FOR SOLUTION INTRAMUSCULAR; INTRAVENOUS at 18:01

## 2022-07-06 RX ADMIN — AMOXICILLIN AND CLAVULANATE POTASSIUM SCH EACH: 875; 125 TABLET, FILM COATED ORAL at 10:54

## 2022-07-06 RX ADMIN — ATORVASTATIN CALCIUM SCH MG: 40 TABLET, FILM COATED ORAL at 10:55

## 2022-07-06 RX ADMIN — METOPROLOL TARTRATE SCH MG: 25 TABLET, FILM COATED ORAL at 20:06

## 2022-07-06 RX ADMIN — IPRATROPIUM BROMIDE AND ALBUTEROL SULFATE SCH ML: .5; 3 SOLUTION RESPIRATORY (INHALATION) at 15:09

## 2022-07-06 RX ADMIN — INSULIN ASPART SCH UNIT: 100 INJECTION, SOLUTION INTRAVENOUS; SUBCUTANEOUS at 17:50

## 2022-07-06 RX ADMIN — TAMSULOSIN HYDROCHLORIDE SCH MG: 0.4 CAPSULE ORAL at 10:54

## 2022-07-06 RX ADMIN — ISOSORBIDE MONONITRATE SCH MG: 30 TABLET, EXTENDED RELEASE ORAL at 10:54

## 2022-07-06 RX ADMIN — INSULIN ASPART SCH: 100 INJECTION, SOLUTION INTRAVENOUS; SUBCUTANEOUS at 10:55

## 2022-07-06 RX ADMIN — METOPROLOL TARTRATE SCH MG: 25 TABLET, FILM COATED ORAL at 10:54

## 2022-07-06 RX ADMIN — HEPARIN SODIUM SCH UNIT: 5000 INJECTION INTRAVENOUS; SUBCUTANEOUS at 10:53

## 2022-07-06 RX ADMIN — CLOPIDOGREL BISULFATE SCH MG: 75 TABLET ORAL at 10:55

## 2022-07-06 RX ADMIN — INSULIN DETEMIR SCH UNIT: 100 INJECTION, SOLUTION SUBCUTANEOUS at 21:19

## 2022-07-06 RX ADMIN — IPRATROPIUM BROMIDE AND ALBUTEROL SULFATE SCH ML: .5; 3 SOLUTION RESPIRATORY (INHALATION) at 08:02

## 2022-07-06 RX ADMIN — INSULIN ASPART SCH UNIT: 100 INJECTION, SOLUTION INTRAVENOUS; SUBCUTANEOUS at 12:27

## 2022-07-06 RX ADMIN — ASPIRIN 81 MG CHEWABLE TABLET SCH MG: 81 TABLET CHEWABLE at 10:54

## 2022-07-06 NOTE — P.NPCON
History of Present Illness





- Reason for Consult


hyponatremia





- History of Present Illness





Reason for consultation: Hyponatremia





History of present illness:


Patient is a 77-year-old male seen in renal consultation for hyponatremia.  

Patient presented to the hospital on 06/25/2022 after he was found to be a

spirating.  Patient's sodium on admission was 123 and initially received IV 

fluids.  Subsequently he received IV Lasix and then oral Lasix.  Due to sodium 

level dropping again diuretics were stopped yesterday.  GFR is at baseline.  

Blood pressure stable.  Patient is nonoliguric.  Patient was on IV antibiotics 

and is now on oral Augmentin.  Echocardiogram done this admission showed 

ejection fraction of 25-30%.  Patient does have history of diabetes.  Blood 

sugars are fairly well controlled.  Denies vomiting or diarrhea.  States he has 

been eating.  Currently on dysphagia 3 chopped diet.





Vital signs are stable.


General: Awake.  No acute distress.


HEENT: Head exam is unremarkable.  On nasal cannula.


LUNGS: Breath sounds decreased.


HEART: Rate and Rhythm are regular.


ABDOMEN: Soft, no distention.


EXTREMITITES: No edema.





Past Medical History


Past Medical History: CVA/TIA, Diabetes Mellitus, GERD/Reflux, Hyperlipidemia, 

Hypertension, Thyroid Disorder


Additional Past Medical History / Comment(s): States had a stroke left arm and 

leg numbness


History of Any Multi-Drug Resistant Organisms: None Reported


Additional Past Surgical History / Comment(s): cataract surgery


Past Anesthesia/Blood Transfusion Reactions: No Reported Reaction


Smoking Status: Never smoker





- Past Family History


  ** Mother


Family Medical History: Diabetes Mellitus





Medications and Allergies


                                Home Medications











 Medication  Instructions  Recorded  Confirmed  Type


 


Thyroid,Pork [[place] Thyroid] 90 mg PO DAILY 06/27/17 06/25/22 History


 


Aspirin EC [Ecotrin Low Dose] 81 mg PO DAILY 06/04/21 06/25/22 History


 


Finasteride [Proscar] 5 mg PO DAILY 06/04/21 06/25/22 History


 


Tamsulosin HCl [Flomax] 0.4 mg PO DAILY 06/04/21 06/25/22 History


 


Atorvastatin [Lipitor] 40 mg PO HS@2000 01/18/22 06/25/22 History


 


sitaGLIPtin [Januvia] 100 mg PO DAILY 01/18/22 06/25/22 History


 


DULoxetine HCL 40 mg PO DAILY 06/25/22 06/25/22 History


 


Docusate [Colace] 100 mg PO  06/25/22 06/25/22 History


 


Multivitamins, Thera [Multivitamin 1 tab PO DAILY 06/25/22 06/25/22 History





(formulary)]    


 


Sennosides [Senokot] 8.6 mg PO DAILY 06/25/22 06/25/22 History


 


glipiZIDE [Glucotrol] 5 mg PO DAILY 06/25/22 06/25/22 History


 


metFORMIN  mg PO BID 06/25/22 06/25/22 History


 


traMADol HCL 50 mg PO Q6H PRN 06/25/22 06/25/22 History


 


Amoxic-Pot Clav 875-125Mg 1 each PO BID 5 Days #10 tab 07/01/22  Rx





[Augmentin 875-125]    


 


Clopidogrel [Plavix] 75 mg PO DAILY  tab 07/01/22  Rx


 


Furosemide [Lasix] 20 mg PO BID  tab 07/01/22  Rx


 


Insulin Glargine [Lantus Vial] 12 unit SQ HS #0 07/01/22 06/25/22 Rx


 


Isosorbide Mononitrate ER [Imdur] 30 mg PO DAILY  tab 07/01/22  Rx


 


Losartan [Cozaar] 25 mg PO HS  tab 07/01/22  Rx


 


Metoprolol Tartrate [Lopressor] 25 mg PO BID  tab 07/01/22  Rx


 


Spironolactone [Aldactone] 25 mg PO DAILY  tab 07/01/22  Rx








                                    Allergies











Allergy/AdvReac Type Severity Reaction Status Date / Time


 


No Known Allergies Allergy   Verified 06/25/22 17:12














Physical Exam


Vitals: 


                                   Vital Signs











  Temp Pulse Pulse Resp BP Pulse Ox


 


 07/06/22 08:17   72    


 


 07/06/22 08:02   72    


 


 07/06/22 07:33  98.2 F   68  15  106/64  97


 


 07/06/22 01:16  98.5 F   63  20  111/64  98


 


 07/05/22 19:59  98.3 F   76  20  137/71  97


 


 07/05/22 19:28   78    


 


 07/05/22 19:21   76    


 


 07/05/22 15:37   76    


 


 07/05/22 15:24   72    


 


 07/05/22 13:57    79  24  


 


 07/05/22 13:54  97.8 F   79  24  103/68  96


 


 07/05/22 12:31   80    


 


 07/05/22 12:21   80    


 


 07/05/22 11:39    73  22  








                                Intake and Output











 07/05/22 07/06/22 07/06/22





 22:59 06:59 14:59


 


Intake Total 500  720


 


Output Total 950 150 


 


Balance -450 -150 720


 


Intake:   


 


  Oral 500  720


 


Output:   


 


  Urine 950 150 


 


    Straight 625  


 


Other:   


 


  Voiding Method External Catheter  


 


  # Voids 3  


 


  # Bowel Movements 1  


 


  Weight  98 kg 














Results





- Lab Results


                             Most recent lab results











Calcium  7.9 mg/dL (8.4-10.2)  L  07/06/22  06:43    


 


Phosphorus  3.1 mg/dL (2.5-4.5)   06/26/22  02:58    


 


Magnesium  1.8 mg/dL (1.6-2.3)   07/06/22  06:43    














                                 07/06/22 06:43





                                 07/06/22 06:43





Assessment and Plan


Plan: 





Assessment:


1.  Hyponatremia.  Currently appears euvolemic.  Urine sodium 43 and urine 

osmolality 6:15 date 06/25/2022.  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


2.  Aspiration pneumonia on antibiotics.


3.  Diabetes mellitus.


4.  Systolic CHF with ejection fraction of 20-25%.





Plan:


Continue to hold off on fluids and diuretics.


Add 1200 mL fluid restriction.


Samsca 15 mg once today.


Repeat labs in the morning.


Repeat urine studies.


Check TSH as well.


Blood sugar control.





Thank you for the consultation.  I will continue to follow the patient with you 

during his hospital stay.

## 2022-07-06 NOTE — P.PN
Subjective


Progress Note Date: 07/06/22 (delayed charting seen at 0945)


Patient is a 77-year-old male with diabetes, prior CVA, GERD, hypertension, 

dyslipidemia, and hypothyroidism who was brought to the emergency room by EMS 

after choking while eating.  Patient was hypoxic on paramedic arrival.  The ER 

he underwent an extensive evaluation. He was admitted to the ICU for acute 

hypoxic respiratory failure and aspiration pneumonia.  He was started on Zosyn 

and IV fluids.  He was doing well and was subsequently transitioned to the [facility] floor.  He then again went into respiratory distress and was seen 

by pulmonary.  He was started on BiPAP and transferred back to the ICU.  He was 

given Solu-Medrol and started on antibiotics.  Chest x-ray showed possible 

aspiration pneumonia.  He was noted to have an elevated troponin.  

Echocardiogram was obtained which showed an ejection fraction of 25-30% with an 

akinetic apex.  He was on IV heparin.  He was followed by cardiology and 

initially placed on IV Lasix which was then transferred to oral Lasix..  He was 

seen by speech therapy and was determined to need a nectar thick diet.  He was 

followed by cardiology and pulmonology during his hospital stay.  





Patient seen and examined at bedside.  More awake today, complains of being very

dry. We discussed that he needs to be on thickened liquid due aspiration, he is 

aware. He denies chest pain or shortness of breath. 


General: Nontoxic, no distress, appears at stated age


Derm: warm, dry


Head: atraumatic, normocephalic, symmetric


Eyes: EOMI, no lid lag, anicteric sclera


Mouth: no lip lesion, mucus membranes dry


Cardiovascular: S1S2 reg, no murmur, positive posterior tibial pulse bilateral, 


Lungs: Course as bilateral, no rhonchi, no rales , no accessory muscle use


Abdominal: soft,  nontender to palpation, no guarding, no appreciable 

organomegaly


Ext: no gross muscle atrophy, no edema, no contractures


Neuro:  CN II-XI grossly intact, no focal neuro deficits


Psych: Alert, oriented to place, mildly confused but calm and cooperative





Assessment/plan:


Acute aspiration event


Aspiration pneumonia with dysphagia


Acute hypoxic respiratory failure


-chest x-ray improved


-Continue with Augmentin completed 6/7 doses


- pulm recs


- modified diet 








Hyponatremia, hypervolemic


-stop lasix and aldactone


- check TSH


- Consult nephrology 


- check urine and serum osmol, urine Na likely false due to diuretics 


-serial lytes 





Acute non-ST segment elevated myocardial infarction


Acute systolic congestive heart failure with ejection fraction 25-30%


Dyslipidemia


Hypertension


-Continue ASA, Plavix, metoprolol, statin,  cozaar,


- lasix, aldactone on hold due to low sodium


- strict I and O 








Diabetes mellitus type 2


-Continue with Levemir


-Continue sliding scale insulin


-Follow blood sugars








BPH


- flomax





Leukocytosis, improving

















Objective





- Vital Signs


Vital signs: 


                                   Vital Signs











Temp  98.2 F   07/06/22 07:33


 


Pulse  68   07/06/22 15:09


 


Resp  18   07/06/22 15:09


 


BP  106/64   07/06/22 07:33


 


Pulse Ox  97   07/06/22 07:33


 


FiO2  30   07/03/22 07:12








                                 Intake & Output











 07/05/22 07/06/22 07/06/22





 18:59 06:59 18:59


 


Intake Total 500  720


 


Output Total 650 450 


 


Balance -150 -450 720


 


Weight 99 kg 98 kg 


 


Intake:   


 


  Oral 500  720


 


Output:   


 


  Urine 650 450 


 


    Straight 325 300 


 


Other:   


 


  Voiding Method Incontinent External Catheter Indwelling Catheter


 


  # Voids 3  


 


  # Bowel Movements 1  














- Labs


CBC & Chem 7: 


                                 07/06/22 06:43





                                 07/06/22 10:49


Labs: 


                  Abnormal Lab Results - Last 24 Hours (Table)











  07/05/22 07/05/22 07/06/22 Range/Units





  16:29 21:09 06:43 


 


WBC    11.9 H  (3.8-10.6)  k/uL


 


RBC    3.50 L  (4.30-5.90)  m/uL


 


Hgb    11.1 L  (13.0-17.5)  gm/dL


 


Hct    32.5 L  (39.0-53.0)  %


 


Sodium     (137-145)  mmol/L


 


Chloride     ()  mmol/L


 


Glucose     (74-99)  mg/dL


 


POC Glucose (mg/dL)  301 H  267 H   ()  mg/dL


 


Osmolality     (280-301)  mosm/kg


 


Calcium     (8.4-10.2)  mg/dL














  07/06/22 07/06/22 07/06/22 Range/Units





  06:43 06:58 10:38 


 


WBC     (3.8-10.6)  k/uL


 


RBC     (4.30-5.90)  m/uL


 


Hgb     (13.0-17.5)  gm/dL


 


Hct     (39.0-53.0)  %


 


Sodium  125 L    (137-145)  mmol/L


 


Chloride  96 L    ()  mmol/L


 


Glucose  143 H    (74-99)  mg/dL


 


POC Glucose (mg/dL)   151 H   ()  mg/dL


 


Osmolality    275 L  (280-301)  mosm/kg


 


Calcium  7.9 L    (8.4-10.2)  mg/dL














  07/06/22 07/06/22 Range/Units





  10:49 11:44 


 


WBC    (3.8-10.6)  k/uL


 


RBC    (4.30-5.90)  m/uL


 


Hgb    (13.0-17.5)  gm/dL


 


Hct    (39.0-53.0)  %


 


Sodium  125 L   (137-145)  mmol/L


 


Chloride  94 L   ()  mmol/L


 


Glucose    (74-99)  mg/dL


 


POC Glucose (mg/dL)   320 H  ()  mg/dL


 


Osmolality  275 L   (280-301)  mosm/kg


 


Calcium    (8.4-10.2)  mg/dL

## 2022-07-07 VITALS
HEART RATE: 73 BPM | SYSTOLIC BLOOD PRESSURE: 136 MMHG | RESPIRATION RATE: 14 BRPM | DIASTOLIC BLOOD PRESSURE: 76 MMHG | TEMPERATURE: 97.9 F

## 2022-07-07 LAB
ANION GAP SERPL CALC-SCNC: 4 MMOL/L
BUN SERPL-SCNC: 14 MG/DL (ref 9–20)
CALCIUM SPEC-MCNC: 8.7 MG/DL (ref 8.4–10.2)
CHLORIDE SERPL-SCNC: 97 MMOL/L (ref 98–107)
CO2 SERPL-SCNC: 31 MMOL/L (ref 22–30)
GLUCOSE BLD-MCNC: 199 MG/DL (ref 70–110)
GLUCOSE BLD-MCNC: 254 MG/DL (ref 70–110)
GLUCOSE SERPL-MCNC: 226 MG/DL (ref 74–99)
MAGNESIUM SPEC-SCNC: 2.1 MG/DL (ref 1.5–2.4)
POTASSIUM SERPL-SCNC: 4.5 MMOL/L (ref 3.5–5.1)
SODIUM SERPL-SCNC: 132 MMOL/L (ref 137–145)

## 2022-07-07 RX ADMIN — PANTOPRAZOLE SODIUM SCH MG: 40 TABLET, DELAYED RELEASE ORAL at 08:22

## 2022-07-07 RX ADMIN — AMOXICILLIN AND CLAVULANATE POTASSIUM SCH EACH: 875; 125 TABLET, FILM COATED ORAL at 11:29

## 2022-07-07 RX ADMIN — HEPARIN SODIUM SCH UNIT: 5000 INJECTION INTRAVENOUS; SUBCUTANEOUS at 11:27

## 2022-07-07 RX ADMIN — THYROID, PORCINE SCH MG: 30 TABLET ORAL at 11:29

## 2022-07-07 RX ADMIN — INSULIN ASPART SCH UNIT: 100 INJECTION, SOLUTION INTRAVENOUS; SUBCUTANEOUS at 08:19

## 2022-07-07 RX ADMIN — TAMSULOSIN HYDROCHLORIDE SCH MG: 0.4 CAPSULE ORAL at 11:28

## 2022-07-07 RX ADMIN — IPRATROPIUM BROMIDE AND ALBUTEROL SULFATE SCH: .5; 3 SOLUTION RESPIRATORY (INHALATION) at 11:42

## 2022-07-07 RX ADMIN — IPRATROPIUM BROMIDE AND ALBUTEROL SULFATE SCH ML: .5; 3 SOLUTION RESPIRATORY (INHALATION) at 08:02

## 2022-07-07 RX ADMIN — METOPROLOL TARTRATE SCH MG: 25 TABLET, FILM COATED ORAL at 11:28

## 2022-07-07 RX ADMIN — ASPIRIN 81 MG CHEWABLE TABLET SCH MG: 81 TABLET CHEWABLE at 11:31

## 2022-07-07 RX ADMIN — ISOSORBIDE MONONITRATE SCH MG: 30 TABLET, EXTENDED RELEASE ORAL at 11:31

## 2022-07-07 RX ADMIN — INSULIN ASPART SCH UNIT: 100 INJECTION, SOLUTION INTRAVENOUS; SUBCUTANEOUS at 13:04

## 2022-07-07 RX ADMIN — CLOPIDOGREL BISULFATE SCH MG: 75 TABLET ORAL at 11:27

## 2022-07-07 RX ADMIN — ATORVASTATIN CALCIUM SCH MG: 40 TABLET, FILM COATED ORAL at 11:28

## 2022-07-07 NOTE — P.PN
Subjective





Patient seen in follow-up for hyponatremia.  Sodium level 132 today.  Oral 

intake is just fair.  Patient is quite lethargic.  Not answering questions at 

this time.





Vital signs are stable.


General: Resting in bed.


HEENT: Head exam is unremarkable. 


LUNGS: Breath sounds decreased.


HEART: Rate and Rhythm are regular. 


ABDOMEN: Soft, no distention.


EXTREMITITES: Trace edema.





Objective





- Vital Signs


Vital signs: 


                                   Vital Signs











Temp  97.9 F   07/07/22 08:43


 


Pulse  73   07/07/22 08:43


 


Resp  14   07/07/22 08:43


 


BP  136/76   07/07/22 08:43


 


Pulse Ox  94 L  07/07/22 08:43


 


FiO2  30   07/03/22 07:12








                                 Intake & Output











 07/06/22 07/07/22 07/07/22





 18:59 06:59 18:59


 


Intake Total 720  


 


Output Total 2925 2600 


 


Balance -2205 -2600 


 


Weight  97 kg 


 


Intake:   


 


  Oral 720  


 


Output:   


 


  Urine 2925 2600 


 


    Coude  2000 


 


Other:   


 


  Voiding Method Indwelling Catheter Indwelling Catheter Indwelling Catheter














- Labs


CBC & Chem 7: 


                                 07/06/22 06:43





                                 07/07/22 05:36


Labs: 


                  Abnormal Lab Results - Last 24 Hours (Table)











  07/06/22 07/06/22 07/06/22 Range/Units





  10:38 10:49 11:44 


 


Sodium   125 L   (137-145)  mmol/L


 


Chloride   94 L   ()  mmol/L


 


Carbon Dioxide     (22-30)  mmol/L


 


Glucose     (74-99)  mg/dL


 


POC Glucose (mg/dL)    320 H  ()  mg/dL


 


Osmolality  275 L  275 L   (280-301)  mosm/kg














  07/06/22 07/06/22 07/06/22 Range/Units





  16:49 16:57 20:59 


 


Sodium   126 L   (137-145)  mmol/L


 


Chloride   95 L   ()  mmol/L


 


Carbon Dioxide     (22-30)  mmol/L


 


Glucose     (74-99)  mg/dL


 


POC Glucose (mg/dL)  396 H   385 H  ()  mg/dL


 


Osmolality     (280-301)  mosm/kg














  07/06/22 07/07/22 07/07/22 Range/Units





  22:37 05:36 06:58 


 


Sodium  127 L  132 L   (137-145)  mmol/L


 


Chloride  95 L  97 L   ()  mmol/L


 


Carbon Dioxide  31 H  31 H   (22-30)  mmol/L


 


Glucose   226 H   (74-99)  mg/dL


 


POC Glucose (mg/dL)    254 H  ()  mg/dL


 


Osmolality     (280-301)  mosm/kg














Assessment and Plan


Plan: 





Assessment:


1.  Hyponatremia.  Currently appears euvolemic.  Urine sodium 43 and urine 

osmolality 615 date 06/25/2022.  Urine sodium 98 and urine osmolality 577 dated 

07/06/2022.  TSH normal.  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  Status

post Samsca 07/06/2022.  Sodium level 132 today.


2.  Aspiration pneumonia on antibiotics.


3.  Diabetes mellitus.


4.  Systolic CHF with ejection fraction of 20-25%.





Plan:


Maintain fluid restriction.


Okay to start low-dose Lasix and spironolactone.  Discussed with primary 

attending.


Blood sugar control.


Repeat BMP and magnesium level 2-3 days postdischarge.  Follow up outpatient in 

1 week.

## 2022-07-07 NOTE — P.DS
Providers


Date of admission: 


06/25/22 15:36





Expected date of discharge: 07/07/22


Attending physician: 


Georges Perez MD





Consults: 





                                        





06/25/22 15:36


Consult Physician Stat 


   Consulting Provider: Foreign Corado


   Consult Reason/Comments: Aspiration, hypoxia


   Do you want consulting provider notified?: Already Contacted





07/06/22 08:06


Consult Physician Routine 


   Consulting Provider: Drew Taylor


   Consult Reason/Comments: hyponatremia


   Do you want consulting provider notified?: Yes











Primary care physician: 


Stated None





Hospital Course: 


Discharge Diagnosis:


Acute aspiration event


Aspiration pneumonia with dysphagia


Acute hypoxic respiratory failure


Acute metabolic encepahalopathy 


Urinary retention


Hyponatremia wtih component of SIADH


Acute non-ST segment elevated myocardial infarction


Acute systolic congestive heart failure with ejection fraction 25-30%


Dyslipidemia


Hypertension


Diabetes mellitus type 2


BPH


Leukocytosis, improving





Hospital Course: 


Patient is a 77-year-old male with diabetes, prior CVA, GERD, hypertension, 

dyslipidemia, and hypothyroidism who was brought to the emergency room by EMS 

after choking while eating.  Patient was hypoxic on paramedic arrival.  The ER 

he underwent an extensive evaluation. He was admitted to the ICU for acute 

hypoxic respiratory failure and aspiration pneumonia.  He was started on Zosyn 

and IV fluids.  He was doing well and was subsequently transitioned to the 

general medical floor.  He then again went into respiratory distress and was 

seen by pulmonary.  He was started on BiPAP and transferred back to the ICU.  He

was given Solu-Medrol and started on antibiotics.  Chest x-ray showed possible 

aspiration pneumonia.  He was noted to have an elevated troponin.  

Echocardiogram was obtained which showed an ejection fraction of 25-30% with an 

akinetic apex.  He was on IV heparin.  He was followed by cardiology and 

initially placed on IV Lasix which was then transferred to oral Lasix..  He was 

seen by speech therapy and was determined to need a nectar thick diet.  He was 

followed by cardiology and pulmonology during his hospital stay. He failed a 

voiding trail and ramos was reinserted. He continued to improved. He did have 

recurrent hypoantremia and was seen by nephrology. 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 

suyspect due to his PNA. He was given 1 dose of Samsca . He improved and was 

determined stable for discharge. 





Follow-up: BMP in 3-5 days DX: Hyponatremia, Fluid restriction 1200 mL, 

Aldactone 12.5 mg and Lasix 20 mg daily, follow with cardio, nephrology, 

maintain Ramos catheter, voiding trial in 1 week








Patient seen and examined at bedside. He is sleeping but asks to be left alone. 







Vital signs reviewed and stable. 


General: nontoxic, no distress, appears at stated age


Derm: warm, dry


Head: atraumatic, normocephalic, symmetric


Eyes: EOMI, no lid lag, anicteric sclera


Mouth: no lip lesion, mucus membranes moist


Cardiovascular: S1S2 reg, no murmur, positive posterior tibial pulse bilateral, 


Lungs: Course bs  bilateral, no rhonchi, no rales , no accessory muscle use


Abdominal: soft,  nontender to palpation, no guarding, no appreciable o

rganomegaly


Ext: no gross muscle atrophy, no edema, no contractures


Neuro:  CN II-XI grossly intact, no focal neuro deficits


Psych: Alert, oriented, appropriate affect 








A total of 40 minutes of time were spent preparing this complex discharge 

summary.


Patient was discharged on 7/7/22. 





Patient Condition at Discharge: Stable





Plan - Discharge Summary


Discharge Rx Participant: No


New Discharge Prescriptions: 


New


   Metoprolol Tartrate [Lopressor] 25 mg PO BID  tab


   Clopidogrel [Plavix] 75 mg PO DAILY  tab


   Losartan [Cozaar] 25 mg PO HS  tab


   Isosorbide Mononitrate ER [Imdur] 30 mg PO DAILY  tab


   Spironolactone [Aldactone] 12.5 mg PO DAILY #30 tablet


   Ipratropium-Albuterol Nebulize [Duoneb 0.5 mg-3 mg/3 ml Soln] 3 ml INHALATION

RT-Q4H PRN  each


     PRN Reason: Shortness Of Breath Or Wheezing


   Furosemide [Lasix] 20 mg PO DAILY #30 tab





Continue


   Thyroid,Pork [Myra Thyroid] 90 mg PO DAILY


   Finasteride [Proscar] 5 mg PO DAILY


   Aspirin EC [Ecotrin Low Dose] 81 mg PO DAILY


   Atorvastatin [Lipitor] 40 mg PO HS@2000


   Docusate [Colace] 100 mg PO HS


   Multivitamins, Thera [Multivitamin (formulary)] 1 tab PO DAILY


   traMADol HCL 50 mg PO Q6H PRN #30 tab


     PRN Reason: Pain


   Tamsulosin HCl [Flomax] 0.4 mg PO DAILY


   sitaGLIPtin [Januvia] 100 mg PO DAILY


   Sennosides [Senokot] 8.6 mg PO DAILY





Discontinued


   Insulin Glargine [Lantus Vial] 17 unit SQ HS


   Losartan Potassium [Cozaar] 25 mg PO HS


   metFORMIN  mg PO BID


   Trihexyphenidyl HCl 1 mg PO TID


   Nitroglycerin Sl Tabs [Nitrostat] 0.4 mg SL Q5M PRN


     PRN Reason: Chest Pain


   Acetaminophen Tab [Tylenol] 325 mg PO Q6HR PRN  tab


     PRN Reason: Fever And/ Or Pain


   Sodium Chloride Tab 1 gm PO HS


   DULoxetine HCL 40 mg PO DAILY


   glipiZIDE [Glucotrol] 5 mg PO DAILY


   Metoprolol Succinate [Toprol XL] 50 mg PO DAILY


Discharge Medication List





Thyroid,Pork [Myra Thyroid] 90 mg PO DAILY 06/27/17 [History]


Aspirin EC [Ecotrin Low Dose] 81 mg PO DAILY 06/04/21 [History]


Finasteride [Proscar] 5 mg PO DAILY 06/04/21 [History]


Tamsulosin HCl [Flomax] 0.4 mg PO DAILY 06/04/21 [History]


Atorvastatin [Lipitor] 40 mg PO HS@2000 01/18/22 [History]


sitaGLIPtin [Januvia] 100 mg PO DAILY 01/18/22 [History]


Docusate [Colace] 100 mg PO HS 06/25/22 [History]


Multivitamins, Thera [Multivitamin (formulary)] 1 tab PO DAILY 06/25/22 

[History]


Sennosides [Senokot] 8.6 mg PO DAILY 06/25/22 [History]


Clopidogrel [Plavix] 75 mg PO DAILY  tab 07/01/22 [Rx]


Isosorbide Mononitrate ER [Imdur] 30 mg PO DAILY  tab 07/01/22 [Rx]


Losartan [Cozaar] 25 mg PO HS  tab 07/01/22 [Rx]


Metoprolol Tartrate [Lopressor] 25 mg PO BID  tab 07/01/22 [Rx]


Furosemide [Lasix] 20 mg PO DAILY #30 tab 07/07/22 [Rx]


Ipratropium-Albuterol Nebulize [Duoneb 0.5 mg-3 mg/3 ml Soln] 3 ml INHALATION 

RT-Q4H PRN  each 07/07/22 [Rx]


Spironolactone [Aldactone] 12.5 mg PO DAILY #30 tablet 07/07/22 [Rx]


traMADol HCL 50 mg PO Q6H PRN #30 tab 07/07/22 [Rx]








Follow up Appointment(s)/Referral(s): 


Jaime Ray MD [STAFF PHYSICIAN] - 1 Week


None,Stated [Primary Care Provider] - 1-2 days


Drew Taylor DO [STAFF PHYSICIAN] - 2 Weeks


Activity/Diet/Wound Care/Special Instructions: 


Activity:


as tolerated





Diet: 


Dysphasia level 3: chopped diet, Aspiration precautions, 1:1 feed, 1200 mL fluid

restriction


carb consistent





Special Instructions: 


BMP in 23- days DX: SIADH


Accucheck every AM 


Maintain ramos cath due to urinary retention, voiding trial in 1 week. 


 


Discharge Disposition: TRANSFER TO SNF/ECF

## 2022-07-08 ENCOUNTER — HOSPITAL ENCOUNTER (INPATIENT)
Dept: HOSPITAL 47 - EC | Age: 77
LOS: 7 days | Discharge: STILL ADMITTED OR ADMINISTRATIVE | DRG: 64 | End: 2022-07-15
Attending: INTERNAL MEDICINE | Admitting: INTERNAL MEDICINE
Payer: MEDICARE

## 2022-07-08 VITALS — BODY MASS INDEX: 28.6 KG/M2

## 2022-07-08 DIAGNOSIS — Z20.822: ICD-10-CM

## 2022-07-08 DIAGNOSIS — I69.398: ICD-10-CM

## 2022-07-08 DIAGNOSIS — Z79.82: ICD-10-CM

## 2022-07-08 DIAGNOSIS — E03.9: ICD-10-CM

## 2022-07-08 DIAGNOSIS — I69.351: ICD-10-CM

## 2022-07-08 DIAGNOSIS — E87.2: ICD-10-CM

## 2022-07-08 DIAGNOSIS — G93.6: ICD-10-CM

## 2022-07-08 DIAGNOSIS — Z79.84: ICD-10-CM

## 2022-07-08 DIAGNOSIS — I21.4: ICD-10-CM

## 2022-07-08 DIAGNOSIS — I50.23: ICD-10-CM

## 2022-07-08 DIAGNOSIS — J69.0: ICD-10-CM

## 2022-07-08 DIAGNOSIS — Z79.890: ICD-10-CM

## 2022-07-08 DIAGNOSIS — R09.02: ICD-10-CM

## 2022-07-08 DIAGNOSIS — R33.8: ICD-10-CM

## 2022-07-08 DIAGNOSIS — Z87.01: ICD-10-CM

## 2022-07-08 DIAGNOSIS — G04.90: ICD-10-CM

## 2022-07-08 DIAGNOSIS — G93.89: ICD-10-CM

## 2022-07-08 DIAGNOSIS — Z66: ICD-10-CM

## 2022-07-08 DIAGNOSIS — I42.8: ICD-10-CM

## 2022-07-08 DIAGNOSIS — R20.0: ICD-10-CM

## 2022-07-08 DIAGNOSIS — E78.5: ICD-10-CM

## 2022-07-08 DIAGNOSIS — I11.0: ICD-10-CM

## 2022-07-08 DIAGNOSIS — I25.10: ICD-10-CM

## 2022-07-08 DIAGNOSIS — Z83.3: ICD-10-CM

## 2022-07-08 DIAGNOSIS — G93.41: ICD-10-CM

## 2022-07-08 DIAGNOSIS — R13.19: ICD-10-CM

## 2022-07-08 DIAGNOSIS — N40.1: ICD-10-CM

## 2022-07-08 DIAGNOSIS — M62.50: ICD-10-CM

## 2022-07-08 DIAGNOSIS — Z79.899: ICD-10-CM

## 2022-07-08 DIAGNOSIS — D64.89: ICD-10-CM

## 2022-07-08 DIAGNOSIS — R29.810: ICD-10-CM

## 2022-07-08 DIAGNOSIS — G91.4: ICD-10-CM

## 2022-07-08 DIAGNOSIS — Z79.02: ICD-10-CM

## 2022-07-08 DIAGNOSIS — E11.65: ICD-10-CM

## 2022-07-08 DIAGNOSIS — N17.9: ICD-10-CM

## 2022-07-08 DIAGNOSIS — Z51.5: ICD-10-CM

## 2022-07-08 DIAGNOSIS — I60.12: Primary | ICD-10-CM

## 2022-07-08 DIAGNOSIS — I49.1: ICD-10-CM

## 2022-07-08 LAB
ALBUMIN SERPL-MCNC: 3.4 G/DL (ref 3.5–5)
ALP SERPL-CCNC: 97 U/L (ref 38–126)
ALT SERPL-CCNC: 27 U/L (ref 4–49)
ANION GAP SERPL CALC-SCNC: 6 MMOL/L
APTT BLD: 22.6 SEC (ref 22–30)
AST SERPL-CCNC: 23 U/L (ref 17–59)
BASOPHILS # BLD AUTO: 0.1 K/UL (ref 0–0.2)
BASOPHILS NFR BLD AUTO: 0 %
BUN SERPL-SCNC: 15 MG/DL (ref 9–20)
CALCIUM SPEC-MCNC: 9.1 MG/DL (ref 8.4–10.2)
CHLORIDE SERPL-SCNC: 100 MMOL/L (ref 98–107)
CO2 SERPL-SCNC: 26 MMOL/L (ref 22–30)
EOSINOPHIL # BLD AUTO: 0.3 K/UL (ref 0–0.7)
EOSINOPHIL NFR BLD AUTO: 2 %
ERYTHROCYTE [DISTWIDTH] IN BLOOD BY AUTOMATED COUNT: 4.05 M/UL (ref 4.3–5.9)
ERYTHROCYTE [DISTWIDTH] IN BLOOD: 14.2 % (ref 11.5–15.5)
GLUCOSE BLD-MCNC: 295 MG/DL (ref 70–110)
GLUCOSE SERPL-MCNC: 279 MG/DL (ref 74–99)
GLUCOSE UR QL: (no result)
HCT VFR BLD AUTO: 37.6 % (ref 39–53)
HGB BLD-MCNC: 12.8 GM/DL (ref 13–17.5)
HYALINE CASTS UR QL AUTO: 3 /LPF (ref 0–2)
INR PPP: 1 (ref ?–1.2)
LYMPHOCYTES # SPEC AUTO: 3.4 K/UL (ref 1–4.8)
LYMPHOCYTES NFR SPEC AUTO: 28 %
MCH RBC QN AUTO: 31.6 PG (ref 25–35)
MCHC RBC AUTO-ENTMCNC: 34 G/DL (ref 31–37)
MCV RBC AUTO: 92.8 FL (ref 80–100)
MONOCYTES # BLD AUTO: 0.6 K/UL (ref 0–1)
MONOCYTES NFR BLD AUTO: 5 %
NEUTROPHILS # BLD AUTO: 7.8 K/UL (ref 1.3–7.7)
NEUTROPHILS NFR BLD AUTO: 64 %
PH UR: 7 [PH] (ref 5–8)
PLATELET # BLD AUTO: 276 K/UL (ref 150–450)
POTASSIUM SERPL-SCNC: 4.5 MMOL/L (ref 3.5–5.1)
PROT SERPL-MCNC: 6.2 G/DL (ref 6.3–8.2)
PROT UR QL: (no result)
PT BLD: 10.6 SEC (ref 9–12)
RBC UR QL: 9 /HPF (ref 0–5)
SODIUM SERPL-SCNC: 132 MMOL/L (ref 137–145)
SP GR UR: 1.03 (ref 1–1.03)
UROBILINOGEN UR QL STRIP: 2 MG/DL (ref ?–2)
WBC # BLD AUTO: 12.3 K/UL (ref 3.8–10.6)
WBC # UR AUTO: 9 /HPF (ref 0–5)

## 2022-07-08 PROCEDURE — 81001 URINALYSIS AUTO W/SCOPE: CPT

## 2022-07-08 PROCEDURE — 83935 ASSAY OF URINE OSMOLALITY: CPT

## 2022-07-08 PROCEDURE — 36415 COLL VENOUS BLD VENIPUNCTURE: CPT

## 2022-07-08 PROCEDURE — 84540 ASSAY OF URINE/UREA-N: CPT

## 2022-07-08 PROCEDURE — 82140 ASSAY OF AMMONIA: CPT

## 2022-07-08 PROCEDURE — 94760 N-INVAS EAR/PLS OXIMETRY 1: CPT

## 2022-07-08 PROCEDURE — 82570 ASSAY OF URINE CREATININE: CPT

## 2022-07-08 PROCEDURE — 84560 ASSAY OF URINE/URIC ACID: CPT

## 2022-07-08 PROCEDURE — 85025 COMPLETE CBC W/AUTO DIFF WBC: CPT

## 2022-07-08 PROCEDURE — 84484 ASSAY OF TROPONIN QUANT: CPT

## 2022-07-08 PROCEDURE — 80053 COMPREHEN METABOLIC PANEL: CPT

## 2022-07-08 PROCEDURE — 87635 SARS-COV-2 COVID-19 AMP PRB: CPT

## 2022-07-08 PROCEDURE — 82746 ASSAY OF FOLIC ACID SERUM: CPT

## 2022-07-08 PROCEDURE — 84300 ASSAY OF URINE SODIUM: CPT

## 2022-07-08 PROCEDURE — 83880 ASSAY OF NATRIURETIC PEPTIDE: CPT

## 2022-07-08 PROCEDURE — 99285 EMERGENCY DEPT VISIT HI MDM: CPT

## 2022-07-08 PROCEDURE — 85730 THROMBOPLASTIN TIME PARTIAL: CPT

## 2022-07-08 PROCEDURE — 71046 X-RAY EXAM CHEST 2 VIEWS: CPT

## 2022-07-08 PROCEDURE — 71045 X-RAY EXAM CHEST 1 VIEW: CPT

## 2022-07-08 PROCEDURE — 82436 ASSAY OF URINE CHLORIDE: CPT

## 2022-07-08 PROCEDURE — 80048 BASIC METABOLIC PNL TOTAL CA: CPT

## 2022-07-08 PROCEDURE — 95816 EEG AWAKE AND DROWSY: CPT

## 2022-07-08 PROCEDURE — 82607 VITAMIN B-12: CPT

## 2022-07-08 PROCEDURE — 70450 CT HEAD/BRAIN W/O DYE: CPT

## 2022-07-08 PROCEDURE — 93005 ELECTROCARDIOGRAM TRACING: CPT

## 2022-07-08 PROCEDURE — 94640 AIRWAY INHALATION TREATMENT: CPT

## 2022-07-08 PROCEDURE — 85610 PROTHROMBIN TIME: CPT

## 2022-07-08 RX ADMIN — ATORVASTATIN CALCIUM SCH: 40 TABLET, FILM COATED ORAL at 21:39

## 2022-07-08 RX ADMIN — METOPROLOL TARTRATE SCH: 25 TABLET, FILM COATED ORAL at 21:40

## 2022-07-08 RX ADMIN — LOSARTAN POTASSIUM SCH: 25 TABLET, FILM COATED ORAL at 21:40

## 2022-07-08 RX ADMIN — IPRATROPIUM BROMIDE AND ALBUTEROL SULFATE PRN ML: .5; 3 SOLUTION RESPIRATORY (INHALATION) at 18:03

## 2022-07-08 NOTE — ED
General Adult HPI





- General


Stated complaint: AMS


Time Seen by Provider: 07/08/22 09:56


Source: patient, EMS, RN notes reviewed


Mode of arrival: EMS


Limitations: altered mental status, physical limitation





- History of Present Illness


Initial comments: 





Patient is a 77-year-old male presenting to the emergency department with 

altered mental status.  Patient is nonverbal at this time and provides no 

history.  Patient reportedly is normally alert and oriented 2.  Patient reporte

babak had a questionable choking episode and became unresponsive.  Patient has 

been with decreased responsiveness since that time.  Patient was recently in the

hospital with aspiration pneumonia.  There is also questionable abnormal 

heartbeat. 





- Related Data


                                Home Medications











 Medication  Instructions  Recorded  Confirmed


 


Thyroid,Pork [Ullin Thyroid] 90 mg PO DAILY 06/27/17 07/08/22


 


Aspirin EC [Ecotrin Low Dose] 81 mg PO HS 06/04/21 07/08/22


 


Finasteride [Proscar] 5 mg PO DAILY 06/04/21 07/08/22


 


Tamsulosin HCl [Flomax] 0.4 mg PO DAILY 06/04/21 07/08/22


 


Atorvastatin [Lipitor] 40 mg PO HS@2000 01/18/22 07/08/22


 


sitaGLIPtin [Januvia] 100 mg PO DAILY 01/18/22 07/08/22


 


Docusate [Colace] 100 mg PO HS 06/25/22 07/08/22


 


Multivitamins, Thera [Multivitamin 1 tab PO DAILY 06/25/22 07/08/22





(formulary)]   


 


Sennosides [Senokot] 8.6 mg PO DAILY 06/25/22 07/08/22


 


Ipratropium-Albuterol Nebulize 3 ml INHALATION RT-Q4H PRN 07/08/22 07/08/22





[Duoneb 0.5 mg-3 mg/3 ml Soln]   


 


Spironolactone [Aldactone] 12.5 mg PO TID 07/08/22 07/08/22








                                  Previous Rx's











 Medication  Instructions  Recorded


 


Clopidogrel [Plavix] 75 mg PO DAILY  tab 07/01/22


 


Isosorbide Mononitrate ER [Imdur] 30 mg PO DAILY  tab 07/01/22


 


Losartan [Cozaar] 25 mg PO HS  tab 07/01/22


 


Metoprolol Tartrate [Lopressor] 25 mg PO BID  tab 07/01/22


 


Furosemide [Lasix] 20 mg PO DAILY #30 tab 07/07/22


 


traMADol HCL 50 mg PO Q6H PRN #30 tab 07/07/22











                                    Allergies











Allergy/AdvReac Type Severity Reaction Status Date / Time


 


No Known Allergies Allergy   Verified 07/08/22 10:37














Review of Systems


ROS Statement: 


Those systems with pertinent positive or pertinent negative responses have been 

documented in the HPI.





ROS Other: All systems not noted in ROS Statement are negative.


Limitations: ROS unobtainable due to patients medical condition





Past Medical History


Past Medical History: CVA/TIA, Diabetes Mellitus, GERD/Reflux, Hyperlipidemia, 

Hypertension, Thyroid Disorder


Additional Past Medical History / Comment(s): States had a stroke left arm and 

leg numbness


History of Any Multi-Drug Resistant Organisms: None Reported


Additional Past Surgical History / Comment(s): cataract surgery


Past Anesthesia/Blood Transfusion Reactions: No Reported Reaction


Smoking Status: Never smoker





- Past Family History


  ** Mother


Family Medical History: Diabetes Mellitus





General Exam


General appearance: in no apparent distress, other (Drowsy.  Arouses to touch.  

Does not follow commands.)


Head exam: Present: atraumatic, normocephalic


Eye exam: Present: normal appearance, PERRL


ENT exam: Present: normal oropharynx


Neck exam: Present: normal inspection.  Absent: tenderness, meningismus


Respiratory exam: Present: normal lung sounds bilaterally


Cardiovascular Exam: Present: regular rate, normal rhythm


GI/Abdominal exam: Present: soft.  Absent: tenderness


Extremities exam: Present: normal inspection.  Absent: calf tenderness


Neurological exam: Present: altered, other (Drowsy.  Arousable to touch.  

Withdraws to pain, moves all extremities)


  ** Expanded


Neurological exam: Present: protecting the airway


Eye Response: (2) open to pain


Motor Response: (4) withdraws to pain


Verbal Response: (1) no verbal response


Psychiatric exam: Present: flat affect


Skin exam: Present: normal color





Course


                                   Vital Signs











  07/08/22 07/08/22





  09:57 11:40


 


Temperature 98.1 F 


 


Pulse Rate 72 98


 


Respiratory 20 16





Rate  


 


Blood Pressure 157/83 121/60


 


O2 Sat by Pulse 97 98





Oximetry  














- Reevaluation(s)


Reevaluation #1: 





07/08/22 10:04


EMS monitor she shows sinus rhythm





EKG Findings





- EKG Comments:


EKG Findings:: Sinus rhythm with sinus arrhythmia.  Rate 81.  .  . 

 .  QTC 43.  Normal axis.  Normal QRS.  T wave inversion in V6.





Medical Decision Making





- Medical Decision Making





Patient reevaluated without significant change.  Patient still maintaining 

airway.  Case discussed with Dr. Wellington, who will admit covering hospital call.  

Consult will be placed for neurology.





- Lab Data


Result diagrams: 


                                 07/08/22 10:10





                                 07/08/22 10:10


                                   Lab Results











  07/08/22 07/08/22 07/08/22 Range/Units





  10:10 10:10 10:10 


 


WBC  12.3 H    (3.8-10.6)  k/uL


 


RBC  4.05 L    (4.30-5.90)  m/uL


 


Hgb  12.8 L    (13.0-17.5)  gm/dL


 


Hct  37.6 L    (39.0-53.0)  %


 


MCV  92.8    (80.0-100.0)  fL


 


MCH  31.6    (25.0-35.0)  pg


 


MCHC  34.0    (31.0-37.0)  g/dL


 


RDW  14.2    (11.5-15.5)  %


 


Plt Count  276    (150-450)  k/uL


 


MPV  8.4    


 


Neutrophils %  64    %


 


Lymphocytes %  28    %


 


Monocytes %  5    %


 


Eosinophils %  2    %


 


Basophils %  0    %


 


Neutrophils #  7.8 H    (1.3-7.7)  k/uL


 


Lymphocytes #  3.4    (1.0-4.8)  k/uL


 


Monocytes #  0.6    (0-1.0)  k/uL


 


Eosinophils #  0.3    (0-0.7)  k/uL


 


Basophils #  0.1    (0-0.2)  k/uL


 


PT   10.6   (9.0-12.0)  sec


 


INR   1.0   (<1.2)  


 


APTT   22.6   (22.0-30.0)  sec


 


Sodium     (137-145)  mmol/L


 


Potassium     (3.5-5.1)  mmol/L


 


Chloride     ()  mmol/L


 


Carbon Dioxide     (22-30)  mmol/L


 


Anion Gap     mmol/L


 


BUN     (9-20)  mg/dL


 


Creatinine     (0.66-1.25)  mg/dL


 


Est GFR (CKD-EPI)AfAm     (>60 ml/min/1.73 sqM)  


 


Est GFR (CKD-EPI)NonAf     (>60 ml/min/1.73 sqM)  


 


Glucose     (74-99)  mg/dL


 


POC Glucose (mg/dL)     ()  mg/dL


 


POC Glu Operater ID     


 


Calcium     (8.4-10.2)  mg/dL


 


Total Bilirubin     (0.2-1.3)  mg/dL


 


AST     (17-59)  U/L


 


ALT     (4-49)  U/L


 


Alkaline Phosphatase     ()  U/L


 


Troponin I     (0.000-0.034)  ng/mL


 


Total Protein     (6.3-8.2)  g/dL


 


Albumin     (3.5-5.0)  g/dL


 


Urine Color    Yellow  


 


Urine Appearance    Clear  (Clear)  


 


Urine pH    7.0  (5.0-8.0)  


 


Ur Specific Gravity    1.027  (1.001-1.035)  


 


Urine Protein    1+ H  (Negative)  


 


Urine Glucose (UA)    4+ H  (Negative)  


 


Urine Ketones    Negative  (Negative)  


 


Urine Blood    Negative  (Negative)  


 


Urine Nitrite    Negative  (Negative)  


 


Urine Bilirubin    Negative  (Negative)  


 


Urine Urobilinogen    2.0  (<2.0)  mg/dL


 


Ur Leukocyte Esterase    Small H  (Negative)  


 


Urine RBC    9 H  (0-5)  /hpf


 


Urine WBC    9 H  (0-5)  /hpf


 


Hyaline Casts    3 H  (0-2)  /lpf


 


Urine Mucus    Occasional H  (None)  /hpf














  07/08/22 07/08/22 07/08/22 Range/Units





  10:10 10:10 10:14 


 


WBC     (3.8-10.6)  k/uL


 


RBC     (4.30-5.90)  m/uL


 


Hgb     (13.0-17.5)  gm/dL


 


Hct     (39.0-53.0)  %


 


MCV     (80.0-100.0)  fL


 


MCH     (25.0-35.0)  pg


 


MCHC     (31.0-37.0)  g/dL


 


RDW     (11.5-15.5)  %


 


Plt Count     (150-450)  k/uL


 


MPV     


 


Neutrophils %     %


 


Lymphocytes %     %


 


Monocytes %     %


 


Eosinophils %     %


 


Basophils %     %


 


Neutrophils #     (1.3-7.7)  k/uL


 


Lymphocytes #     (1.0-4.8)  k/uL


 


Monocytes #     (0-1.0)  k/uL


 


Eosinophils #     (0-0.7)  k/uL


 


Basophils #     (0-0.2)  k/uL


 


PT     (9.0-12.0)  sec


 


INR     (<1.2)  


 


APTT     (22.0-30.0)  sec


 


Sodium  132 L    (137-145)  mmol/L


 


Potassium  4.5    (3.5-5.1)  mmol/L


 


Chloride  100    ()  mmol/L


 


Carbon Dioxide  26    (22-30)  mmol/L


 


Anion Gap  6    mmol/L


 


BUN  15    (9-20)  mg/dL


 


Creatinine  0.87    (0.66-1.25)  mg/dL


 


Est GFR (CKD-EPI)AfAm  >90    (>60 ml/min/1.73 sqM)  


 


Est GFR (CKD-EPI)NonAf  82    (>60 ml/min/1.73 sqM)  


 


Glucose  279 H    (74-99)  mg/dL


 


POC Glucose (mg/dL)    295 H  ()  mg/dL


 


POC Glu Operater ID    Saray Angel  


 


Calcium  9.1    (8.4-10.2)  mg/dL


 


Total Bilirubin  0.8    (0.2-1.3)  mg/dL


 


AST  23    (17-59)  U/L


 


ALT  27    (4-49)  U/L


 


Alkaline Phosphatase  97    ()  U/L


 


Troponin I   0.148 H*   (0.000-0.034)  ng/mL


 


Total Protein  6.2 L    (6.3-8.2)  g/dL


 


Albumin  3.4 L    (3.5-5.0)  g/dL


 


Urine Color     


 


Urine Appearance     (Clear)  


 


Urine pH     (5.0-8.0)  


 


Ur Specific Gravity     (1.001-1.035)  


 


Urine Protein     (Negative)  


 


Urine Glucose (UA)     (Negative)  


 


Urine Ketones     (Negative)  


 


Urine Blood     (Negative)  


 


Urine Nitrite     (Negative)  


 


Urine Bilirubin     (Negative)  


 


Urine Urobilinogen     (<2.0)  mg/dL


 


Ur Leukocyte Esterase     (Negative)  


 


Urine RBC     (0-5)  /hpf


 


Urine WBC     (0-5)  /hpf


 


Hyaline Casts     (0-2)  /lpf


 


Urine Mucus     (None)  /hpf














- Radiology Data


Radiology results: report reviewed (CT brain shows similar mild hydrocephalus 

likely from atrophy.  Correlate to exclude a component of NPH.  Unchanged enceph

alomalacia.  No acute intercranial abnormality.), image reviewed (Chest x-ray 

does show some interstitial edema.  Small effusion.  Correlate for CHF)





Disposition


Clinical Impression: 


 Altered mental status, Syncope





Disposition: ADMITTED AS IP TO THIS HOSP


Condition: Serious


Is patient prescribed a controlled substance at d/c from ED?: No


Referrals: 


Nonstaff,Physician [Primary Care Provider] - 1-2 days


Time of Disposition: 12:51

## 2022-07-08 NOTE — P.HPIM
History of Present Illness


H&P Date: 07/08/22


Patient is a 77-year-old male with diabetes, prior CVA, GERD, hypertension, 

dyslipidemia, and hypothyroidism who was sent to the emergency room after having

a choking episode of becoming unresponsive at his skilled nursing facility.  

Patient had recently been hospitalized here from 6/25 through 7/7 for a similar 

event.  He was found to have dysphagia and had a choking episode.  He had been 

placed on a modified diet and had done well during his hospital stay.  He had 

also been found to have acute systolic congestive heart failure with an ejection

fraction of 25-30% and a non-ST segment elevated myocardial infarction.  On 

arrival his vital signs are within normal limits.  Laboratory analysis revealed 

a white blood cell count of 12.3 up mildly from yesterday.  Sodium level was 

stable at 132.  Glucose was elevated at 279.  His troponin was mildly elevated 

at 0.148 but down trending from his last hospital stay were maxed at 13.  Head 

CT was unchanged and shows mild hydrocephalus was unchanged encephalomalacia to 

the anterior left frontal lobe.  Chest x-ray demonstrated mild interstitial 

edema.  EKG demonstrated sinus rhythm with PACs.  He was placed in observation. 





Patient seen and examined at bedside. He will open eyes to sternal rub and 

painful stimuli but is not verbalizing.  All information is obtained from 

thorough records review as patient is normally responsive and no family is 

present at bedside.  This includes a thorough review of his records from hospit

alization 6/25 through 7/7.





Per nursing home notes patient was awake this morning and was eating breakfast 

with therapy when he began choking, had a coughing episode, and was 

unresponsive.





Unable to obtain review of systems the patient is not speaking.





Vital signs reviewed


General: nontoxic, moderate distress, appears at stated age


Derm:  warm, dry


Head: atraumatic, normocephalic, symmetric


Eyes: EOMI, no lid lag, anicteric sclera, pupils equal round reactive to light


ENT: Nose and ears atraumatic, no thrush,  no pharyngeal erythema


Neck: No thyromegaly, no cervical lymphadenopathy, trachea midline, supple


Mouth: no lip lesion, mucus membranes dry


Cardiovascular: S1S2 reg, no murmur, positive posterior tibial pulse bilateral, 

no edema, capillary refill less than 2 seconds


Lungs:Decreasd breathsounds bilateral, no rhonchi, no rales, no wheeze, no 

accessory muscle use


Abdominal: soft,  nontender to palpation, no guarding, no appreciable 

organomegaly, normal bowel sounds


Ext: + gross muscle atrophy,   no contractures


Neuro:  PERRL, + withdrawal to pain in all 4 extremities, not followig commands,

no tremors noted, babinski equivical 


Psych: lethargic, protencting airway





Assessment/Plan: 


Altered mentation


- CVA vs metabolic vs hypoxic episode 


- neuro check, seizure precuations


- consult neuro 


- NPO


- consult PT/OT/speech 





Dysphagia 


- NPO for now, when eating needs a dyshagia level 3 diet with nectar thick 

liquid





Diabetes mellitus type 2


-Continue with Levemir


-Continue sliding scale insulin


-Follow blood sugars





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


-Continue with patient's beta blocker, ACE inhibitor, Lasix, and Aldactone


Strict I's and O's





Recent SIADH


-Patient currently nothing by mouth as he is not awake enough to eat


-Patient will need 1200 mL fluid restriction once eating





Urinary retention


BPH


- miantain ramos 


- flomax





Anemia


- dilutional from recent hospital stay, improving


- follow CBC





Chronic:


Hypertension


Dyslipidemia


Hypothyroidism


Recent aspiration pneumonia





The patient is admitted with an anticipated greater than 2 midnight stay for 

evaluation of altered mentation.


Surrogate decision-maker: daughter


CODE STATUS:DNR


DVT prophylaxis: SCDs


Discussed with: Nursing, Dr. Corado


Anticipated discharge date: in 1-2 days


Anticipated discharge place: home


A total of 45 minutes was spent on the care of this complex patient more than 

50% of the time was spent in counseling and care coordination.








Past Medical History


Past Medical History: Coronary Artery Disease (CAD), Heart Failure, CVA/TIA, 

Diabetes Mellitus, GERD/Reflux, Hyperlipidemia, Hypertension, Thyroid Disorder


Additional Past Medical History / Comment(s): States had a stroke left arm and 

leg numbness, admit 06/25 aspiration pneumonia elevated trop, syncopal episode, 

dysphagia


History of Any Multi-Drug Resistant Organisms: None Reported


Additional Past Surgical History / Comment(s): cataract surgery


Past Anesthesia/Blood Transfusion Reactions: No Reported Reaction


Past Psychological History: No Psychological Hx Reported


Smoking Status: Never smoker


Past Alcohol Use History: None Reported


Past Drug Use History: None Reported





- Past Family History


  ** Mother


Family Medical History: Diabetes Mellitus





Medications and Allergies


                                Home Medications











 Medication  Instructions  Recorded  Confirmed  Type


 


Thyroid,Pork [Dyess Thyroid] 90 mg PO DAILY 06/27/17 07/08/22 History


 


Aspirin EC [Ecotrin Low Dose] 81 mg PO HS 06/04/21 07/08/22 History


 


Finasteride [Proscar] 5 mg PO DAILY 06/04/21 07/08/22 History


 


Tamsulosin HCl [Flomax] 0.4 mg PO DAILY 06/04/21 07/08/22 History


 


Atorvastatin [Lipitor] 40 mg PO HS@2000 01/18/22 07/08/22 History


 


sitaGLIPtin [Januvia] 100 mg PO DAILY 01/18/22 07/08/22 History


 


Docusate [Colace] 100 mg PO HS 06/25/22 07/08/22 History


 


Multivitamins, Thera [Multivitamin 1 tab PO DAILY 06/25/22 07/08/22 History





(formulary)]    


 


Sennosides [Senokot] 8.6 mg PO DAILY 06/25/22 07/08/22 History


 


Clopidogrel [Plavix] 75 mg PO DAILY  tab 07/01/22 07/08/22 Rx


 


Isosorbide Mononitrate ER [Imdur] 30 mg PO DAILY  tab 07/01/22 07/08/22 Rx


 


Losartan [Cozaar] 25 mg PO HS  tab 07/01/22 07/08/22 Rx


 


Metoprolol Tartrate [Lopressor] 25 mg PO BID  tab 07/01/22 07/08/22 Rx


 


Furosemide [Lasix] 20 mg PO DAILY #30 tab 07/07/22 07/08/22 Rx


 


traMADol HCL 50 mg PO Q6H PRN #30 tab 07/07/22 07/08/22 Rx


 


Ipratropium-Albuterol Nebulize 3 ml INHALATION RT-Q4H PRN 07/08/22 07/08/22 

History





[Duoneb 0.5 mg-3 mg/3 ml Soln]    


 


Spironolactone [Aldactone] 12.5 mg PO TID 07/08/22 07/08/22 History








                                    Allergies











Allergy/AdvReac Type Severity Reaction Status Date / Time


 


No Known Allergies Allergy   Verified 07/08/22 10:37














Physical Exam


Osteopathic Statement: *.  No significant issues noted on an osteopathic 

structural exam other than those noted in the History and Physical/Consult.


Vitals: 


                                   Vital Signs











  Temp Pulse Resp BP Pulse Ox


 


 07/08/22 14:24   79  20   94 L


 


 07/08/22 13:00   80  22  140/81  96


 


 07/08/22 12:55  98.2 F  81  20   97


 


 07/08/22 11:40   98  16  121/60  98


 


 07/08/22 09:57  98.1 F  72  20  157/83  97








                                Intake and Output











 07/08/22 07/08/22 07/08/22





 06:59 14:59 22:59


 


Other:   


 


  Weight  72.575 kg 














Results


CBC & Chem 7: 


                                 07/08/22 10:10





                                 07/08/22 10:10


Labs: 


                  Abnormal Lab Results - Last 24 Hours (Table)











  07/08/22 07/08/22 07/08/22 Range/Units





  10:10 10:10 10:10 


 


WBC  12.3 H    (3.8-10.6)  k/uL


 


RBC  4.05 L    (4.30-5.90)  m/uL


 


Hgb  12.8 L    (13.0-17.5)  gm/dL


 


Hct  37.6 L    (39.0-53.0)  %


 


Neutrophils #  7.8 H    (1.3-7.7)  k/uL


 


Sodium    132 L  (137-145)  mmol/L


 


Glucose    279 H  (74-99)  mg/dL


 


POC Glucose (mg/dL)     ()  mg/dL


 


Troponin I     (0.000-0.034)  ng/mL


 


Total Protein    6.2 L  (6.3-8.2)  g/dL


 


Albumin    3.4 L  (3.5-5.0)  g/dL


 


Urine Protein   1+ H   (Negative)  


 


Urine Glucose (UA)   4+ H   (Negative)  


 


Ur Leukocyte Esterase   Small H   (Negative)  


 


Urine RBC   9 H   (0-5)  /hpf


 


Urine WBC   9 H   (0-5)  /hpf


 


Hyaline Casts   3 H   (0-2)  /lpf


 


Urine Mucus   Occasional H   (None)  /hpf














  07/08/22 07/08/22 07/08/22 Range/Units





  10:10 10:14 14:48 


 


WBC     (3.8-10.6)  k/uL


 


RBC     (4.30-5.90)  m/uL


 


Hgb     (13.0-17.5)  gm/dL


 


Hct     (39.0-53.0)  %


 


Neutrophils #     (1.3-7.7)  k/uL


 


Sodium     (137-145)  mmol/L


 


Glucose     (74-99)  mg/dL


 


POC Glucose (mg/dL)   295 H   ()  mg/dL


 


Troponin I  0.148 H*   0.127 H*  (0.000-0.034)  ng/mL


 


Total Protein     (6.3-8.2)  g/dL


 


Albumin     (3.5-5.0)  g/dL


 


Urine Protein     (Negative)  


 


Urine Glucose (UA)     (Negative)  


 


Ur Leukocyte Esterase     (Negative)  


 


Urine RBC     (0-5)  /hpf


 


Urine WBC     (0-5)  /hpf


 


Hyaline Casts     (0-2)  /lpf


 


Urine Mucus     (None)  /hpf

## 2022-07-08 NOTE — P.CNNES
History of Present Illness


Consult date: 07/08/22


Requesting physician: Andrea Johnson


Reason for Consult: syncope, ams


History of Present Illness: 


This is a 77 -year-old gentleman with history of stroke (left frontal), 

diabetes, hypertension, dyslipidemia, hypothyroidism, pneumonia who presented 

emergency department for  mental status.  Neurology is consulted for syncopal 

episode.  Seem to the patient had a questionable token episodes and became 

unresponsiveness patient is alert oriented 2 at baseline.  Patient was recently

discharged from our facility for aspiration pneumonia and was his nursing home 

rehab and there had episode of loss of consciousness.  According to nurse, no 

reported jerking of any extremities.  He would withdrawal to painful stimuli to 

nursing staff.


Some of the patient home medications are aspirin 81 mg, Plavix 75 mg, Lipitor 40

mg.





Some of the workup in our facility during this hospital visit consisted of:


Initial vital signs was blood pressure of 157/83, heart rate is 72, respiratory 

of 20, temperature of 98.1 Fahrenheit axillary and pulse ox of 97% room air.  


White blood cell is 12.3 thousand slightly neutrophilic.


Sodium is 132, glucose is 279, troponin is slightly elevated O.148.


Urinalysis seems questionable for urinary tract infection


Coronavirus seizures nondetected.


CT of the head is reported as similar mild hydrocephalus likely central cerebral

atrophy.  Correlate to exclude a component of normal pressure hydrocephalus.  

Unchanged on encephalomalacia anterior left frontal lobe that could represent 

sequela of prior vascular or traumatic insults.  No acute intracranial 

abnormality seen.  I personally reviewed the CT of the head and I agree with the

report.











Review of Systems


Review of system:  The 12 point system was reviewed and apparent positive and 

negative per HPI.








Past Medical History


Past Medical History: CVA/TIA, Diabetes Mellitus, GERD/Reflux, Hyperlipidemia, 

Hypertension, Thyroid Disorder


Additional Past Medical History / Comment(s): States had a stroke left arm and 

leg numbness


History of Any Multi-Drug Resistant Organisms: None Reported


Additional Past Surgical History / Comment(s): cataract surgery


Past Anesthesia/Blood Transfusion Reactions: No Reported Reaction


Smoking Status: Never smoker





- Past Family History


  ** Mother


Family Medical History: Diabetes Mellitus





Medications and Allergies


                                Home Medications











 Medication  Instructions  Recorded  Confirmed  Type


 


Thyroid,Pork [[place] Thyroid] 90 mg PO DAILY 06/27/17 07/08/22 History


 


Aspirin EC [Ecotrin Low Dose] 81 mg PO HS 06/04/21 07/08/22 History


 


Finasteride [Proscar] 5 mg PO DAILY 06/04/21 07/08/22 History


 


Tamsulosin HCl [Flomax] 0.4 mg PO DAILY 06/04/21 07/08/22 History


 


Atorvastatin [Lipitor] 40 mg PO HS@2000 01/18/22 07/08/22 History


 


sitaGLIPtin [Januvia] 100 mg PO DAILY 01/18/22 07/08/22 History


 


Docusate [Colace] 100 mg PO HS 06/25/22 07/08/22 History


 


Multivitamins, Thera [Multivitamin 1 tab PO DAILY 06/25/22 07/08/22 History





(formulary)]    


 


Sennosides [Senokot] 8.6 mg PO DAILY 06/25/22 07/08/22 History


 


Clopidogrel [Plavix] 75 mg PO DAILY  tab 07/01/22 07/08/22 Rx


 


Isosorbide Mononitrate ER [Imdur] 30 mg PO DAILY  tab 07/01/22 07/08/22 Rx


 


Losartan [Cozaar] 25 mg PO HS  tab 07/01/22 07/08/22 Rx


 


Metoprolol Tartrate [Lopressor] 25 mg PO BID  tab 07/01/22 07/08/22 Rx


 


Furosemide [Lasix] 20 mg PO DAILY #30 tab 07/07/22 07/08/22 Rx


 


traMADol HCL 50 mg PO Q6H PRN #30 tab 07/07/22 07/08/22 Rx


 


Ipratropium-Albuterol Nebulize 3 ml INHALATION RT-Q4H PRN 07/08/22 07/08/22 

History





[Duoneb 0.5 mg-3 mg/3 ml Soln]    


 


Spironolactone [Aldactone] 12.5 mg PO TID 07/08/22 07/08/22 History








                                    Allergies











Allergy/AdvReac Type Severity Reaction Status Date / Time


 


No Known Allergies Allergy   Verified 07/08/22 10:37














Physical Examination





- Vital Signs


Vital Signs: 


                                   Vital Signs











  Temp Pulse Resp BP Pulse Ox


 


 07/08/22 14:24   79  20   94 L


 


 07/08/22 13:00   80  22  140/81  96


 


 07/08/22 12:55  98.2 F  81  20   97


 


 07/08/22 11:40   98  16  121/60  98


 


 07/08/22 09:57  98.1 F  72  20  157/83  97








                                Intake and Output











 07/07/22 07/08/22 07/08/22





 22:59 06:59 14:59


 


Other:   


 


  Weight   72.575 kg











GENERAL: The patient is lying in bed and is not in acute distress.


CHEST: The heart rate is regular rate rhythm.   No murmurs to auscultation.  


LUNG: Clear to auscultation bilaterally no wheezing noted throughout.  Not 

labored breathing.


ABDOMEN/GI: Bowel sounds present in all 4 quadrants. No tenderness to palpation 

throughout.





NEUROLOGICAL:


Limited because of his condition.


Higher mental function: The patient is severely drowsy but briefly opens eyes.  

Is not verbalizing or following commands. 


Cranial nerves: The pupils are round, equal and reactive to light.  No facial 

weakness.  Otherwise rest are limited.    


Motor: The strength is limited because of his condition/cooperation.  Normal 

tone and bulk.  No spontaneous movement.


Cerebellum: Unable to assess.


Sensation: Unable to assess.


Reflexes (right/left): 1+ throughout.


Plantars are mute  bilaterally. 








Results





- Laboratory Findings


CBC and BMP: 


                                 07/08/22 10:10





                                 07/08/22 10:10


Abnormal Lab Findings: 


                                  Abnormal Labs











  07/08/22 07/08/22 07/08/22





  10:10 10:10 10:10


 


WBC  12.3 H  


 


RBC  4.05 L  


 


Hgb  12.8 L  


 


Hct  37.6 L  


 


Neutrophils #  7.8 H  


 


Sodium    132 L


 


Glucose    279 H


 


POC Glucose (mg/dL)   


 


Troponin I   


 


Total Protein    6.2 L


 


Albumin    3.4 L


 


Urine Protein   1+ H 


 


Urine Glucose (UA)   4+ H 


 


Ur Leukocyte Esterase   Small H 


 


Urine RBC   9 H 


 


Urine WBC   9 H 


 


Hyaline Casts   3 H 


 


Urine Mucus   Occasional H 














  07/08/22 07/08/22





  10:10 10:14


 


WBC  


 


RBC  


 


Hgb  


 


Hct  


 


Neutrophils #  


 


Sodium  


 


Glucose  


 


POC Glucose (mg/dL)   295 H


 


Troponin I  0.148 H* 


 


Total Protein  


 


Albumin  


 


Urine Protein  


 


Urine Glucose (UA)  


 


Ur Leukocyte Esterase  


 


Urine RBC  


 


Urine WBC  


 


Hyaline Casts  


 


Urine Mucus  














Assessment and Plan


Assessment: 





Syncopal episode.  Unsure cause.  Rule out seizure


History of stroke (left frontal)


Recent hospital visit for aspiration pneumonia


Diabetes


Hypertension


Dyslipidemia


hypothyroidism





Plan: 


I ordered routine EEG.


Patient had a recent TSH is 2.340, hemoglobin A1c is 7.1 with a within the last 

30 days and does not need to be repeated.


I ordered ammonia level, vitamin B-12, folate


On CT head he has possible NPH which is unchanged on this current CT.  Recommend

 patient to follow-up with neurologist for large volume tap and if improvement 

consider  shunt.


Every 4 hours neuro checks


Cardiology is consulted


We'll defer the rest of the medical management to primary team





The plan is discussed with nurse.


Thank you for the consultation.





Kashif Corado M.D.


Neuro-hospitalist








Time with Patient: Greater than 30
Never

## 2022-07-08 NOTE — EEG
ELECTROENCEPHALOGRAM REPORT



DATE OF SERVICE:

07/08/2022.



CLINICAL HISTORY:

This is a 77-year-old gentleman with who presents to the emergency department 
because

of altered mental status and suspected syncopal episodes.  The video EEG is 
obtained to

evaluate for seizure and epileptiform activity.



Relevant medication: The patient is not on any antiepileptic drugs.



EEG TYPE:

A routine 21-channel EEG is performed with video using the 10/20 electrode 
placement

system.



DESCRIPTION:

Wakefulness is only obtained.  During awake state, the background consists of 
low to

moderate voltage of 5 to 6 hertz activity.  There is no physiological sleep

architecture seen.



There is no focal slowing.



Interictal and ictal is none.



ACTIVATION PROCEDURE:

Photic stimulation and hyperventilation are not performed.



CLINICAL INTERPRETATION:

This is an abnormal routine EEG.  The background slowing is suggestive of 
moderate

encephalopathy of unknown etiology.  There is no focal slowing, epileptiform 
discharge

or seizure on the EEG.  Clinical correlation is recommended.





[facility] / LAURA: [id number] / Job#: [id number]

MTDD

## 2022-07-08 NOTE — XR
EXAMINATION TYPE: XR chest 2V

 

DATE OF EXAM: 7/8/2022

 

COMPARISON: 7/5/2022

 

HISTORY: 77-year-old male confusion, altered mental status

 

TECHNIQUE:  AP and lateral views

 

FINDINGS:  

Heart is mildly enlarged. Continued diffuse interstitial and vascular density. Small bilateral pleura
l effusions on lateral view. Changes similar to slightly increased.

 

 

IMPRESSION:  

Correlate for CHF with mild interstitial edema. Small pleural effusions with adjacent atelectasis and
/or consolidation.

## 2022-07-09 LAB
ALBUMIN SERPL-MCNC: 3.5 G/DL (ref 3.5–5)
ALP SERPL-CCNC: 97 U/L (ref 38–126)
ALT SERPL-CCNC: 25 U/L (ref 4–49)
ANION GAP SERPL CALC-SCNC: 9 MMOL/L
AST SERPL-CCNC: 25 U/L (ref 17–59)
BASOPHILS # BLD AUTO: 0 K/UL (ref 0–0.2)
BASOPHILS NFR BLD AUTO: 0 %
BUN SERPL-SCNC: 16 MG/DL (ref 9–20)
CALCIUM SPEC-MCNC: 9.2 MG/DL (ref 8.4–10.2)
CHLORIDE SERPL-SCNC: 96 MMOL/L (ref 98–107)
CO2 SERPL-SCNC: 27 MMOL/L (ref 22–30)
EOSINOPHIL # BLD AUTO: 0 K/UL (ref 0–0.7)
EOSINOPHIL NFR BLD AUTO: 0 %
ERYTHROCYTE [DISTWIDTH] IN BLOOD BY AUTOMATED COUNT: 4.26 M/UL (ref 4.3–5.9)
ERYTHROCYTE [DISTWIDTH] IN BLOOD: 13.8 % (ref 11.5–15.5)
GLUCOSE BLD-MCNC: 288 MG/DL (ref 70–110)
GLUCOSE SERPL-MCNC: 292 MG/DL (ref 74–99)
GLUCOSE UR QL: (no result)
HCT VFR BLD AUTO: 39.4 % (ref 39–53)
HGB BLD-MCNC: 13 GM/DL (ref 13–17.5)
HYALINE CASTS UR QL AUTO: 1 /LPF (ref 0–2)
KETONES UR QL STRIP.AUTO: (no result)
LYMPHOCYTES # SPEC AUTO: 1.5 K/UL (ref 1–4.8)
LYMPHOCYTES NFR SPEC AUTO: 9 %
MCH RBC QN AUTO: 30.4 PG (ref 25–35)
MCHC RBC AUTO-ENTMCNC: 32.9 G/DL (ref 31–37)
MCV RBC AUTO: 92.6 FL (ref 80–100)
MONOCYTES # BLD AUTO: 0.8 K/UL (ref 0–1)
MONOCYTES NFR BLD AUTO: 5 %
NEUTROPHILS # BLD AUTO: 14 K/UL (ref 1.3–7.7)
NEUTROPHILS NFR BLD AUTO: 85 %
PH UR: 5.5 [PH] (ref 5–8)
PLATELET # BLD AUTO: 286 K/UL (ref 150–450)
POTASSIUM SERPL-SCNC: 4.5 MMOL/L (ref 3.5–5.1)
PROT SERPL-MCNC: 6.4 G/DL (ref 6.3–8.2)
RBC UR QL: 1 /HPF (ref 0–5)
SODIUM SERPL-SCNC: 132 MMOL/L (ref 137–145)
SP GR UR: 1.03 (ref 1–1.03)
UROBILINOGEN UR QL STRIP: <2 MG/DL (ref ?–2)
WBC # BLD AUTO: 16.4 K/UL (ref 3.8–10.6)
WBC #/AREA URNS HPF: 5 /HPF (ref 0–5)

## 2022-07-09 RX ADMIN — METOPROLOL TARTRATE SCH: 25 TABLET, FILM COATED ORAL at 19:39

## 2022-07-09 RX ADMIN — TAMSULOSIN HYDROCHLORIDE SCH: 0.4 CAPSULE ORAL at 08:33

## 2022-07-09 RX ADMIN — SODIUM CHLORIDE SCH MLS/HR: 900 INJECTION, SOLUTION INTRAVENOUS at 17:10

## 2022-07-09 RX ADMIN — FINASTERIDE SCH: 5 TABLET, FILM COATED ORAL at 08:26

## 2022-07-09 RX ADMIN — ISOSORBIDE MONONITRATE SCH: 30 TABLET, EXTENDED RELEASE ORAL at 08:27

## 2022-07-09 RX ADMIN — THYROID, PORCINE SCH: 30 TABLET ORAL at 08:33

## 2022-07-09 RX ADMIN — LOSARTAN POTASSIUM SCH: 25 TABLET, FILM COATED ORAL at 19:39

## 2022-07-09 RX ADMIN — IPRATROPIUM BROMIDE AND ALBUTEROL SULFATE PRN ML: .5; 3 SOLUTION RESPIRATORY (INHALATION) at 08:23

## 2022-07-09 RX ADMIN — PANTOPRAZOLE SODIUM SCH MG: 40 INJECTION, POWDER, FOR SOLUTION INTRAVENOUS at 09:09

## 2022-07-09 RX ADMIN — ASPIRIN SCH MG: 300 SUPPOSITORY RECTAL at 15:16

## 2022-07-09 RX ADMIN — METOPROLOL TARTRATE SCH: 25 TABLET, FILM COATED ORAL at 08:32

## 2022-07-09 RX ADMIN — THERA TABS SCH: TAB at 08:32

## 2022-07-09 RX ADMIN — ATORVASTATIN CALCIUM SCH: 40 TABLET, FILM COATED ORAL at 19:39

## 2022-07-09 RX ADMIN — IPRATROPIUM BROMIDE AND ALBUTEROL SULFATE PRN ML: .5; 3 SOLUTION RESPIRATORY (INHALATION) at 20:02

## 2022-07-09 RX ADMIN — CEFAZOLIN SCH MLS/HR: 330 INJECTION, POWDER, FOR SOLUTION INTRAMUSCULAR; INTRAVENOUS at 15:59

## 2022-07-09 RX ADMIN — LEVETIRACETAM SCH MLS/HR: 100 INJECTION, SOLUTION, CONCENTRATE INTRAVENOUS at 20:07

## 2022-07-09 NOTE — P.PN
Subjective


Progress Note Date: 07/09/22


I spoke with the patient's night that had him yesterday and no clinical seizures

but to painful stimuli was withdrawing the left side more the right side.  He 

continues to be unresponsive.


He has a mild low grade fever of Tmax 








Objective





- Vital Signs


Vital signs: 


                                   Vital Signs











Temp  100.6 F H  07/09/22 08:00


 


Pulse  88   07/09/22 08:34


 


Resp  16   07/09/22 08:00


 


BP  143/65   07/09/22 08:00


 


Pulse Ox  96   07/09/22 08:00


 


FiO2  21   07/08/22 18:03








                                 Intake & Output











 07/08/22 07/09/22 07/09/22





 18:59 06:59 18:59


 


Output Total  1600 


 


Balance  -1600 


 


Weight 72.575 kg  


 


Output:   


 


  Urine  1600 


 


Other:   


 


  Voiding Method  Indwelling Catheter Indwelling Catheter














- Exam





GENERAL: The patient is lying in bed and is not in acute distress.





NEUROLOGICAL:


Limited because of his condition.


Higher mental function: The patient is significantly drowsy but briefly opens 

eyes.  Is not verbalizing or following commands. 


Cranial nerves: The pupils are round, equal and reactive to light.  Primary gaze

is midline.  Has mild right lower facial droop.   Otherwise rest are limited.   




Motor: The strength is limited because of his condition/cooperation.  To painful

stimuli withdrawing the left upper and lower more the right.    Normal tone and 

bulk.  No spontaneous movement.


Cerebellum: Unable to assess.


Sensation: Unable to assess light touch.


Plantars are mute  bilaterally.





Some of the workup in our facility during this hospital visit consisted of:


Urinalysis seems questionable for urinary tract infection


Coronavirus seizures nondetected.


CT of the head is reported as similar mild hydrocephalus likely central cerebral

atrophy.  Correlate to exclude a component of normal pressure hydrocephalus.  

Unchanged on encephalomalacia anterior left frontal lobe that could represent 

sequela of prior vascular or traumatic insults.  No acute intracranial 

abnormality seen.  I personally reviewed the CT of the head and I agree with the

report. 


EEG: This is an abnormal routine EEG.  The brachial slowing suggestive of 

moderate encephalopathy of unknown etiology.  There is no focal slowing, 

epileptiform discharges or seizure in the EEG








- Labs


CBC & Chem 7: 


                                 07/09/22 07:05





                                 07/09/22 07:05


Labs: 


                  Abnormal Lab Results - Last 24 Hours (Table)











  07/08/22 07/08/22 07/09/22 Range/Units





  14:48 17:49 07:05 


 


WBC    16.4 H  (3.8-10.6)  k/uL


 


RBC    4.26 L  (4.30-5.90)  m/uL


 


Neutrophils #    14.0 H  (1.3-7.7)  k/uL


 


Sodium     (137-145)  mmol/L


 


Chloride     ()  mmol/L


 


Glucose     (74-99)  mg/dL


 


Troponin I  0.127 H*  0.114 H*   (0.000-0.034)  ng/mL














  07/09/22 Range/Units





  07:05 


 


WBC   (3.8-10.6)  k/uL


 


RBC   (4.30-5.90)  m/uL


 


Neutrophils #   (1.3-7.7)  k/uL


 


Sodium  132 L  (137-145)  mmol/L


 


Chloride  96 L  ()  mmol/L


 


Glucose  292 H  (74-99)  mg/dL


 


Troponin I   (0.000-0.034)  ng/mL














Assessment and Plan


Assessment: 





Encephalopathy of unknown etiology.  Rule out stroke since on examination 

withdrawing the left side more than the right side to painful stimuli vs ?e

ncephalitis (developed low grade fever with leukocytosis which both can be 

reactive from aspiration pneumonia from stroke).  EEG is negative for seizures.


History of stroke (left frontal)


Recent hospital visit for aspiration pneumonia


Diabetes


Hypertension


Dyslipidemia


hypothyroidism





Plan: 





I ordered MRI of the brain with and without urgently.


I consulted anesthesiology team STAT for lumbar puncture.  I spoke with step-

daughter (Nabila) and she agreed to pursue with Lumbar puncture.


This does not seem like meningitis but ?encephalitis (initially presented 

without any fevers) vs aspiration pneumonia and consulted ID and we'll defer 

antibiotic regimen to the ID team.  In the meantime I start the patient on 

acyclovir 700mg every 8 hours.  I also gave on time dose of ampilicin 2gm and 

Ceftriaxone 2gm once.  I will defer medication modification to I.D. team.


Patient was on his home dose of aspirin 81 and Plavix 75.  I stopped his Plavix 

for lumbar puncture.  Continue Lipitor 40 mg daily at bedtime.


I started the patient on Keppra 500 mg every 12 hours as a seizure prophylaxis 

but his initial EEG did not show any seizure activity


Patient had a recent TSH is 2.340, hemoglobin A1c is 7.1 with a within the last 

30 days and does not need to be repeated.


I ordered ammonia level, vitamin B-12, folate


On CT head he has possible NPH which is unchanged on this current CT.  Recommend

patient to follow-up with neurologist for large volume tap and if improvement 

consider  shunt.


Every 4 hours neuro checks


We'll defer the rest of the medical management to primary team


The patient condition is very guarded.


For DVT prophylaxis: Use SCD for now (since in process of getting lumbar 

puncture).





The plan is discussed with primary team in length as well his step-daughter 

(Nabila).





Kashif Corado M.D.


Neuro-hospitalist








Time with Patient: Less than 30

## 2022-07-09 NOTE — P.CRDCN
History of Present Illness


History of present illness: 





HISTORY OF PRESENTING ILLNESS


Patient is pleasant 77-year-old male with history of diabetes mellitus, prior 

stroke, hypertension, hyperlipidemia, hypothyroidism, recent non-STEMI which 

appears related to hypoxia as well as new onset cardiomyopathy EF 25-30%.  

Patient was recently seen 6/25 through 7/74 episode of unresponsiveness noted 

that nursing facility.  He was admitted to the hospital and concern of choking 

episode and aspiration and was noted to become more hypoxic as well as acute 

congestive heart failure.  Troponins were noted to be elevated and EF was noted 

to be 25-30% however treated medically.  Patient unable to answer any questions 

which per nursing has been his normal since been admitted to the hospital.  

Patient not opening eyes to any verbal or noxious stimuli currently however 

appears comfortable.  Per chart patient was sent in secondary to altered mental 

status however reportedly normal alert and oriented 2.





Prior hospitalization troponins have gone from 6 up to 13.  Current admission 

troponins flatted 0.14, 0.12, 0.11.  White blood cell 12.3, hemoglobin 12.8, 

sodium 132, creatinine 0.87, glucose 279, albumin 3.4.  EEG performed yesterday 

shows background slowing suggestive of moderate encephalopathy no epileptiform 

activity.  EKG shows normal sinus rhythm, normal axis, nonspecific ST 

depressions in the lateral lead.





REVIEW OF SYSTEMS


At the time of my exam:


Unable to obtain review of systems secondary to altered mental status





PHYSICAL EXAMINATION


Vital signs reviewed.


CONSTITUTIONAL: No apparent distress, appears comfortable, chronically ill-

appearing, not responding to any verbal or noxious stimuli


HEENT: Head is normocephalic. Pupils are equal, round. Sclerae anicteric. Mucous

membranes of the mouth are moist.  No JVD. No carotid bruit.


CHEST EXAMINATION: Lungs are clear to auscultation. No chest wall tenderness is 

noted on palpation or with deep breathing. 


HEART EXAMINATION: Regular rate and rhythm. S1, S2 heard. No murmurs, gallops or

rub.


ABDOMEN: Soft, nontender. Positive bowel sounds.


EXTREMITIES: 2+ peripheral pulses, no lower extremity edema and no calf 

tenderness.


NEUROLOGIC EXAMINATION: Patient is lethargic and not responding to any noxious 

or verbal stimuli





ASSESSMENT


1.  Altered mental status, unclear if truly syncopal episode however appears 

more altered mental status with patient previously a and O 2 per chart


2.  Non-STEMI, suspect still downtrending from prior hospitalization with 

previous troponin up to 13.  No history of any angina-type symptoms.  Previously

not felt to be good interventional candidate at that time


3.  Chronic systolic heart failure


4.  New-onset cardiomyopathy EF 25-30%


5.  Hypertension


6.  History of stroke


7.  Diabetes mellitus type 2





PLAN


Patient with episodes of altered mental status.  Prior hospitalization was 

complicated by likely aspiration event as well as heart failure.  His non-STEMI 

May been related to severe hypoxic episode.  Troponins flatted and likely downtr

ending.  Continue with neurologic workup and no further current workup.  

Continue with heart failure regimen as well as antianginal medications as well 

as to antiplatelets.  Prognosis guarded.





Past Medical History


Past Medical History: CVA/TIA, Diabetes Mellitus, GERD/Reflux, Hyperlipidemia, 

Hypertension, Thyroid Disorder


Additional Past Medical History / Comment(s): States had a stroke left arm and 

leg numbness


History of Any Multi-Drug Resistant Organisms: None Reported


Additional Past Surgical History / Comment(s): cataract surgery


Past Anesthesia/Blood Transfusion Reactions: No Reported Reaction


Smoking Status: Never smoker





- Past Family History


  ** Mother


Family Medical History: Diabetes Mellitus





Medications and Allergies


                                Home Medications











 Medication  Instructions  Recorded  Confirmed  Type


 


Thyroid,Pork [[place] Thyroid] 90 mg PO DAILY 06/27/17 07/08/22 History


 


Aspirin EC [Ecotrin Low Dose] 81 mg PO HS 06/04/21 07/08/22 History


 


Finasteride [Proscar] 5 mg PO DAILY 06/04/21 07/08/22 History


 


Tamsulosin HCl [Flomax] 0.4 mg PO DAILY 06/04/21 07/08/22 History


 


Atorvastatin [Lipitor] 40 mg PO HS@2000 01/18/22 07/08/22 History


 


sitaGLIPtin [Januvia] 100 mg PO DAILY 01/18/22 07/08/22 History


 


Docusate [Colace] 100 mg PO HS 06/25/22 07/08/22 History


 


Multivitamins, Thera [Multivitamin 1 tab PO DAILY 06/25/22 07/08/22 History





(formulary)]    


 


Sennosides [Senokot] 8.6 mg PO DAILY 06/25/22 07/08/22 History


 


Clopidogrel [Plavix] 75 mg PO DAILY  tab 07/01/22 07/08/22 Rx


 


Isosorbide Mononitrate ER [Imdur] 30 mg PO DAILY  tab 07/01/22 07/08/22 Rx


 


Losartan [Cozaar] 25 mg PO HS  tab 07/01/22 07/08/22 Rx


 


Metoprolol Tartrate [Lopressor] 25 mg PO BID  tab 07/01/22 07/08/22 Rx


 


Furosemide [Lasix] 20 mg PO DAILY #30 tab 07/07/22 07/08/22 Rx


 


traMADol HCL 50 mg PO Q6H PRN #30 tab 07/07/22 07/08/22 Rx


 


Ipratropium-Albuterol Nebulize 3 ml INHALATION RT-Q4H PRN 07/08/22 07/08/22 

History





[Duoneb 0.5 mg-3 mg/3 ml Soln]    


 


Spironolactone [Aldactone] 12.5 mg PO TID 07/08/22 07/08/22 History








                                    Allergies











Allergy/AdvReac Type Severity Reaction Status Date / Time


 


No Known Allergies Allergy   Verified 07/08/22 10:37














Physical Exam


Vitals: 


                                   Vital Signs











  Temp Pulse Pulse Resp BP BP Pulse Ox


 


 07/09/22 04:40  98 F   92  17   165/74  97


 


 07/08/22 23:35  98.4 F   98  18   157/81  98


 


 07/08/22 19:45  99.9 F H   106 H  17   167/93  100


 


 07/08/22 19:08     18   


 


 07/08/22 18:52       150/89 


 


 07/08/22 18:14   92     


 


 07/08/22 18:03   94      89 L


 


 07/08/22 17:30  98.9 F   89  18   185/109  92 L


 


 07/08/22 16:01   75   18  181/94   99


 


 07/08/22 14:24   79   20    94 L


 


 07/08/22 13:00   80   22  140/81   96


 


 07/08/22 12:55  98.2 F  81   20    97


 


 07/08/22 11:40   98   16  121/60   98


 


 07/08/22 09:57  98.1 F  72   20  157/83   97














  FiO2


 


 07/09/22 04:40 


 


 07/08/22 23:35 


 


 07/08/22 19:45 


 


 07/08/22 19:08 


 


 07/08/22 18:52 


 


 07/08/22 18:14 


 


 07/08/22 18:03  21


 


 07/08/22 17:30 


 


 07/08/22 16:01 


 


 07/08/22 14:24 


 


 07/08/22 13:00 


 


 07/08/22 12:55 


 


 07/08/22 11:40 


 


 07/08/22 09:57 








                                Intake and Output











 07/08/22 07/08/22 07/09/22





 14:59 22:59 06:59


 


Output Total   1600


 


Balance   -1600


 


Output:   


 


  Urine   1600


 


Other:   


 


  Voiding Method  Indwelling Catheter Indwelling Catheter


 


  Weight 72.575 kg 72.575 kg 














Results





                                 07/08/22 10:10





                                 07/08/22 10:10


                                 Cardiac Enzymes











  07/08/22 07/08/22 07/08/22 Range/Units





  10:10 10:10 14:48 


 


AST  23    (17-59)  U/L


 


Troponin I   0.148 H*  0.127 H*  (0.000-0.034)  ng/mL














  07/08/22 Range/Units





  17:49 


 


AST   (17-59)  U/L


 


Troponin I  0.114 H*  (0.000-0.034)  ng/mL








                                   Coagulation











  07/08/22 Range/Units





  10:10 


 


PT  10.6  (9.0-12.0)  sec


 


APTT  22.6  (22.0-30.0)  sec








                                       CBC











  07/08/22 Range/Units





  10:10 


 


WBC  12.3 H  (3.8-10.6)  k/uL


 


RBC  4.05 L  (4.30-5.90)  m/uL


 


Hgb  12.8 L  (13.0-17.5)  gm/dL


 


Hct  37.6 L  (39.0-53.0)  %


 


Plt Count  276  (150-450)  k/uL








                          Comprehensive Metabolic Panel











  07/08/22 Range/Units





  10:10 


 


Sodium  132 L  (137-145)  mmol/L


 


Potassium  4.5  (3.5-5.1)  mmol/L


 


Chloride  100  ()  mmol/L


 


Carbon Dioxide  26  (22-30)  mmol/L


 


BUN  15  (9-20)  mg/dL


 


Creatinine  0.87  (0.66-1.25)  mg/dL


 


Glucose  279 H  (74-99)  mg/dL


 


Calcium  9.1  (8.4-10.2)  mg/dL


 


AST  23  (17-59)  U/L


 


ALT  27  (4-49)  U/L


 


Alkaline Phosphatase  97  ()  U/L


 


Total Protein  6.2 L  (6.3-8.2)  g/dL


 


Albumin  3.4 L  (3.5-5.0)  g/dL








                               Current Medications











Generic Name Dose Route Start Last Admin





  Trade Name Freq  PRN Reason Stop Dose Admin


 


Albuterol/Ipratropium  3 ml  07/08/22 15:59  07/08/22 18:03





  Ipratropium-Albuterol 3 Ml Neb  INHALATION   3 ml





  RT-Q4H PRN   Administration





  Shortness Of Breath  


 


Aspirin  81 mg  07/08/22 21:00  07/08/22 21:39





  Aspirin 81 Mg  PO   Not Given





  HS [facility]  


 


Atorvastatin Calcium  40 mg  07/08/22 20:00  07/08/22 21:39





  Atorvastatin 40 Mg Tab  PO   Not Given





  HS@2000 [facility]  


 


Clopidogrel Bisulfate  75 mg  07/09/22 09:00 





  Clopidogrel 75 Mg Tab  PO  





  DAILY [facility]  


 


Finasteride  5 mg  07/09/22 09:00 





  Finasteride 5 Mg Tab  PO  





  DAILY [facility]  


 


Furosemide  20 mg  07/09/22 09:00 





  Furosemide 20 Mg Tab  PO  





  DAILY [facility]  


 


Hydralazine HCl  5 mg  07/08/22 17:42 





  Hydralazine Hcl 20 Mg/Ml 1 Ml Vial  IVP  





  Q4HR PRN  





  Blood Pressure - High  


 


Isosorbide Mononitrate  30 mg  07/09/22 09:00 





  Isosorbide Mononitrate Er 30 Mg Tab.Er.24h  PO  





  DAILY [facility]  


 


Linagliptin  5 mg  07/09/22 09:00 





  Linagliptin 5 Mg Tablet  PO  





  DAILY [facility]  


 


Losartan Potassium  25 mg  07/08/22 21:00  07/08/22 21:40





  Losartan 25 Mg Tab  PO   Not Given





  HS [facility]  


 


Melatonin  3 mg  07/08/22 15:58 





  Melatonin 3 Mg Tablet  PO  





  HS PRN  





  Insomnia  


 


Metoprolol Tartrate  25 mg  07/08/22 21:00  07/08/22 21:40





  Metoprolol Tartrate 25 Mg Tab  PO   Not Given





  BID [facility]  


 


Multivitamins  1 each  07/09/22 09:00 





  Multivitamins, Thera 1 Each Tab  PO  





  DAILY [facility]  


 


Naloxone HCl  0.2 mg  07/08/22 12:52 





  Naloxone 0.4 Mg/Ml 1 Ml Vial  IV  





  Q2M PRN  





  Opioid Reversal  


 


Ondansetron HCl  4 mg  07/08/22 15:58 





  Ondansetron 4 Mg/2 Ml Vial  IVP  





  Q8HR PRN  





  Nausea And Vomiting  


 


Pantoprazole Sodium  40 mg  07/09/22 09:00 





  Pantoprazole 40 Mg/10 Ml Vial  IV  





  DAILY [facility]  


 


Tamsulosin HCl  0.4 mg  07/09/22 09:00 





  Tamsulosin 0.4 Mg Cap.Er.24h  PO  





  DAILY [facility]  


 


Thyroid  90 mg  07/09/22 09:00 





  Thyroid, Pork 30 Mg Tab  PO  





  DAILY [facility]  


 


Tramadol HCl  50 mg  07/08/22 15:59 





  Tramadol 50 Mg Tab  PO  





  Q6H PRN  





  Pain  








                                Intake and Output











 07/08/22 07/08/22 07/09/22





 14:59 22:59 06:59


 


Output Total   1600


 


Balance   -1600


 


Output:   


 


  Urine   1600


 


Other:   


 


  Voiding Method  Indwelling Catheter Indwelling Catheter


 


  Weight 72.575 kg 72.575 kg 








                                 Patient Weight











 07/09/22





 06:59


 


Weight 72.575 kg








                                        





                                 07/08/22 10:10 





                                 07/08/22 10:10

## 2022-07-09 NOTE — XR
EXAMINATION TYPE: XR chest 1V

 

DATE OF EXAM: 7/9/2022

 

CLINICAL HISTORY: Difficulty breathing progress study.  

 

TECHNIQUE: Single AP portable upright view of the chest is obtained.

 

COMPARISON: Chest x-ray from one day earlier and older studies

 

FINDINGS:  Stable cardiomegaly with atherosclerotic and ectatic thoracic aorta causing slight right-s
ided tracheal deviation. Chronic parenchymal changes bilaterally without suspicious new focal airspac
e opacity, pleural effusion, or pneumothorax seen. Osseous structures are intact.

 

IMPRESSION: Chronic changes and cardiomegaly without acute pulmonary process.

## 2022-07-09 NOTE — P.PN
Subjective


Progress Note Date: 07/09/22





Hospital course:


77 years old male patient with a history of previous CVA/TIA hypertension 

hyperlipidemia diabetes coronary artery disease congestive heart failure EF of 

20-25% thyroid disorder gastroesophageal reflux disease who was recently here 

for aspiration pneumonia and respiratory failure was treated with antibiotics 

requiring ICU admission, speech prescribed him nectar thick diet and was 

discharged with a Kennedy because of urinary retention presented again to the 

hospital with the chief complaints of choking and altered mental status at 

nursing facility.  Patient had computed tomography scan was negative for any 

acute finding except chronic finding of mild hydrocephalus with a possibility of

a component of NPH and unchanged encephalomalacia as a result of prior vascular 

traumatic insult.  Patient chest x-ray showed possible CHF with mild 

interstitial edema.  Patient was admitted for syncopal episode and 

encephalopathy.





Subjective:


Patient seen at bedside, he remains confused, not responding to my questions not

participating in conversation.  Nursing staff reported the patient is in the 

similar clinical condition since yesterday.  Extract baseline unclear.  He is 

not having any acute distress.  Case discussed with neurology





Physical exam


General: Patient is confused and nonverbal exam grossly nonfocal


Head: atraumatic, normocephalic, symmetric


Eyes: no lid lesion], anicteric sclera


Mouth: no lip lesion, mucus membranes moist


Cardiovascular: S1S2 reg rate and rhythm, no murmur, no gallop


Lungs: Bilateral equal air entry, no wheezing no rhonchi no crackles.


Abdominal: soft,  nontender to palpation, no guarding, no appreciable 

organomegaly


Ext: no gross muscle atrophy, no edema extremities warm to suppose a positive


Neuro: Alert oriented x 0, patient exam grossly nonfocal but full examination 

cannot be done due to clinical condition





Assessment and plan





Acute metabolic encephalopathy


Unclear etiology, unclear baseline, differential would, stroke seizure involving

infection


Computed tomography scan negative for any acute finding


TSH normal


Infectious workup urinalysis chest x-ray or blood culture negative


Patient had EEG, EEG negative 


Checking B12 folate ammonia levels


We'll order MRI brain


We'll discuss with neurology





Syncopal episode


Patient for medical record had a syncopal episode at nursing home facility 

possibly after choking on something and then he became encephalopathy


Patient has new onset cardiomyopathy 25-30%, downtrending troponin cannot 

exclude cardiac etiology for patient's syncopal episode


Patient seen by cardiology and at this point they're recommending continuation 

of goal-directed medical therapy 


Overall prognosis poor, cardiology is not recommending any further workup





Dysphagia


Nothing by mouth for now, we'll do gentle hydration with D5 normal saline watch 

for fluid overload and fluid and electrolyte


We will request speech therapy for evaluation





Chronic systolic cardiomyopathy recently diagnosed


EF of 25-30%


Continue goal-directed medical therapy


Strict ins and outs, holding diuretic at this point will do spot diuresis as 

needed


As mentioned above no further cardiac workup or intervention as per cardiology





Diabetes mellitus


Patient not eating and drinking anything at this point will hold diabetic 

medications


Continue Accu-Cheks





Urinary retention


History of BPH, maintain Kennedy


Continue Flomax





Chronic stable medical condition


Hypertension


Operative


Hypothyroidism





CODE STATUS: DO NOT RESUSCITATE


DVT prophylaxis: Subcutaneous heparin


Discharge planning: To be determined, pending clinical improvement.  We'll 

consult palliative for hospice eval and family discussion to determine goals of 

care








Objective





- Vital Signs


Vital signs: 


                                   Vital Signs











Temp  100.6 F H  07/09/22 08:00


 


Pulse  88   07/09/22 08:34


 


Resp  16   07/09/22 08:00


 


BP  143/65   07/09/22 08:00


 


Pulse Ox  96   07/09/22 08:00


 


FiO2  21   07/08/22 18:03








                                 Intake & Output











 07/08/22 07/09/22 07/09/22





 18:59 06:59 18:59


 


Output Total  1600 


 


Balance  -1600 


 


Weight 72.575 kg  


 


Output:   


 


  Urine  1600 


 


Other:   


 


  Voiding Method  Indwelling Catheter Indwelling Catheter














- Labs


CBC & Chem 7: 


                                 07/09/22 07:05





                                 07/09/22 07:05


Labs: 


                  Abnormal Lab Results - Last 24 Hours (Table)











  07/08/22 07/08/22 07/09/22 Range/Units





  14:48 17:49 07:05 


 


WBC    16.4 H  (3.8-10.6)  k/uL


 


RBC    4.26 L  (4.30-5.90)  m/uL


 


Neutrophils #    14.0 H  (1.3-7.7)  k/uL


 


Sodium     (137-145)  mmol/L


 


Chloride     ()  mmol/L


 


Glucose     (74-99)  mg/dL


 


Troponin I  0.127 H*  0.114 H*   (0.000-0.034)  ng/mL














  07/09/22 Range/Units





  07:05 


 


WBC   (3.8-10.6)  k/uL


 


RBC   (4.30-5.90)  m/uL


 


Neutrophils #   (1.3-7.7)  k/uL


 


Sodium  132 L  (137-145)  mmol/L


 


Chloride  96 L  ()  mmol/L


 


Glucose  292 H  (74-99)  mg/dL


 


Troponin I   (0.000-0.034)  ng/mL

## 2022-07-09 NOTE — XR
EXAMINATION TYPE: XR chest 1V portable

 

DATE OF EXAM: 7/9/2022

 

COMPARISON: Today

 

HISTORY: Short of breath

 

TECHNIQUE:

 

FINDINGS: Heart is slightly enlarged. There is minimal pulmonary congestion. There are chest leads. L
ungs are clear of consolidation. Thoracic aorta is atheromatous.

 

IMPRESSION: Cardiomegaly and mild pulmonary congestion without change compared to exam this morning.

## 2022-07-10 LAB
ANION GAP SERPL CALC-SCNC: 17 MMOL/L
BASOPHILS # BLD AUTO: 0 K/UL (ref 0–0.2)
BASOPHILS NFR BLD AUTO: 0 %
BUN SERPL-SCNC: 22 MG/DL (ref 9–20)
CALCIUM SPEC-MCNC: 9.1 MG/DL (ref 8.4–10.2)
CHLORIDE SERPL-SCNC: 104 MMOL/L (ref 98–107)
CO2 SERPL-SCNC: 16 MMOL/L (ref 22–30)
EOSINOPHIL # BLD AUTO: 0 K/UL (ref 0–0.7)
EOSINOPHIL NFR BLD AUTO: 0 %
ERYTHROCYTE [DISTWIDTH] IN BLOOD BY AUTOMATED COUNT: 4.3 M/UL (ref 4.3–5.9)
ERYTHROCYTE [DISTWIDTH] IN BLOOD: 14.3 % (ref 11.5–15.5)
GLUCOSE BLD-MCNC: 296 MG/DL (ref 70–110)
GLUCOSE BLD-MCNC: 334 MG/DL (ref 70–110)
GLUCOSE BLD-MCNC: 352 MG/DL (ref 70–110)
GLUCOSE SERPL-MCNC: 325 MG/DL (ref 74–99)
HCT VFR BLD AUTO: 40.9 % (ref 39–53)
HGB BLD-MCNC: 13.6 GM/DL (ref 13–17.5)
LYMPHOCYTES # SPEC AUTO: 1.2 K/UL (ref 1–4.8)
LYMPHOCYTES NFR SPEC AUTO: 7 %
MCH RBC QN AUTO: 31.5 PG (ref 25–35)
MCHC RBC AUTO-ENTMCNC: 33.1 G/DL (ref 31–37)
MCV RBC AUTO: 95.1 FL (ref 80–100)
MONOCYTES # BLD AUTO: 0.9 K/UL (ref 0–1)
MONOCYTES NFR BLD AUTO: 5 %
NEUTROPHILS # BLD AUTO: 14.8 K/UL (ref 1.3–7.7)
NEUTROPHILS NFR BLD AUTO: 87 %
PLATELET # BLD AUTO: 301 K/UL (ref 150–450)
POTASSIUM SERPL-SCNC: 4.5 MMOL/L (ref 3.5–5.1)
SODIUM SERPL-SCNC: 137 MMOL/L (ref 137–145)
UUN UR-MCNC: 676 MG/DL
WBC # BLD AUTO: 17.1 K/UL (ref 3.8–10.6)

## 2022-07-10 RX ADMIN — CEFAZOLIN SCH: 330 INJECTION, POWDER, FOR SOLUTION INTRAMUSCULAR; INTRAVENOUS at 09:09

## 2022-07-10 RX ADMIN — ISOSORBIDE MONONITRATE SCH: 30 TABLET, EXTENDED RELEASE ORAL at 07:21

## 2022-07-10 RX ADMIN — THYROID, PORCINE SCH: 30 TABLET ORAL at 09:11

## 2022-07-10 RX ADMIN — SODIUM CHLORIDE SCH MLS/HR: 900 INJECTION, SOLUTION INTRAVENOUS at 10:17

## 2022-07-10 RX ADMIN — PANTOPRAZOLE SODIUM SCH MG: 40 INJECTION, POWDER, FOR SOLUTION INTRAVENOUS at 09:10

## 2022-07-10 RX ADMIN — MORPHINE SULFATE SCH MLS/HR: 50 INJECTION, SOLUTION, CONCENTRATE INTRAVENOUS at 15:48

## 2022-07-10 RX ADMIN — THERA TABS SCH: TAB at 07:22

## 2022-07-10 RX ADMIN — IPRATROPIUM BROMIDE AND ALBUTEROL SULFATE PRN ML: .5; 3 SOLUTION RESPIRATORY (INHALATION) at 08:28

## 2022-07-10 RX ADMIN — METOPROLOL TARTRATE SCH: 25 TABLET, FILM COATED ORAL at 07:22

## 2022-07-10 RX ADMIN — TAMSULOSIN HYDROCHLORIDE SCH: 0.4 CAPSULE ORAL at 07:22

## 2022-07-10 RX ADMIN — SODIUM CHLORIDE SCH MLS/HR: 900 INJECTION, SOLUTION INTRAVENOUS at 00:04

## 2022-07-10 RX ADMIN — ASPIRIN SCH MG: 300 SUPPOSITORY RECTAL at 09:10

## 2022-07-10 RX ADMIN — LEVETIRACETAM SCH MLS/HR: 100 INJECTION, SOLUTION, CONCENTRATE INTRAVENOUS at 09:10

## 2022-07-10 RX ADMIN — FINASTERIDE SCH: 5 TABLET, FILM COATED ORAL at 07:21

## 2022-07-10 NOTE — P.PN
Subjective


Progress Note Date: 07/10/22





Hospital course:


This is a 77-year-old white male with history of multiple medical problems, 

nonverbal, patient was admitted on 7/8/22, admitted mostly with mental status 

change.  Apparently he was seen in the ER with episodes of unresponsiveness, and

was recently in the hospital for possible aspiration pneumonia.  CT of the brain

on admission showed mild hydrocephalus, and the patient was seen by neurology on

consultation on 7/8/2022.  The neurologist raised the possibility of syncopal 

episode, rule out seizure, and he also raised the possibility of CVA/left 

frontal/old.  Since admission the patient was seen by many consultants including

internal medicine, neurology, nephrology, and cardiology.  Today, the patient 

had a repeat CT of the brain, and it showed large left-sided hemorrhagic acute 

infarct with local mass effect and midline shift this is definitely new compared

to the CT of the brain which he had on his admission. 





Patient computed tomography scan reviewed with neurology and management plan 

discussed in detail.  Patient was noted to have acute intracranial bleed with 

suspected ischemic stroke with hemorrhagic conversion.  Patient was not 

receiving any anticoagulation in the hospital did not receive any falls or any 

trauma to head.  Patient was not on any antiplatelet agents orally since he was 

nothing by mouth he did receive 1 rectal aspirin yesterday per nursing staff.  

Patient was started on blood pressure lowering treatment, mannitol, IV Keppra f

or seizure prophylaxis, he was also given desmopressin, patient daughter was 

contacted and also [facility] stroke team was contacted for possible transfe

if family agrees for aggressive intervention.  Patient was then transferred to 

ICU





Multiple attempts were made to contact patient daughter in the morning by 

neurology nursing staff and by myself apparently she was in the Religion and she 

did not receive any phone calls but later on she heard a voice messages and call

me back at 12:19 PM case was discussed in detail with her and I updated her with

recent developments, at this point she does not want to proceed with any 

aggressive interventions and would like to proceed with hospice and comfort 

measures only.Palliative care consulted





Patient daughter yoshi Loyola phone number is [phone]





Subjective:


Patient seen at bedside, he remains confused, not responding to my questions not

participating in conversation.  


Patient was extensively discussed with neurology nursing staff at bedside


Later on I met with daughter at bedside as well


Patient daughter confirms and wants to proceed with comfort measures and hospice

at this time





Physical exam


General: Patient is confused and nonverbal exam grossly nonfocal


Head: atraumatic, normocephalic, symmetric


Eyes: no lid lesion], anicteric sclera


Mouth: no lip lesion, mucus membranes moist


Cardiovascular: S1S2 reg rate and rhythm, no murmur, no gallop


Lungs: Bilateral equal air entry, no wheezing no rhonchi no crackles.


Abdominal: soft,  nontender to palpation, no guarding, no appreciable 

organomegaly


Ext: no gross muscle atrophy, no edema extremities warm to suppose a positive


Neuro: Alert oriented x 0, patient exam grossly nonfocal but full examination 

cannot be done due to clinical condition





Assessment and plan





Acute intracranial hemorrhage, suspect hemorrhagic conversion of ischemic stroke


Computed tomography scan that was done on 07/08/2022 was negative for any acute 

findings but computed tomography scan done today on 07/10/2022 showed acute 

intracranial hemorrhage


Extensive discussion with neurology and family was done


Family wants to proceed with comfort measures only at this time


Patient daughter daughter Nir phone number is [phone]





Syncopal episode


Comfort measures As above 





Dysphagia


Comfort measures As above 








Chronic systolic cardiomyopathy recently diagnosed


EF of 25-30%


Comfort measures As above 








Diabetes mellitus


Comfort measures As above 








Urinary retention


Comfort measures As above 








Chronic stable medical condition


Hypertension


Operative


Hypothyroidism





CODE STATUS: Comfort measures only


DVT prophylaxis: None


Discharge planning: Comfort measures As above , palliative care  

consulted for discharge planning








Objective





- Vital Signs


Vital signs: 


                                   Vital Signs











Temp  99.5 F   07/10/22 12:00


 


Pulse  85   07/10/22 17:00


 


Resp  26 H  07/10/22 17:00


 


BP  136/84   07/10/22 16:00


 


Pulse Ox  97   07/10/22 17:00


 


FiO2  21   07/08/22 18:03








                                 Intake & Output











 07/09/22 07/10/22 07/10/22





 18:59 06:59 18:59


 


Intake Total 0  160


 


Output Total 1350 650 510


 


Balance -1350 -650 -350


 


Weight  88.5 kg 


 


Intake:   


 


  Intake, IV Titration   160





  Amount   


 


    Desmopressin Acetate 23   50





    mcg In Sodium Chloride 0.   





    9% 50 ml @ 200 mls/hr   





    IVPB ONCE ONE Rx#:   





    [id number]   


 


    Saline 1 500ml.bag @ 220   110





    mls/hr IV .Q30M ONE with   





    Mannitol 20% Pmx 110 ml   





    Rx#:[id number]   


 


  Oral 0  0


 


Output:   


 


  Urine 1350 650 510


 


Other:   


 


  Voiding Method Indwelling Catheter Indwelling Catheter Indwelling Catheter














- Labs


CBC & Chem 7: 


                                 07/10/22 07:30





                                 07/10/22 07:30


Labs: 


                  Abnormal Lab Results - Last 24 Hours (Table)











  07/09/22 07/09/22 07/10/22 Range/Units





  17:31 17:31 01:30 


 


WBC     (3.8-10.6)  k/uL


 


Neutrophils #     (1.3-7.7)  k/uL


 


Carbon Dioxide     (22-30)  mmol/L


 


BUN     (9-20)  mg/dL


 


Glucose     (74-99)  mg/dL


 


POC Glucose (mg/dL)    296 H  ()  mg/dL


 


Urine Protein  Trace H    (Negative)  


 


Urine Glucose (UA)  4+ H    (Negative)  


 


Urine Ketones  4+ H    (Negative)  


 


Urine Blood  Trace H    (Negative)  


 


Ur Random Uric Acid   31.6 L   (37.0-92.0)  mg/dL














  07/10/22 07/10/22 07/10/22 Range/Units





  06:40 07:30 07:30 


 


WBC   17.1 H   (3.8-10.6)  k/uL


 


Neutrophils #   14.8 H   (1.3-7.7)  k/uL


 


Carbon Dioxide    16 L  (22-30)  mmol/L


 


BUN    22 H  (9-20)  mg/dL


 


Glucose    325 H  (74-99)  mg/dL


 


POC Glucose (mg/dL)  334 H    ()  mg/dL


 


Urine Protein     (Negative)  


 


Urine Glucose (UA)     (Negative)  


 


Urine Ketones     (Negative)  


 


Urine Blood     (Negative)  


 


Ur Random Uric Acid     (37.0-92.0)  mg/dL














  07/10/22 07/10/22 Range/Units





  11:49 11:59 


 


WBC    (3.8-10.6)  k/uL


 


Neutrophils #    (1.3-7.7)  k/uL


 


Carbon Dioxide    (22-30)  mmol/L


 


BUN    (9-20)  mg/dL


 


Glucose    (74-99)  mg/dL


 


POC Glucose (mg/dL)  364 H  352 H  ()  mg/dL


 


Urine Protein    (Negative)  


 


Urine Glucose (UA)    (Negative)  


 


Urine Ketones    (Negative)  


 


Urine Blood    (Negative)  


 


Ur Random Uric Acid    (37.0-92.0)  mg/dL

## 2022-07-10 NOTE — P.CNPUL
History of Present Illness


Consult date: 07/10/22


Requesting physician: Kashif Corado


Reason for consult: other (Intercerebral hemorrhage, ICU transfer)


Chief complaint: Altered mental status


History of present illness: 





This is a 77-year-old white male with history of multiple medical problems, 

nonverbal, patient was admitted on 7/8/22, admitted mostly with mental status 

change.  Apparently he was seen in the ER with episodes of unresponsiveness, and

was recently in the hospital for possible aspiration pneumonia.  CT of the brain

on admission showed mild hydrocephalus, and the patient was seen by neurology on

consultation on 7/8/2022.  The neurologist raised the possibility of syncopal 

episode, rule out seizure, and he also raised the possibility of CVA/left 

frontal/old.  Since admission the patient was seen by many consultants including

internal medicine, neurology, nephrology, and cardiology.  Today, the patient 

had a repeat CT of the brain, and it showed large left-sided hemorrhagic acute 

infarct with local mass effect and midline shift this is definitely new compared

to the CT of the brain which he had on his admission.  Considering this, I was 

notified about this patient by the neurologist, and wanted the patient be 

transferred to the ICU.  In the meantime the patient is DO NOT RESUSCITATE, and 

apparently the admitting physician and has been discussing this possibility of 

hospice and comfort care measures with the daughter.  I did accept the patient 

to transfer to the ICU, however shortly after he was transferred, the admitting 

physician documented in the chart that he was able to reach the daughter and she

basically wanted no aggressive intervention and she definitely would like to 

proceed with hospice and comfort care measures only.





Review of Systems


ROS unobtainable: due to mental status





Past Medical History


Past Medical History: CVA/TIA, Diabetes Mellitus, GERD/Reflux, Hyperlipidemia, 

Hypertension, Thyroid Disorder


Additional Past Medical History / Comment(s): States had a stroke left arm and 

leg numbness


History of Any Multi-Drug Resistant Organisms: None Reported


Additional Past Surgical History / Comment(s): cataract surgery


Past Anesthesia/Blood Transfusion Reactions: No Reported Reaction


Smoking Status: Never smoker





- Past Family History


  ** Mother


Family Medical History: Diabetes Mellitus





Medications and Allergies


                                Home Medications











 Medication  Instructions  Recorded  Confirmed  Type


 


Thyroid,Pork [Sherman Thyroid] 90 mg PO DAILY 06/27/17 07/08/22 History


 


Aspirin EC [Ecotrin Low Dose] 81 mg PO HS 06/04/21 07/08/22 History


 


Finasteride [Proscar] 5 mg PO DAILY 06/04/21 07/08/22 History


 


Tamsulosin HCl [Flomax] 0.4 mg PO DAILY 06/04/21 07/08/22 History


 


Atorvastatin [Lipitor] 40 mg PO HS@2000 01/18/22 07/08/22 History


 


sitaGLIPtin [Januvia] 100 mg PO DAILY 01/18/22 07/08/22 History


 


Docusate [Colace] 100 mg PO HS 06/25/22 07/08/22 History


 


Multivitamins, Thera [Multivitamin 1 tab PO DAILY 06/25/22 07/08/22 History





(formulary)]    


 


Sennosides [Senokot] 8.6 mg PO DAILY 06/25/22 07/08/22 History


 


Clopidogrel [Plavix] 75 mg PO DAILY  tab 07/01/22 07/08/22 Rx


 


Isosorbide Mononitrate ER [Imdur] 30 mg PO DAILY  tab 07/01/22 07/08/22 Rx


 


Losartan [Cozaar] 25 mg PO HS  tab 07/01/22 07/08/22 Rx


 


Metoprolol Tartrate [Lopressor] 25 mg PO BID  tab 07/01/22 07/08/22 Rx


 


Furosemide [Lasix] 20 mg PO DAILY #30 tab 07/07/22 07/08/22 Rx


 


traMADol HCL 50 mg PO Q6H PRN #30 tab 07/07/22 07/08/22 Rx


 


Ipratropium-Albuterol Nebulize 3 ml INHALATION RT-Q4H PRN 07/08/22 07/08/22 H

istory





[Duoneb 0.5 mg-3 mg/3 ml Soln]    


 


Spironolactone [Aldactone] 12.5 mg PO TID 07/08/22 07/08/22 History








                                    Allergies











Allergy/AdvReac Type Severity Reaction Status Date / Time


 


No Known Allergies Allergy   Verified 07/08/22 10:37














Physical Exam


Vitals: 


                                   Vital Signs











  Temp Pulse Pulse Resp BP Pulse Ox


 


 07/10/22 11:23  99.5 F   104 H  20  133/77  97


 


 07/10/22 09:24       96


 


 07/10/22 09:05  99.6 F   103 H  22  149/90  98


 


 07/10/22 08:39   86    


 


 07/10/22 08:30   92    


 


 07/10/22 07:50    103 H   


 


 07/10/22 04:35  98 F   98  17  149/85  99


 


 07/10/22 00:10  98.3 F   99  17  144/81  99


 


 07/09/22 20:12   78    


 


 07/09/22 20:02   111 H    


 


 07/09/22 19:50  99.5 F   86  18  155/80  98


 


 07/09/22 16:00  99.7 F H   87  18  158/60  98


 


 07/09/22 13:25    76  16  152/74  100


 


 07/09/22 13:10  97.7 F   81  18  147/63  98








                                Intake and Output











 07/09/22 07/10/22 07/10/22





 22:59 06:59 14:59


 


Intake Total 0  50


 


Output Total 150 650 


 


Balance -150 -650 50


 


Intake:   


 


  Intake, IV Titration   50





  Amount   


 


    Desmopressin Acetate 23   50





    mcg In Sodium Chloride 0.   





    9% 50 ml @ 200 mls/hr   





    IVPB ONCE ONE Rx#:   





    [id number]   


 


  Oral 0  0


 


Output:   


 


  Urine 150 650 


 


Other:   


 


  Voiding Method Indwelling Catheter Indwelling Catheter Indwelling Catheter


 


  Weight  88.5 kg 














Physical Exam: Revealed a 77-year-old white male, unresponsive, briefly opens 

eyes,


HEENT:[Neck is supple.] [No neck masses.] [No thyromegaly.] [No JVD.]  Right 

lower facial droop is noted


Chest: [Clear throughout, no crackles, no rhonchi, no wheezes.]


Cardiac Exam: [Normal S1 and S2, no S3 gallop, no murmur.]


Abdomen: [Soft, nontender,  no megaly, no rebound, no guarding, normal bowel 

sounds.]


Extremities: [No clubbing, no edema, no cyanosis.]


Neurological Exam: [Patient is unresponsive to any stimuli, opens eyes briefly, 

pupils are round equal and reactive to light, has right lower facial droop,


Psychiatric: Could not assess.


Skin: No rashes.





Results





- Laboratory Findings


CBC and BMP: 


                                 07/10/22 07:30





                                 07/10/22 07:30


PT/INR, D-dimer











PT  10.6 sec (9.0-12.0)   07/08/22  10:10    


 


INR  1.0  (<1.2)   07/08/22  10:10    








Abnormal lab findings: 


                                  Abnormal Labs











  07/08/22 07/08/22 07/08/22





  10:10 10:10 10:10


 


WBC  12.3 H  


 


RBC  4.05 L  


 


Hgb  12.8 L  


 


Hct  37.6 L  


 


Neutrophils #  7.8 H  


 


Sodium    132 L


 


Chloride   


 


Carbon Dioxide   


 


BUN   


 


Glucose    279 H


 


POC Glucose (mg/dL)   


 


Troponin I   


 


Total Protein    6.2 L


 


Albumin    3.4 L


 


Vitamin B12   


 


Urine Protein   1+ H 


 


Urine Glucose (UA)   4+ H 


 


Urine Ketones   


 


Urine Blood   


 


Ur Leukocyte Esterase   Small H 


 


Urine RBC   9 H 


 


Urine WBC   9 H 


 


Hyaline Casts   3 H 


 


Urine Mucus   Occasional H 


 


Ur Random Uric Acid   














  07/08/22 07/08/22 07/08/22





  10:10 10:14 14:48


 


WBC   


 


RBC   


 


Hgb   


 


Hct   


 


Neutrophils #   


 


Sodium   


 


Chloride   


 


Carbon Dioxide   


 


BUN   


 


Glucose   


 


POC Glucose (mg/dL)   295 H 


 


Troponin I  0.148 H*   0.127 H*


 


Total Protein   


 


Albumin   


 


Vitamin B12   


 


Urine Protein   


 


Urine Glucose (UA)   


 


Urine Ketones   


 


Urine Blood   


 


Ur Leukocyte Esterase   


 


Urine RBC   


 


Urine WBC   


 


Hyaline Casts   


 


Urine Mucus   


 


Ur Random Uric Acid   














  07/08/22 07/09/22 07/09/22





  17:49 07:05 07:05


 


WBC   16.4 H 


 


RBC   4.26 L 


 


Hgb   


 


Hct   


 


Neutrophils #   14.0 H 


 


Sodium    132 L


 


Chloride    96 L


 


Carbon Dioxide   


 


BUN   


 


Glucose    292 H


 


POC Glucose (mg/dL)   


 


Troponin I  0.114 H*  


 


Total Protein   


 


Albumin   


 


Vitamin B12   


 


Urine Protein   


 


Urine Glucose (UA)   


 


Urine Ketones   


 


Urine Blood   


 


Ur Leukocyte Esterase   


 


Urine RBC   


 


Urine WBC   


 


Hyaline Casts   


 


Urine Mucus   


 


Ur Random Uric Acid   














  07/09/22 07/09/22 07/09/22





  12:29 13:17 17:31


 


WBC   


 


RBC   


 


Hgb   


 


Hct   


 


Neutrophils #   


 


Sodium   


 


Chloride   


 


Carbon Dioxide   


 


BUN   


 


Glucose   


 


POC Glucose (mg/dL)   288 H 


 


Troponin I   


 


Total Protein   


 


Albumin   


 


Vitamin B12  1013.0 H  


 


Urine Protein    Trace H


 


Urine Glucose (UA)    4+ H


 


Urine Ketones    4+ H


 


Urine Blood    Trace H


 


Ur Leukocyte Esterase   


 


Urine RBC   


 


Urine WBC   


 


Hyaline Casts   


 


Urine Mucus   


 


Ur Random Uric Acid   














  07/09/22 07/10/22 07/10/22





  17:31 01:30 06:40


 


WBC   


 


RBC   


 


Hgb   


 


Hct   


 


Neutrophils #   


 


Sodium   


 


Chloride   


 


Carbon Dioxide   


 


BUN   


 


Glucose   


 


POC Glucose (mg/dL)   296 H  334 H


 


Troponin I   


 


Total Protein   


 


Albumin   


 


Vitamin B12   


 


Urine Protein   


 


Urine Glucose (UA)   


 


Urine Ketones   


 


Urine Blood   


 


Ur Leukocyte Esterase   


 


Urine RBC   


 


Urine WBC   


 


Hyaline Casts   


 


Urine Mucus   


 


Ur Random Uric Acid  31.6 L  














  07/10/22 07/10/22 07/10/22





  07:30 07:30 11:49


 


WBC  17.1 H  


 


RBC   


 


Hgb   


 


Hct   


 


Neutrophils #  14.8 H  


 


Sodium   


 


Chloride   


 


Carbon Dioxide   16 L 


 


BUN   22 H 


 


Glucose   325 H 


 


POC Glucose (mg/dL)    364 H


 


Troponin I   


 


Total Protein   


 


Albumin   


 


Vitamin B12   


 


Urine Protein   


 


Urine Glucose (UA)   


 


Urine Ketones   


 


Urine Blood   


 


Ur Leukocyte Esterase   


 


Urine RBC   


 


Urine WBC   


 


Hyaline Casts   


 


Urine Mucus   


 


Ur Random Uric Acid   














  07/10/22





  11:59


 


WBC 


 


RBC 


 


Hgb 


 


Hct 


 


Neutrophils # 


 


Sodium 


 


Chloride 


 


Carbon Dioxide 


 


BUN 


 


Glucose 


 


POC Glucose (mg/dL)  352 H


 


Troponin I 


 


Total Protein 


 


Albumin 


 


Vitamin B12 


 


Urine Protein 


 


Urine Glucose (UA) 


 


Urine Ketones 


 


Urine Blood 


 


Ur Leukocyte Esterase 


 


Urine RBC 


 


Urine WBC 


 


Hyaline Casts 


 


Urine Mucus 


 


Ur Random Uric Acid 














- Diagnostic Findings


Additional studies: 





CT of the brain as noted in HPI





Assessment and Plan


Assessment: 





Impression:


Acute CVA with hemorrhagic conversion


Large left middle cerebral artery territory hemorrhage with significant midline 

shift


Acute encephalopathy secondary to CVA


This is generally edema secondary to intracerebral bleed


History of left frontal CVA


History of aspiration pneumonia


Type 2 diabetes


Hypothyroidism


Dyslipidemia





Recommendation:


Patient was seen and examined


Discussed his condition with the neurologist on the case.


Considering the patient is made comfort care and possibly hospice, I believe the

 patient does not need to be placed on mannitol ordered DDAVP


Strongly recommend comfort care measures as per his daughter's wishes


May transfer the patient back to the ICU to regular medical floor and initiate 

hospice care. 


Time with Patient: Greater than 30

## 2022-07-10 NOTE — P.EN
Patient computed tomography scan reviewed with neurology and management plan 

discussed in detail.  Patient was noted to have acute intracranial bleed with 

suspected ischemic stroke with hemorrhagic conversion.  Patient was not 

receiving any anticoagulation in the hospital did not receive any falls or any 

trauma to head.  Patient was not on any antiplatelet agents orally since he was 

nothing by mouth he did receive 1 rectal aspirin yesterday per nursing staff.  

Patient was started on blood pressure lowering treatment, mannitol, IV Keppra 

for seizure prophylaxis, he was also given desmopressin, patient daughter was 

contacted and also [facility] stroke team was contacted for possible transfe

if family agrees for aggressive intervention.  Patient was then transferred to 

ICU





Multiple attempts were made to contact patient daughter in the morning by 

neurology nursing staff and by myself apparently she was in the [employer] and she 

did not receive any phone calls but later on she heard a voice messages and call

me back at 12:19 PM case was discussed in detail with her and I updated her with

recent developments, at this point she does not want to proceed with any ag

gressive interventions and would like to proceed with hospice and comfort 

measures only.





Patient daughter yoshi Loyola phone number is [phone]





Palliative care consulted

## 2022-07-10 NOTE — P.PN
Subjective


Progress Note Date: 07/10/22


I spoke with the overnight nurse yesterday and she stated unable to obtain 

Lumbar puncture since no consent.  I notified the overnight nurse to give him a 

loading dose of vancomycin once and give him an extra dose of ceftriaxone 2 g 

once.  We could not perform MRI Brain yesterday since could not completed safety

form per nurse.  Today the patient condition is about the same so I ordered 

repeat CT head and it is reported as now visualized large left sided hemorrhagic

acute infarct with local mass effect and midline shift.  There is a 10mm midline

shift. I personally reviewed the CT of the head and I do agree the patient had a

left hemorrhagic.  I feel the patient likely had a large left MCA with hem

orrhagic conversion and I discussed this with the reading radiologist (Dr. Lopez) and feels seems possible.  


Patient did not have any falls in our facility and did not receive any 

anticoagulation.  He received Suppository ASA yesterday.


Because of this read I notified the nurse to send the patient stat to the ICU.  

I spoke with the pharmacy for a 1 dose IV of mannitol as well as DDAVP since the

patient received aspirin.  The patient did not receive and Plavix yesterday 

since she was not taking any by mouth neither the day before.  He was not on any

IV heparin drip or any oral anticoagulation our facility.








Objective





- Vital Signs


Vital signs: 


                                   Vital Signs











Temp  99.6 F   07/10/22 09:05


 


Pulse  103 H  07/10/22 09:05


 


Resp  22   07/10/22 09:05


 


BP  149/90   07/10/22 09:05


 


Pulse Ox  96   07/10/22 09:24


 


FiO2  21   07/08/22 18:03








                                 Intake & Output











 07/09/22 07/10/22 07/10/22





 18:59 06:59 18:59


 


Intake Total 0  


 


Output Total 150 650 


 


Balance -150 -650 


 


Weight  88.5 kg 


 


Intake:   


 


  Oral 0  


 


Output:   


 


  Urine 150 650 


 


Other:   


 


  Voiding Method Indwelling Catheter Indwelling Catheter 














- Exam





GENERAL: The patient is lying in bed and is not in acute distress.





NEUROLOGICAL:


Limited because of his condition.


Higher mental function: The patient is significantly drowsy but briefly opens 

eyes.  Is not verbalizing or following commands. 


Cranial nerves: The pupils are round, equal and reactive to light.  Primary gaze

is midline.  Has mild right lower facial droop.   Otherwise rest are limited.   




Motor: The strength is limited because of his condition/cooperation.  To painful

stimuli withdrawing the left upper and lower more the right.    Normal tone and 

bulk.  No spontaneous movement.


Cerebellum: Unable to assess.


Sensation: Unable to assess light touch.


Plantars are mute  bilaterally.





Some of the workup in our facility during this hospital visit consisted of:


Urinalysis seems questionable for urinary tract infection


Coronavirus seizures nondetected.


CT of the head is reported as similar mild hydrocephalus likely central cerebral

atrophy.  Correlate to exclude a component of normal pressure hydrocephalus.  

Unchanged on encephalomalacia anterior left frontal lobe that could represent 

sequela of prior vascular or traumatic insults.  No acute intracranial 

abnormality seen.  I personally reviewed the CT of the head and I agree with the

report. 


EEG: This is an abnormal routine EEG.  The brachial slowing suggestive of 

moderate encephalopathy of unknown etiology.  There is no focal slowing, e

pileptiform discharges or seizure in the EEG


CT head and it is reported as now visualized large left sided hemorrhagic acute 

infarct with local mass effect and midline shift.  There is a 10mm midline 

shift. I personally reviewed the CT of the head and I do agree the patient had a

left hemorrhagic.  I feel the patient likely had a large left MCA with 

hemorrhagic conversion and I discussed this with the reading radiologist (Dr. Lopez) and feels seems possible.  








- Labs


CBC & Chem 7: 


                                 07/10/22 07:30





                                 07/10/22 07:30


Labs: 


                  Abnormal Lab Results - Last 24 Hours (Table)











  07/09/22 07/09/22 07/09/22 Range/Units





  12:29 13:17 17:31 


 


WBC     (3.8-10.6)  k/uL


 


Neutrophils #     (1.3-7.7)  k/uL


 


Carbon Dioxide     (22-30)  mmol/L


 


BUN     (9-20)  mg/dL


 


Glucose     (74-99)  mg/dL


 


POC Glucose (mg/dL)   288 H   ()  mg/dL


 


Vitamin B12  1013.0 H    (200.0-944.0)  pg/mL


 


Urine Protein    Trace H  (Negative)  


 


Urine Glucose (UA)    4+ H  (Negative)  


 


Urine Ketones    4+ H  (Negative)  


 


Urine Blood    Trace H  (Negative)  


 


Ur Random Uric Acid     (37.0-92.0)  mg/dL














  07/09/22 07/10/22 07/10/22 Range/Units





  17:31 01:30 06:40 


 


WBC     (3.8-10.6)  k/uL


 


Neutrophils #     (1.3-7.7)  k/uL


 


Carbon Dioxide     (22-30)  mmol/L


 


BUN     (9-20)  mg/dL


 


Glucose     (74-99)  mg/dL


 


POC Glucose (mg/dL)   296 H  334 H  ()  mg/dL


 


Vitamin B12     (200.0-944.0)  pg/mL


 


Urine Protein     (Negative)  


 


Urine Glucose (UA)     (Negative)  


 


Urine Ketones     (Negative)  


 


Urine Blood     (Negative)  


 


Ur Random Uric Acid  31.6 L    (37.0-92.0)  mg/dL














  07/10/22 07/10/22 Range/Units





  07:30 07:30 


 


WBC  17.1 H   (3.8-10.6)  k/uL


 


Neutrophils #  14.8 H   (1.3-7.7)  k/uL


 


Carbon Dioxide   16 L  (22-30)  mmol/L


 


BUN   22 H  (9-20)  mg/dL


 


Glucose   325 H  (74-99)  mg/dL


 


POC Glucose (mg/dL)    ()  mg/dL


 


Vitamin B12    (200.0-944.0)  pg/mL


 


Urine Protein    (Negative)  


 


Urine Glucose (UA)    (Negative)  


 


Urine Ketones    (Negative)  


 


Urine Blood    (Negative)  


 


Ur Random Uric Acid    (37.0-92.0)  mg/dL














Assessment and Plan


Assessment: 





Acute stroke.  Likely Left MCA stroke with hemorrhagic conversion.  Was not on 

anticoagulation in our facility (he was on home dose of ASA and Plavix prior to 

arriving to our facility per EMR).


Large left MCA hemorrhage with significant midline shift


Encephalopathy due to above.


Vasogenic edema due to bleed


Likely aspiration pneumonia due to above.


Encephalopathy due to stroke.  Initial EEG is negative for seizures.


History of stroke (left frontal)


Recent hospital visit for aspiration pneumonia


Diabetes


Hypertension


Dyslipidemia


hypothyroidism





Plan: 





I stopped ASA.  I stopped ASA 300mg that was started in our facility yesterday. 

AVOID ANY antiplatelets or anticoagulation.


I spoke with pharmacy team and will give patient one time dose of Mannitol and 

DDAVP.


Continue Keppra 500mg every 12 hours for seizure prophylaxis.  


Recommend SBP goal <140.


Q1 hour neuro checks.


Patient is to be transferred to our ICU team.  I attempted calling the daughter 

but no response.  Patient needs neurosurgical evaluation since we do not have 

one in our facility.


No need to pursue with Lumbar puncture.  I will stop his Acyclovir since this is

not meningoencepahlitis.


I.D. team is on board.


Hospice team is consulted.


Will defer the rest of medical management to the primary team.


For DVT prophylaxis: Use SCD's.





Condition is critical and prognosis appears poor at this time.  Will attempt to 

contact the step-daughter (Nabila) again since keep on receiving voice messages 

and has left her voice messages to call back.





CODE: Is DNR





I had lengthy conversation with patient primary team and nurse.  I also spoke 

with ICU attending regarding this case.





TOTAL of about 40minutes was spent with this case.





UPDATE:


The primary was able to get hold of patient's daughter and she wants to pursue 

with comfort care measurement.








Kashif Corado M.D.


Neuro-hospitalist








Time with Patient: Greater than 30

## 2022-07-10 NOTE — P.NPCON
History of Present Illness





- Reason for Consult


acute renal failure





- History of Present Illness





Reason for consultation: Low urine output





History of present illness:


Patient is a 77-year-old male seen in renal consultation for low urine output.  

Patient's GFR is at baseline with creatinine stable at 0.87 today.  She was 

recently admitted to the hospital with aspiration pneumonia and was discharged 3

days ago.  At that time he was hyponatremic and improved with Samsca.  Patient 

was sent back to the hospital from [facility] due to altered mental status.  Patient is

quite lethargic and does not respond to verbal commands.  Oral intake is poor.  

Blood pressure stable.  Sodium level 137 today.  Blood sugars have been on the 

higher side.  Patient did receive a dose of IV Lasix 2 days ago with improvement

in urine output.  Yesterday he was started on IV fluids with normal saline at 75

mL an hour.  Urine output has improved.  Chest x-ray suggestive of vascular 

congestion.  From June 2022 showed ejection fraction of 25-30%.  He is being 

followed by neurology for altered mental status.  There is concern for 

encephalitis.  MRI and lumbar puncture are pending.  He is on IV acyclovir.





Vital signs are stable.


General: Resting in bed.  Lethargic.


HEENT: Head exam is unremarkable.


LUNGS: Breath sounds decreased.


HEART: Rate and Rhythm are regular.


ABDOMEN: Soft, no distention.


EXTREMITITES: Trace edema.





Past Medical History


Past Medical History: CVA/TIA, Diabetes Mellitus, GERD/Reflux, Hyperlipidemia, 

Hypertension, Thyroid Disorder


Additional Past Medical History / Comment(s): States had a stroke left arm and 

leg numbness


History of Any Multi-Drug Resistant Organisms: None Reported


Additional Past Surgical History / Comment(s): cataract surgery


Past Anesthesia/Blood Transfusion Reactions: No Reported Reaction


Smoking Status: Never smoker





- Past Family History


  ** Mother


Family Medical History: Diabetes Mellitus





Medications and Allergies


                                Home Medications











 Medication  Instructions  Recorded  Confirmed  Type


 


Thyroid,Pork [[place] Thyroid] 90 mg PO DAILY 06/27/17 07/08/22 History


 


Aspirin EC [Ecotrin Low Dose] 81 mg PO HS 06/04/21 07/08/22 History


 


Finasteride [Proscar] 5 mg PO DAILY 06/04/21 07/08/22 History


 


Tamsulosin HCl [Flomax] 0.4 mg PO DAILY 06/04/21 07/08/22 History


 


Atorvastatin [Lipitor] 40 mg PO HS@2000 01/18/22 07/08/22 History


 


sitaGLIPtin [Januvia] 100 mg PO DAILY 01/18/22 07/08/22 History


 


Docusate [Colace] 100 mg PO HS 06/25/22 07/08/22 History


 


Multivitamins, Thera [Multivitamin 1 tab PO DAILY 06/25/22 07/08/22 History





(formulary)]    


 


Sennosides [Senokot] 8.6 mg PO DAILY 06/25/22 07/08/22 History


 


Clopidogrel [Plavix] 75 mg PO DAILY  tab 07/01/22 07/08/22 Rx


 


Isosorbide Mononitrate ER [Imdur] 30 mg PO DAILY  tab 07/01/22 07/08/22 Rx


 


Losartan [Cozaar] 25 mg PO HS  tab 07/01/22 07/08/22 Rx


 


Metoprolol Tartrate [Lopressor] 25 mg PO BID  tab 07/01/22 07/08/22 Rx


 


Furosemide [Lasix] 20 mg PO DAILY #30 tab 07/07/22 07/08/22 Rx


 


traMADol HCL 50 mg PO Q6H PRN #30 tab 07/07/22 07/08/22 Rx


 


Ipratropium-Albuterol Nebulize 3 ml INHALATION RT-Q4H PRN 07/08/22 07/08/22 

History





[Duoneb 0.5 mg-3 mg/3 ml Soln]    


 


Spironolactone [Aldactone] 12.5 mg PO TID 07/08/22 07/08/22 History








                                    Allergies











Allergy/AdvReac Type Severity Reaction Status Date / Time


 


No Known Allergies Allergy   Verified 07/08/22 10:37














Physical Exam


Vitals: 


                                   Vital Signs











  Temp Pulse Pulse Resp BP Pulse Ox


 


 07/10/22 08:39   86    


 


 07/10/22 08:30   92    


 


 07/10/22 04:35  98 F   98  17  149/85  99


 


 07/10/22 00:10  98.3 F   99  17  144/81  99


 


 07/09/22 20:12   78    


 


 07/09/22 20:02   111 H    


 


 07/09/22 19:50  99.5 F   86  18  155/80  98


 


 07/09/22 16:00  99.7 F H   87  18  158/60  98


 


 07/09/22 13:25    76  16  152/74  100


 


 07/09/22 13:10  97.7 F   81  18  147/63  98


 


 07/09/22 12:00  100.5 F H   82  18  154/97  100








                                Intake and Output











 07/09/22 07/10/22 07/10/22





 22:59 06:59 14:59


 


Intake Total 0  


 


Output Total 150 650 


 


Balance -150 -650 


 


Intake:   


 


  Oral 0  


 


Output:   


 


  Urine 150 650 


 


Other:   


 


  Voiding Method Indwelling Catheter Indwelling Catheter 


 


  Weight  88.5 kg 














Results





- Lab Results


                             Most recent lab results











Calcium  9.1 mg/dL (8.4-10.2)   07/10/22  07:30    














                                 07/10/22 07:30





                                 07/10/22 07:30





Assessment and Plan


Plan: 





Assessment:


1.  Acute kidney injury with low urine output improved with IV Lasix.  Currently

 receiving IV fluids.  GFR at baseline.


2.  Hypervolemic hyponatremia.  Also component of hypertonicity from 

hyperglycemia.  Sodium level normal today.


3.  Metabolic acidosis from IV fluids and hyperglycemia.


4.  Encephalopathy.  Neurology following.  MRI and lumbar puncture pending.


5.  Acute on chronic systolic CHF with ejection fraction of 25-30%.


6.  Diabetes mellitus.


7.  Benign hypertension.  Stable.


8.  Dysphagia with recent aspiration pneumonia.


9.  Urinary retention with history of BPH.  Currently has Kennedy catheter.  On 

Flomax.





Plan:


Hep-Lock IV fluids.


Lasix 40 mg IV once today.


2 A of sodium bicarb IV push today.


Maintain losartan for now as blood pressure not low.


Continue to monitor.


Blood sugar control.


Prognosis guarded.





Thank you for the consultation.  I will continue to follow the patient with you 

during his hospital stay.

## 2022-07-10 NOTE — CT
EXAMINATION TYPE: CT brain wo con

 

DATE OF EXAM: 7/10/2022

 

HISTORY: AMS, Right sided weakness

 

CT DLP: 1099.6 mGycm.  Automated Exposure Control for Dose Reduction was Utilized.

 

TECHNIQUE: CT scan of the head is performed without contrast.

 

COMPARISON: CT brain 2 days ago.

 

FINDINGS:   There is now heterogeneous hyperdense material left frontal and temporal regions with gra
y-white matter blurring and midline shift up to 10 mm axial image 30.  

 

Background diffuse cerebral atrophy and chronic small vessel ischemic changes redemonstrated. Cortica
l marycrzu bilaterally redemonstrated. Nasal septal deviation redemonstrated. Paranasal sinuses grossl
y clear.

 

IMPRESSION: Now visualized large left-sided hemorrhagic acute infarct with local mass effect and midl
ine shift.  

 

Ordering neurologist sent perfect serve and text messages at time of dictation.

## 2022-07-11 VITALS — DIASTOLIC BLOOD PRESSURE: 64 MMHG | SYSTOLIC BLOOD PRESSURE: 128 MMHG | HEART RATE: 86 BPM

## 2022-07-11 RX ADMIN — MORPHINE SULFATE SCH MLS/HR: 50 INJECTION, SOLUTION, CONCENTRATE INTRAVENOUS at 21:00

## 2022-07-11 RX ADMIN — LORAZEPAM PRN MG: 2 INJECTION INTRAMUSCULAR; INTRAVENOUS at 21:03

## 2022-07-11 NOTE — P.PN
Subjective





Patient was examined at bedside today.  Case discussed with RN no family members

were present at bedside.  Continues to be CMO/hospice care pending further 

recommendations and discharge planning.





Objective





- Vital Signs


Vital signs: 


                                   Vital Signs











Temp  98.9 F   07/11/22 08:00


 


Pulse  98   07/11/22 10:00


 


Resp  16   07/11/22 10:00


 


BP  129/64   07/11/22 10:00


 


Pulse Ox  96   07/11/22 10:00


 


FiO2  21   07/08/22 18:03








                                 Intake & Output











 07/10/22 07/11/22 07/11/22





 18:59 06:59 18:59


 


Intake Total 203.196 110 106.486


 


Output Total 910 640 160


 


Balance -706.804 -530 -53.514


 


Weight  88 kg 


 


Intake:   


 


  IV 40 110 80


 


    .9 kvo 40 110 80


 


  Intake, IV Titration 163.196  26.486





  Amount   


 


    Desmopressin Acetate 23 50  





    mcg In Sodium Chloride 0.   





    9% 50 ml @ 200 mls/hr   





    IVPB ONCE ONE Rx#:   





    [id number]   


 


    Morphine Sulfate (100 mg/ 3.196  26.486





    2 ml) 100 mg In Sodium   





    Chloride 0.9% 100 ml @ 1   





    MG/HR 1.02 mls/hr IV .   





    Q24H JESSICA Rx#:[id number]   


 


    Saline 1 500ml.bag @ 220 110  





    mls/hr IV .Q30M ONE with   





    Mannitol 20% Pmx 110 ml   





    Rx#:[id number]   


 


  Oral 0  


 


Output:   


 


  Urine 910 640 160


 


Other:   


 


  Voiding Method Indwelling Catheter Indwelling Catheter Indwelling Catheter














- Exam





Examination was limited





Physical exam


General: Patient nonverbal exam grossly nonfocal


Mouth: no lip lesion, mucus membranes moist


Cardiovascular: S1S2 reg rate and rhythm, no murmur, no gallop


Lungs: Bilateral equal air entry


Neuro: Alert oriented x 0, patient exam grossly nonfocal but full examination 

cannot be done due to clinical condition





- Labs


CBC & Chem 7: 


                                 07/10/22 07:30





                                 07/10/22 07:30


Labs: 


                  Abnormal Lab Results - Last 24 Hours (Table)











  07/10/22 07/10/22 Range/Units





  11:49 11:59 


 


POC Glucose (mg/dL)  364 H  352 H  ()  mg/dL














Assessment and Plan


Assessment: 





Acute intracranial hemorrhage, suspect hemorrhagic conversion of ischemic stroke


Computed tomography scan that was done on 07/08/2022 was negative for any acute 

findings but computed tomography scan done today on 07/10/2022 showed acute 

intracranial hemorrhage


Extensive discussion with neurology and family was done


Family wants to proceed with comfort measures only at this time


Patient daughter daughter Nir phone number is [phone] - no family was 

present at bedside today





Syncopal episode


Comfort measures As above 





Dysphagia


Comfort measures As above 








Chronic systolic cardiomyopathy recently diagnosed


EF of 25-30%


Comfort measures As above 








Diabetes mellitus


Comfort measures As above 








Urinary retention


Comfort measures As above 








Chronic stable medical condition


Hypertension


Operative


Hypothyroidism





CODE STATUS: Comfort measures only


DVT prophylaxis: None


Discharge planning: Comfort measures As above , palliative care  

consulted for discharge planning pending final recommendations.

## 2022-07-11 NOTE — P.CONS
History of Present Illness





- Reason for Consult


Consult date: 07/11/22


Goals of care


Requesting physician: Kemar Partida





- Chief Complaint


AMS





- History of Present Illness


The patient is a 77-year-old male with a past medical history significant for 

GERD, HTN, HLD, hypothyroidism and a prior CVA.  He was sent to the ER on 7/8/22

from his ECF after having a choking episode and becoming unresponsive.  He did 

have a recent hospital admission for a similar event. During his prior admission

he was also found to have acute systolic CHF with an EF of 25-30% and a NSTEMI. 

On arrival his head CT was unchanged and shows mild hydrocephalus was unchanged 

encephalomalacia to the anterior left frontal lobe.  Chest x-ray demonstrated 

mild interstitial edema.  EKG demonstrated sinus rhythm with PACs. Neurology was

consulted. He had an EEG that was suggestive of moderate encephalopathy of 

unknown etiology. A repeat head CT on 7/10/22 showed a left hemorrhagic infarct 

with mass effect and a 10mm midline shift. The patient was treated at that time 

with mannitol and DDAVP (the patient had one dose of ASA).  There were multiple 

attempts to contact the patient's daughter by the medical team.  The [facility] stroke team was also contacted for possible transfer if family agrees for

aggressive intervention.  Patient was then transferred to ICU. Shortly after 

transferring, the attending physician was able to speak to the patient's 

daughter, Supriya, who decided against any aggressive treatments.  























Review of Systems


ROS unobtainable: due to mental status





Past Medical History


Past Medical History: CVA/TIA, Diabetes Mellitus, GERD/Reflux, Hyperlipidemia, 

Hypertension, Thyroid Disorder


Additional Past Medical History / Comment(s): States had a stroke left arm and 

leg numbness


History of Any Multi-Drug Resistant Organisms: None Reported


Additional Past Surgical History / Comment(s): cataract surgery


Past Anesthesia/Blood Transfusion Reactions: No Reported Reaction


Smoking Status: Never smoker





- Past Family History


  ** Mother


Family Medical History: Diabetes Mellitus





Medications and Allergies


                                Home Medications











 Medication  Instructions  Recorded  Confirmed  Type


 


Thyroid,Pork [Goessel Thyroid] 90 mg PO DAILY 06/27/17 07/08/22 History


 


Aspirin EC [Ecotrin Low Dose] 81 mg PO HS 06/04/21 07/08/22 History


 


Finasteride [Proscar] 5 mg PO DAILY 06/04/21 07/08/22 History


 


Tamsulosin HCl [Flomax] 0.4 mg PO DAILY 06/04/21 07/08/22 History


 


Atorvastatin [Lipitor] 40 mg PO HS@2000 01/18/22 07/08/22 History


 


sitaGLIPtin [Januvia] 100 mg PO DAILY 01/18/22 07/08/22 History


 


Docusate [Colace] 100 mg PO HS 06/25/22 07/08/22 History


 


Multivitamins, Thera [Multivitamin 1 tab PO DAILY 06/25/22 07/08/22 History





(formulary)]    


 


Sennosides [Senokot] 8.6 mg PO DAILY 06/25/22 07/08/22 History


 


Clopidogrel [Plavix] 75 mg PO DAILY  tab 07/01/22 07/08/22 Rx


 


Isosorbide Mononitrate ER [Imdur] 30 mg PO DAILY  tab 07/01/22 07/08/22 Rx


 


Losartan [Cozaar] 25 mg PO HS  tab 07/01/22 07/08/22 Rx


 


Metoprolol Tartrate [Lopressor] 25 mg PO BID  tab 07/01/22 07/08/22 Rx


 


Furosemide [Lasix] 20 mg PO DAILY #30 tab 07/07/22 07/08/22 Rx


 


traMADol HCL 50 mg PO Q6H PRN #30 tab 07/07/22 07/08/22 Rx


 


Ipratropium-Albuterol Nebulize 3 ml INHALATION RT-Q4H PRN 07/08/22 07/08/22 

History





[Duoneb 0.5 mg-3 mg/3 ml Soln]    


 


Spironolactone [Aldactone] 12.5 mg PO TID 07/08/22 07/08/22 History








                                    Allergies











Allergy/AdvReac Type Severity Reaction Status Date / Time


 


No Known Allergies Allergy   Verified 07/08/22 10:37














Physical Exam


Vitals: 


                                   Vital Signs











  Temp Pulse Pulse Resp BP BP Pulse Ox


 


 07/11/22 10:00   98   16  129/64   96


 


 07/11/22 09:00   81   14  127/71   100


 


 07/11/22 08:00  98.9 F  86   15  133/59   97


 


 07/11/22 06:00   79   15    98


 


 07/11/22 05:00   93   16    98


 


 07/11/22 04:00  98.2 F  83   17  133/59   100


 


 07/11/22 03:00   84   17    100


 


 07/11/22 02:00   79   18    100


 


 07/11/22 01:00   82   16  140/65   100


 


 07/11/22 00:00   82   19    100


 


 07/10/22 23:00   81   17    100


 


 07/10/22 22:21   87   19    99


 


 07/10/22 22:00   86   19    99


 


 07/10/22 21:00   75   22    99


 


 07/10/22 20:00  98.7 F  100   20  148/68   98


 


 07/10/22 19:00   80   10 L    99


 


 07/10/22 18:00   86   8 L    98


 


 07/10/22 17:45     8 L   


 


 07/10/22 17:00   85   26 H    97


 


 07/10/22 16:00   89   22  136/84   99


 


 07/10/22 15:00   78   12  148/71   98


 


 07/10/22 14:00   81   33 H  137/64   99


 


 07/10/22 13:00   86   24  138/93   99


 


 07/10/22 12:00  99.5 F  93   24    97


 


 07/10/22 11:23  99.5 F   104 H  20   133/77  97








                                Intake and Output











 07/10/22 07/11/22 07/11/22





 22:59 06:59 14:59


 


Intake Total 73.196 80 106.486


 


Output Total 845 365 160


 


Balance -771.804 -285 -53.514


 


Intake:   


 


  IV 70 80 80


 


    .9 kvo 70 80 80


 


  Intake, IV Titration 3.196  26.486





  Amount   


 


    Morphine Sulfate (100 mg/ 3.196  26.486





    2 ml) 100 mg In Sodium   





    Chloride 0.9% 100 ml @ 1   





    MG/HR 1.02 mls/hr IV .   





    [facility][id number]   


 


Output:   


 


  Urine 845 365 160


 


Other:   


 


  Voiding Method Indwelling Catheter Indwelling Catheter Indwelling Catheter


 


  Weight  88 kg 











General: Patient unresponsive, appears comfortable


HEENT: Head is atraumatic, normocephalic Sclerae are clear. Pupils equal, round 

and reactive to light bilaterally.  


CV: Heart regular in rate and rhythm positive S1 and S2. No clicks, rubs or 

murmurs.


Lungs: Diminished bilateral bases. No wheezes rales or rhonchi. Respirations 

even and nonlabored. No intercostal retractions. 2L NC.


Abdomen/GI: Soft. Bowel sounds present in all 4 quadrants. Bowel sounds 

normoactive.


: Folley catheter with clear yellow urine present


Musculoskeletal/ Extremities: Normal tone and bulk


Vascular: Radial pulses equal. 2/4. Pedal pulses equal 1/4


Skin: Warm and dry. No rash. Face appears flushed.


Neurologic: Patient does not open eyes or follow commands.  With draws from pain

 with left upper and lower extremity.  No movement noted to right extremities. 


Psychiatric: Unable to assess.











Results


CBC & Chem 7: 


                                 07/10/22 07:30





                                 07/10/22 07:30


Labs: 


                  Abnormal Lab Results - Last 24 Hours (Table)











  07/10/22 07/10/22 Range/Units





  11:49 11:59 


 


POC Glucose (mg/dL)  364 H  352 H  ()  mg/dL











Chest x-ray: report reviewed


CT Scan - head: report reviewed





Assessment and Plan


Assessment: 





Social


* Occupation - retired


* Marital status - 


* Children/grandchildren - no biological children, one step daughterNabila


* Residence - [facility]


* ETOH - No


* Tobacco - Never smoked


* Illicit drugs - No








Symptoms


* Pain - CPOT 0, continue Morphine gtt


* Fatigue - generalized weakness


* SOB - KEVEN, appears comortable on 2L NC, continue Duoneb prn


* Insomnia - KEVEN


* N/V - KEVEN, continue Zofran prn


* Anxiety - KEVEN, appears calm, continue Ativan prn


* Depression - KEVEN


* Confusion - KEVEN


* Agitation - No


* Hallucinations - KEVEN


* Appetite/weight loss - NPO


* Dysphagia -KEVEN


* Constipation - KEVEN


* Incontinence - Kennedy catheter present


* Itch - KEVEN








Plan: 


Summary/Goals - No family was present at time of examination.  The patient is 

unresponsive, but appears calm and comfortable.  Spoke with his step daughter, 

Supriya, via telephone.  She stated that she has decided against any aggressive 

treatment/interventions for the patient.  She decided to make him comfortable.  

She has spoken to the hospice team already, and was planning to send him to 

[facility] with hospice.  She stated she spoke to several different people and 

was confused on the plan of care.  She was told that there may be problems with 

insurance if they transfer him.  Care manager and hospice team contacted.  The 

plan is to keep the patient on his Morphine drip for comfort, and keep him for 

inpatient hospice.  Supriya was updated via telephone.





Recommendations - GIP





Advanced Directives - Yes, on file





Code Status - DNR





Thank you for this consult





Erlinda Barajas [facility]


Palliative Care


\[facility]\""[place] [id number]


Email: [email or web address]





Time with Patient: Greater than 30

## 2022-07-12 RX ADMIN — LORAZEPAM PRN MG: 2 INJECTION INTRAMUSCULAR; INTRAVENOUS at 03:02

## 2022-07-12 RX ADMIN — SODIUM CHLORIDE SCH MLS/HR: 900 INJECTION, SOLUTION INTRAVENOUS at 20:24

## 2022-07-12 NOTE — P.PN
Subjective


Progress Note Date: 07/12/22


Hospital course:


This is a 77-year-old white male with history of multiple medical problems, 

nonverbal, patient was admitted on 7/8/22, admitted mostly with mental status 

change.  Apparently he was seen in the ER with episodes of unresponsiveness, and

was recently in the hospital for possible aspiration pneumonia.  CT of the brain

on admission showed mild hydrocephalus, and the patient was seen by neurology on

consultation on 7/8/2022.  The neurologist raised the possibility of syncopal 

episode, rule out seizure, and he also raised the possibility of CVA/left 

frontal/old.  Since admission the patient was seen by many consultants including

internal medicine, neurology, nephrology, and cardiology.  Today, the patient 

had a repeat CT of the brain, and it showed large left-sided hemorrhagic acute 

infarct with local mass effect and midline shift this is definitely new compared

to the CT of the brain which he had on his admission. 





Patient computed tomography scan reviewed with neurology and management plan 

discussed in detail.  Patient was noted to have acute intracranial bleed with 

suspected ischemic stroke with hemorrhagic conversion.  Patient was not 

receiving any anticoagulation in the hospital did not receive any falls or any 

trauma to head.  Patient was not on any antiplatelet agents orally since he was 

nothing by mouth he did receive 1 rectal aspirin yesterday per nursing staff.  

Patient was started on blood pressure lowering treatment, mannitol, IV Keppra fo

r seizure prophylaxis, he was also given desmopressin, patient daughter was 

contacted and also [facility] stroke team was contacted for possible transfe

if family agrees for aggressive intervention.  Patient was then transferred to 

ICU





Multiple attempts were made to contact patient daughter in the morning by 

neurology nursing staff and by myself apparently she was in the Religion and she 

did not receive any phone calls but later on she heard a voice messages and call

me back at 12:19 PM case was discussed in detail with her and I updated her with

recent developments, at this point she does not want to proceed with any 

aggressive interventions and would like to proceed with hospice and comfort 

measures only.Palliative care consulted





Patient daughter yoshi Loyola phone number is [phone]





Subjective:


Patient was examined at the bedside.  He is currently lethargic and unresponsive

on morphine drip.





Physical exam


General: Patient is confused and nonverbal exam grossly nonfocal


Head: atraumatic, normocephalic, symmetric


Eyes: no lid lesion], anicteric sclera


Mouth: no lip lesion, mucus membranes moist


Cardiovascular: S1S2 reg rate and rhythm, no murmur, no gallop


Lungs: Bilateral equal air entry, no wheezing no rhonchi no crackles.


Abdominal: soft,  nontender to palpation, no guarding, no appreciable 

organomegaly


Ext: no gross muscle atrophy, no edema extremities warm to suppose a positive


Neuro: Alert oriented x 0, patient exam grossly nonfocal but full examination 

cannot be done due to clinical condition








ssessment and plan





Acute intracranial hemorrhage, suspect hemorrhagic conversion of ischemic stroke


Computed tomography scan that was done on 07/08/2022 was negative for any acute 

findings but computed tomography scan done today on 07/10/2022 showed acute 

intracranial hemorrhage





Patient daughter daughter Nir phone number is [phone]





Syncopal episode


Comfort measures As above 





Dysphagia


Comfort measures As above 








Chronic systolic cardiomyopathy recently diagnosed


EF of 25-30%


Comfort measures As above 








Diabetes mellitus


Comfort measures As above 








Urinary retention


Comfort measures As above 








Chronic stable medical condition


Hypertension


Operative


Hypothyroidism





CODE STATUS: Comfort measures only


Resume inpatient hospice care








Objective





- Vital Signs


Vital signs: 


                                   Vital Signs











Temp  99.4 F   07/12/22 11:03


 


Pulse  86   07/11/22 20:00


 


Resp  8 L  07/12/22 11:03


 


BP  128/64   07/11/22 20:00


 


Pulse Ox  100   07/12/22 04:32


 


FiO2  21   07/08/22 18:03








                                 Intake & Output











 07/11/22 07/12/22 07/12/22





 18:59 06:59 18:59


 


Intake Total 221.013 33.26 


 


Output Total 390 750 


 


Balance -168.987 -716.74 


 


Weight 88 kg  


 


Intake:   


 


   20 


 


    .9 kvo 160 20 


 


  Intake, IV Titration 61.013 13.26 





  Amount   


 


    Morphine Sulfate (100 mg/ 61.013 13.26 





    2 ml) 100 mg In Sodium   





    Chloride 0.9% 100 ml @ 1   





    MG/HR 1.02 mls/hr IV .   





    Q24H [facility] Rx#:[id number]   


 


Output:   


 


  Urine 390 750 


 


Other:   


 


  Voiding Method Indwelling Catheter Indwelling Catheter Indwelling Catheter














- Labs


CBC & Chem 7: 


                                 07/10/22 07:30





                                 07/10/22 07:30

## 2022-07-12 NOTE — P.PN
Subjective


Progress Note Date: 07/12/22


Principal diagnosis: 


CVA





The patient is a 77-year-old male with a past medical history significant for 

GERD, HTN, HLD, hypothyroidism and a prior CVA.  He was sent to the ER on 7/8/22

from his ECF after having a choking episode and becoming unresponsive.  He did 

have a recent hospital admission for a similar event. During his prior admission

he was also found to have acute systolic CHF with an EF of 25-30% and a NSTEMI. 

On arrival his head CT was unchanged and shows mild hydrocephalus was unchanged 

encephalomalacia to the anterior left frontal lobe.  Chest x-ray demonstrated 

mild interstitial edema.  EKG demonstrated sinus rhythm with PACs. Neurology was

consulted. He had an EEG that was suggestive of moderate encephalopathy of 

unknown etiology. A repeat head CT on 7/10/22 showed a left hemorrhagic infarct 

with mass effect and a 10mm midline shift. The patient was treated at that time 

with mannitol and DDAVP (the patient had one dose of ASA).  There were multiple 

attempts to contact the patient's daughter by the medical team.  The [facility] stroke team was also contacted for possible transfer if family agrees for

aggressive intervention.  Patient was then transferred to ICU. Shortly after 

transferring, the attending physician was able to speak to the patient's 

daughter, Supriya, who decided against any aggressive treatments.  





7/11 No family was present at time of examination.  The patient is unresponsive,

but appears calm and comfortable.  Spoke with his step daughter, Supriya, via 

telephone.  She stated that she has decided against any aggressive 

treatment/interventions for the patient.  She decided to make him comfortable.  

She has spoken to the hospice team already, and was planning to send him to 

[facility] with hospice.  She stated she spoke to several different people and 

was confused on the plan of care.  She was told that there may be problems with 

insurance if they transfer him.  Care manager and hospice team contacted.  The 

plan is to keep the patient on his Morphine drip for comfort, and keep him for 

inpatient hospice.  Supriya was updated via telephone.








Objective





- Vital Signs


Vital signs: 


                                   Vital Signs











Temp  98.6 F   07/12/22 04:32


 


Pulse  86   07/11/22 20:00


 


Resp  16   07/12/22 04:32


 


BP  128/64   07/11/22 20:00


 


Pulse Ox  100   07/12/22 04:32


 


FiO2  21   07/08/22 18:03








                                 Intake & Output











 07/11/22 07/12/22 07/12/22





 18:59 06:59 18:59


 


Intake Total 221.013 33.26 


 


Output Total 390 750 


 


Balance -168.987 -716.74 


 


Weight 88 kg  


 


Intake:   


 


   20 


 


    .9 kvo 160 20 


 


  Intake, IV Titration 61.013 13.26 





  Amount   


 


    Morphine Sulfate (100 mg/ 61.013 13.26 





    2 ml) 100 mg In Sodium   





    Chloride 0.9% 100 ml @ 1   





    MG/HR 1.02 mls/hr IV .   





    Q24H [facility] Rx#:[id number]   


 


Output:   


 


  Urine 390 750 


 


Other:   


 


  Voiding Method Indwelling Catheter Indwelling Catheter 














- Exam


General: Patient unresponsive, appears comfortable


HEENT: Head is atraumatic, normocephalic Sclerae are clear. Pupils equal, round 

and reactive to light bilaterally.  


CV: Heart regular in rate and rhythm positive S1 and S2. No clicks, rubs or 

murmurs.


Lungs: Diminished bilateral bases. No wheezes rales or rhonchi. Respirations 

uneven with periods of apnea.2L NC.


Abdomen/GI: Soft. Bowel sounds present in all 4 quadrants. Bowel sounds 

hypoactive.


: Kennedy catheter with clear yellow urine present


Vascular: Radial pulses equal. 2/4. Pedal pulses equal 1/4


Skin: Warm and dry. No rash. 


Neurologic: Patient does not open eyes or follow commands.  With draws from pain

with left upper and lower extremity.  No movement noted to right extremities. 


Psychiatric: Unable to assess.








- Labs


CBC & Chem 7: 


                                 07/10/22 07:30





                                 07/10/22 07:30





Assessment and Plan


Assessment: 


Symptoms


* Pain - CPOT 0, continue Morphine gtt


* Fatigue - 


* SOB - KEVEN, appears comortable on 2L NC, continue Duoneb prn


* Insomnia - KEVEN


* N/V - KEVEN, continue Zofran prn


* Anxiety - appears calm, continue Ativan prn


* Depression - KEVEN


* Confusion - KEVEN


* Agitation - No


* Hallucinations - KEVEN


* Appetite/weight loss - NPO


* Dysphagia -KEVEN


* Constipation - KEVEN


* Incontinence - Kennedy catheter present


* Itch - KEVEN








Plan: 


Summary/Goals - No family was present at time of examination.  The patient is 

unresponsive, but appears calm and comfortable.  Per  -The patient 

cannot return to ECF with hospice on a morphine drip.  The plan was then to keep

the patient in the hospital with hospice.  However, when they attempted to make 

the patient GIP yesterday, the hospice social worker stated that the patient was

already comfortable and was not GIP appropriate.   is reaching out 

to the attending physician to determine the plan of care.





Recommendations - GIP





Advanced Directives - Yes, on file





Code Status - DNR





Thank you for this consult





Erlinda Barajas [facility]


Palliative Care


[facility] [id number]


Email: [email or web address]





Time with Patient: Less than 30

## 2022-07-13 VITALS — TEMPERATURE: 99.7 F

## 2022-07-13 RX ADMIN — SODIUM CHLORIDE SCH MLS/HR: 900 INJECTION, SOLUTION INTRAVENOUS at 15:50

## 2022-07-13 NOTE — P.PN
Subjective


Hospital course:


This is a 77-year-old white male with history of multiple medical problems, 

nonverbal, patient was admitted on 7/8/22, admitted mostly with mental status 

change.  Apparently he was seen in the ER with episodes of unresponsiveness, and

was recently in the hospital for possible aspiration pneumonia.  CT of the brain

on admission showed mild hydrocephalus, and the patient was seen by neurology on

consultation on 7/8/2022.  The neurologist raised the possibility of syncopal 

episode, rule out seizure, and he also raised the possibility of CVA/left 

frontal/old.  Since admission the patient was seen by many consultants including

internal medicine, neurology, nephrology, and cardiology.  Today, the patient 

had a repeat CT of the brain, and it showed large left-sided hemorrhagic acute 

infarct with local mass effect and midline shift this is definitely new compared

to the CT of the brain which he had on his admission. 





Patient computed tomography scan reviewed with neurology and management plan 

discussed in detail.  Patient was noted to have acute intracranial bleed with 

suspected ischemic stroke with hemorrhagic conversion.  Patient was not 

receiving any anticoagulation in the hospital did not receive any falls or any 

trauma to head.  Patient was not on any antiplatelet agents orally since he was 

nothing by mouth he did receive 1 rectal aspirin yesterday per nursing staff.  

Patient was started on blood pressure lowering treatment, mannitol, IV Keppra 

for seizure prophylaxis, he was also given desmopressin, patient daughter was 

contacted and also [facility] stroke team was contacted for possible transfe

if family agrees for aggressive intervention.  Patient was then transferred to 

ICU





Multiple attempts were made to contact patient daughter in the morning by 

neurology nursing staff and by myself apparently she was in the [employer] and she 

did not receive any phone calls but later on she heard a voice messages and call

me back at 12:19 PM case was discussed in detail with her and I updated her with

recent developments, at this point she does not want to proceed with any 

aggressive interventions and would like to proceed with hospice and comfort 

measures only.Palliative care consulted





Patient daughter yoshi Loyola phone number is [phone]





Subjective:


Patient was examined at the bedside.  He is currently lethargic and unresponsive

on morphine drip.





Physical exam


General: Patient is confused and nonverbal exam grossly nonfocal


Head: atraumatic, normocephalic, symmetric


Eyes: no lid lesion], anicteric sclera


Mouth: no lip lesion, mucus membranes moist


Cardiovascular: S1S2 reg rate and rhythm, no murmur, no gallop


Lungs: Bilateral equal air entry, no wheezing no rhonchi no crackles.


Abdominal: soft,  nontender to palpation, no guarding, no appreciable 

organomegaly


Ext: no gross muscle atrophy, no edema extremities warm to suppose a positive


Neuro: Alert oriented x 0, patient exam grossly nonfocal but full examination 

cannot be done due to clinical condition








ssessment and plan





Acute intracranial hemorrhage, suspect hemorrhagic conversion of ischemic stroke


Computed tomography scan that was done on 07/08/2022 was negative for any acute 

findings but computed tomography scan done today on 07/10/2022 showed acute 

intracranial hemorrhage





Patient daughter daughter Nir phone number is [phone]





Syncopal episode


Comfort measures As above 





Dysphagia


Comfort measures As above 








Chronic systolic cardiomyopathy recently diagnosed


EF of 25-30%


Comfort measures As above 








Diabetes mellitus


Comfort measures As above 








Urinary retention


Comfort measures As above 








Chronic stable medical condition


Hypertension


Operative


Hypothyroidism





CODE STATUS: Comfort measures only


Resume inpatient hospice care








Objective





- Vital Signs


Vital signs: 


                                   Vital Signs











Temp  99.7 F H  07/13/22 05:38


 


Pulse  86   07/11/22 20:00


 


Resp  8 L  07/12/22 11:03


 


BP  128/64   07/11/22 20:00


 


Pulse Ox  100   07/12/22 04:32


 


FiO2  21   07/08/22 18:03








                                 Intake & Output











 07/12/22 07/13/22 07/13/22





 18:59 06:59 18:59


 


Intake Total  400 77.833


 


Output Total 350  


 


Balance -350 400 77.833


 


Intake:   


 


  Intake, IV Titration   77.833





  Amount   


 


    Morphine Sulfate 100 mg   77.833





    In Sodium Chloride 0.9%   





    90 ml @ 1 MG/HR 1 mls/hr   





    IV .Q24H [facility] Rx#:   





    [id number]   


 


  Oral  400 


 


Output:   


 


  Urine 350  


 


Other:   


 


  Voiding Method Indwelling Catheter Indwelling Catheter Indwelling Catheter














- Labs


CBC & Chem 7: 


                                 07/10/22 07:30





                                 07/10/22 07:30

## 2022-07-13 NOTE — P.PN
Subjective


Progress Note Date: 07/13/22


Principal diagnosis: 


CVA





The patient is a 77-year-old male with a past medical history significant for 

GERD, HTN, HLD, hypothyroidism and a prior CVA.  He was sent to the ER on 7/8/22

from his ECF after having a choking episode and becoming unresponsive.  He did 

have a recent hospital admission for a similar event. During his prior admission

he was also found to have acute systolic CHF with an EF of 25-30% and a NSTEMI. 

On arrival his head CT was unchanged and shows mild hydrocephalus was unchanged 

encephalomalacia to the anterior left frontal lobe.  Chest x-ray demonstrated 

mild interstitial edema.  EKG demonstrated sinus rhythm with PACs. Neurology was

consulted. He had an EEG that was suggestive of moderate encephalopathy of 

unknown etiology. A repeat head CT on 7/10/22 showed a left hemorrhagic infarct 

with mass effect and a 10mm midline shift. The patient was treated at that time 

with mannitol and DDAVP (the patient had one dose of ASA).  There were multiple 

attempts to contact the patient's daughter by the medical team.  The [facility] stroke team was also contacted for possible transfer if family agrees for

aggressive intervention.  Patient was then transferred to ICU. Shortly after 

transferring, the attending physician was able to speak to the patient's 

daughter, Supriya, who decided against any aggressive treatments.  





7/11 No family was present at time of examination.  The patient is unresponsive,

but appears calm and comfortable.  Spoke with his step daughter, Supriya, via 

telephone.  She stated that she has decided against any aggressive 

treatment/interventions for the patient.  She decided to make him comfortable.  

She has spoken to the hospice team already, and was planning to send him to 

[facility] with hospice.  She stated she spoke to several different people and 

was confused on the plan of care.  She was told that there may be problems with 

insurance if they transfer him.  Care manager and hospice team contacted.  The 

plan is to keep the patient on his Morphine drip for comfort, and keep him for 

inpatient hospice.  Supriya was updated via telephone.





7/12  No family was present at time of examination.  The patient is 

unresponsive, but appears calm and comfortable.  Per  -The patient 

cannot return to ECF with hospice on a morphine drip.  The plan was then to keep

the patient in the hospital with hospice.  However, when they attempted to make 

the patient GIP yesterday, the hospice social worker stated that the patient was

already comfortable and was not GIP appropriate.   is reaching out 

to the attending physician to determine the plan of care.








Objective





- Vital Signs


Vital signs: 


                                   Vital Signs











Temp  99.7 F H  07/13/22 05:38


 


Pulse  86   07/11/22 20:00


 


Resp  8 L  07/12/22 11:03


 


BP  128/64   07/11/22 20:00


 


Pulse Ox  100   07/12/22 04:32


 


FiO2  21   07/08/22 18:03








                                 Intake & Output











 07/12/22 07/13/22 07/13/22





 18:59 06:59 18:59


 


Intake Total  400 77.833


 


Output Total 350  


 


Balance -350 400 77.833


 


Intake:   


 


  Intake, IV Titration   77.833





  Amount   


 


    Morphine Sulfate 100 mg   77.833





    In Sodium Chloride 0.9%   





    90 ml @ 1 MG/HR 1 mls/hr   





    IV .Q24H [facility] Rx#:   





    [id number]   


 


  Oral  400 


 


Output:   


 


  Urine 350  


 


Other:   


 


  Voiding Method Indwelling Catheter Indwelling Catheter Indwelling Catheter














- Exam


General: Patient unresponsive, appears comfortable


HEENT: Head is atraumatic, normocephalic Sclerae are clear. Pupils equal, round 

and reactive to light bilaterally.  


CV: Heart regular in rate and rhythm positive S1 and S2. No clicks, rubs or 

murmurs.


Lungs: Diminished bilateral bases. No wheezes rales or rhonchi. Respirations 

uneven with long periods of apnea. 2L NC.


Abdomen/GI: Soft. Bowel sounds present in all 4 quadrants. Bowel sounds 

hypoactive.


: Kennedy catheter with clear yellow urine present


Vascular: Radial pulses equal. 2/4. Pedal pulses equal 1/4


Skin: Warm and dry. No rash. Mottling to lower extremities noted.


Neurologic: Patient does not open eyes or follow commands.  Withdraws from pain 

with left upper and lower extremity.  No movement noted to right extremities. 


Psychiatric: Unable to assess.








- Labs


CBC & Chem 7: 


                                 07/10/22 07:30





                                 07/10/22 07:30





Assessment and Plan


Assessment: 


Symptoms


* Pain - CPOT 0, continue Morphine gtt


* Fatigue - KEVEN


* SOB - KEVEN, appears comortable on 2L NC, continue Duoneb prn


* Insomnia - KEVEN


* N/V - KEVEN, continue Zofran prn


* Anxiety - appears calm, continue Ativan prn


* Depression - KEVEN


* Confusion - KEVEN


* Agitation - No


* Hallucinations - KEVEN


* Appetite/weight loss - NPO


* Dysphagia -KEVEN


* Constipation - KEVEN


* Incontinence - Kennedy catheter present


* Itch - KEVEN








Plan: 


Summary/Goals -  No family present at time of examination.  Patient 

unresponsive, but appears comfortable.  Patient with agonal breathing and long 

periods of apnea. Patient to remain here with comfort care.  





Recommendations - Hospice





Advanced Directives - Yes, on file





Code Status - DNR





Thank you for this consult





Erlinda Barajas [facility]


Palliative Care


[facility] [id number]


Email: [email or web address]





Time with Patient: Less than 30

## 2022-07-14 RX ADMIN — MORPHINE SULFATE SCH MLS/HR: 50 INJECTION, SOLUTION, CONCENTRATE INTRAVENOUS at 08:32

## 2022-07-14 NOTE — P.PN
Subjective


Progress Note Date: 07/14/22


77-year-old male who was admitted 4 days ago with altered mental status 

secondary to intracranial hemorrhage.  Given his poor prognosis and rapid 

clinical decline, he was transitioned to inpatient hospice care a few days ago. 

He has been on a continuous morphine drip last 2 days.  He is unresponsive but a

ppears to be comfortable and in no acute distress








Objective





- Vital Signs


Vital signs: 


                                   Vital Signs











Temp  99.7 F H  07/13/22 05:38


 


Pulse  86   07/11/22 20:00


 


Resp  16   07/14/22 06:11


 


BP  128/64   07/11/22 20:00


 


Pulse Ox  100   07/12/22 04:32


 


FiO2  21   07/08/22 18:03








                                 Intake & Output











 07/13/22 07/14/22 07/14/22





 18:59 06:59 18:59


 


Intake Total 100.000  


 


Output Total 300 125 


 


Balance -200.000 -125 


 


Intake:   


 


  Intake, IV Titration 100.000  





  Amount   


 


    Morphine Sulfate 100 mg 100.000  





    In Sodium Chloride 0.9%   





    90 ml @ 1 MG/HR 1 mls/hr   





    IV .Q24H [facility] Rx#:   





    [id number]   


 


Output:   


 


  Urine 300 125 


 


Other:   


 


  Voiding Method Indwelling Catheter Indwelling Catheter Indwelling Catheter














- Exam


General: Obtunded, minimal response to painful stimuli, in no acute distress


Head: atraumatic, normocephalic, symmetric


Eyes: Pupils pinpoint and equal, sclerae normal


Mouth: Dry mucous membranes


Cardiovascular: S1S2 reg rate and rhythm, no murmur, no gallop


Lungs: Bilateral equal air entry, no wheezing no rhonchi no crackles.


Abdominal: soft,  nontender to palpation, no guarding, no appreciable 

organomegaly


Ext: no gross muscle atrophy, no edema


Neuro: obtunded, minimal response to pain, 








- Labs


CBC & Chem 7: 


                                 07/10/22 07:30





                                 07/10/22 07:30





Assessment and Plan


Assessment: 





Acute intracranial hemorrhage


 suspect hemorrhagic conversion of ischemic stroke


Computed tomography scan that was done on 07/08/2022 was negative for any acute 

findings but computed tomography scan done on 07/10/2022 showed acute intra

cranial hemorrhage


comfort care measure


continue Morphine drip


further recommendation per hospice





Syncopal episode


Secondary to above


Comfort measures As above 





Chronic systolic cardiomyopathy recently diagnosed


EF of 25-30%


Comfort measures As above 








Diabetes mellitus


Comfort measures As above 








Urinary retention


Comfort measures As above 








Chronic stable medical condition


Hypertension


Operative


Hypothyroidism





CODE STATUS: Comfort measures only


Resume inpatient hospice care


Time with Patient: Less than 30

## 2022-07-14 NOTE — P.PN
Subjective


Progress Note Date: 07/14/22


Principal diagnosis: 


CVA





The patient is a 77-year-old male with a past medical history significant for 

GERD, HTN, HLD, hypothyroidism and a prior CVA.  He was sent to the ER on 7/8/22

from his ECF after having a choking episode and becoming unresponsive.  He did 

have a recent hospital admission for a similar event. During his prior admission

he was also found to have acute systolic CHF with an EF of 25-30% and a NSTEMI. 

On arrival his head CT was unchanged and shows mild hydrocephalus was unchanged 

encephalomalacia to the anterior left frontal lobe.  Chest x-ray demonstrated 

mild interstitial edema.  EKG demonstrated sinus rhythm with PACs. Neurology was

consulted. He had an EEG that was suggestive of moderate encephalopathy of 

unknown etiology. A repeat head CT on 7/10/22 showed a left hemorrhagic infarct 

with mass effect and a 10mm midline shift. The patient was treated at that time 

with mannitol and DDAVP (the patient had one dose of ASA).  There were multiple 

attempts to contact the patient's daughter by the medical team.  The [facility] stroke team was also contacted for possible transfer if family agrees for

aggressive intervention.  Patient was then transferred to ICU. Shortly after 

transferring, the attending physician was able to speak to the patient's 

daughter, Supriya, who decided against any aggressive treatments.  





7/11 No family was present at time of examination.  The patient is unresponsive,

but appears calm and comfortable.  Spoke with his step daughter, Supriya, via 

telephone.  She stated that she has decided against any aggressive 

treatment/interventions for the patient.  She decided to make him comfortable.  

She has spoken to the hospice team already, and was planning to send him to 

[facility] with hospice.  She stated she spoke to several different people and 

was confused on the plan of care.  She was told that there may be problems with 

insurance if they transfer him.  Care manager and hospice team contacted.  The 

plan is to keep the patient on his Morphine drip for comfort, and keep him for 

inpatient hospice.  Supriya was updated via telephone.





7/12  No family was present at time of examination.  The patient is 

unresponsive, but appears calm and comfortable.  Per  -The patient 

cannot return to ECF with hospice on a morphine drip.  The plan was then to keep

the patient in the hospital with hospice.  However, when they attempted to make 

the patient GIP yesterday, the hospice social worker stated that the patient was

already comfortable and was not GIP appropriate.   is reaching out 

to the attending physician to determine the plan of care.





7/13 No family present at time of examination.  Patient unresponsive, but 

appears comfortable.  Patient to remain here with comfort care.  








Objective





- Vital Signs


Vital signs: 


                                   Vital Signs











Temp  99.7 F H  07/13/22 05:38


 


Pulse  86   07/11/22 20:00


 


Resp  16   07/14/22 06:11


 


BP  128/64   07/11/22 20:00


 


Pulse Ox  100   07/12/22 04:32


 


FiO2  21   07/08/22 18:03








                                 Intake & Output











 07/13/22 07/14/22 07/14/22





 18:59 06:59 18:59


 


Intake Total 100.000  


 


Output Total 300 125 


 


Balance -200.000 -125 


 


Intake:   


 


  Intake, IV Titration 100.000  





  Amount   


 


    Morphine Sulfate 100 mg 100.000  





    In Sodium Chloride 0.9%   





    90 ml @ 1 MG/HR 1 mls/hr   





    IV .Q24H [facility] Rx#:   





    [id number]   


 


Output:   


 


  Urine 300 125 


 


Other:   


 


  Voiding Method Indwelling Catheter Indwelling Catheter Indwelling Catheter














- Exam


General: Patient unresponsive, appears comfortable


HEENT: Head is atraumatic, normocephalic Sclerae are clear. Pupils equal, round 

and reactive to light bilaterally- sluggish.  


CV: Heart regular in rate and rhythm positive S1 and S2. No clicks, rubs or 

murmurs.


Lungs: Diminished bilateral bases. No wheezes rales or rhonchi. + tachypneic. 2L

NC.


Abdomen/GI: Soft. Bowel sounds present in all 4 quadrants. Bowel sounds 

hypoactive.


: Kennedy catheter with shannan urine present


Vascular: Radial pulses equal. 2/4. Pedal pulses equal 1/4. Feet cool to touch


Skin: Warm and dry. No rash. 


Neurologic: Patient does not open eyes or follow commands.  Patient grimaces and

barely withdraws with left upper extremity from painful stimulus.  


Psychiatric: Unable to assess.








- Labs


CBC & Chem 7: 


                                 07/10/22 07:30





                                 07/10/22 07:30





Assessment and Plan


Assessment: 


Symptoms


* Pain - CPOT 0, continue Morphine gtt


* Fatigue - KEVEN


* SOB - KEVEN, appears comortable on 2L NC, continue Duoneb prn


* Insomnia - KEVEN


* N/V - KEVEN, continue Zofran prn


* Anxiety - appears calm, continue Ativan prn


* Depression - KEVEN


* Confusion - KEVEN


* Agitation - No


* Hallucinations - KEVEN


* Appetite/weight loss - NPO


* Dysphagia -KEVEN


* Constipation - KEVEN


* Incontinence - Kennedy catheter present


* Itch - KEVEN








Plan: 


Summary/Goals -  Patient resting in bed and appears comfortable.  Patient on 

comfort medications.  Attempted to call patient's daughter, Nabila, to update her 

but now answer. Left message on voicemail. 





Recommendations - IP hospice





Advanced Directives - Yes, on file





Code Status - DNR





Thank you for this consult





Erlinda Barajas [facility]


Palliative Care


[address] [id number]


Email: [email or web address]





Time with Patient: Less than 30

## 2022-07-15 ENCOUNTER — HOSPITAL ENCOUNTER (INPATIENT)
Dept: HOSPITAL 47 - 5NMEDONC | Age: 77
DRG: 951 | End: 2022-07-15
Attending: INTERNAL MEDICINE | Admitting: INTERNAL MEDICINE
Payer: MEDICAID

## 2022-07-15 VITALS — RESPIRATION RATE: 16 BRPM

## 2022-07-15 DIAGNOSIS — E78.5: ICD-10-CM

## 2022-07-15 DIAGNOSIS — Z86.73: ICD-10-CM

## 2022-07-15 DIAGNOSIS — R33.9: ICD-10-CM

## 2022-07-15 DIAGNOSIS — Z51.5: Primary | ICD-10-CM

## 2022-07-15 DIAGNOSIS — I42.8: ICD-10-CM

## 2022-07-15 DIAGNOSIS — Z66: ICD-10-CM

## 2022-07-15 DIAGNOSIS — K21.9: ICD-10-CM

## 2022-07-15 DIAGNOSIS — E03.9: ICD-10-CM

## 2022-07-15 DIAGNOSIS — G93.89: ICD-10-CM

## 2022-07-15 DIAGNOSIS — G91.9: ICD-10-CM

## 2022-07-15 DIAGNOSIS — I11.0: ICD-10-CM

## 2022-07-15 DIAGNOSIS — I61.9: ICD-10-CM

## 2022-07-15 DIAGNOSIS — I50.21: ICD-10-CM

## 2022-07-15 DIAGNOSIS — R13.10: ICD-10-CM

## 2022-07-15 DIAGNOSIS — E11.9: ICD-10-CM

## 2022-07-15 DIAGNOSIS — I21.4: ICD-10-CM

## 2022-07-15 DIAGNOSIS — I63.9: ICD-10-CM

## 2022-07-15 RX ADMIN — MORPHINE SULFATE SCH MLS/HR: 50 INJECTION, SOLUTION, CONCENTRATE INTRAVENOUS at 02:51

## 2022-07-15 NOTE — P.HPIM
History of Present Illness


H&P Date: 07/15/22


Chief Complaint: altered mental status


Patient is a 77-year-old male with diabetes, prior CVA, GERD, hypertension, 

dyslipidemia, and hypothyroidism who was sent to the emergency room after having

a choking episode of becoming unresponsive at his skilled nursing facility.  

Patient had recently been hospitalized here from 6/25 through 7/7 for a similar 

event.  He was found to have dysphagia and had a choking episode.  He had been 

placed on a modified diet and had done well during his hospital stay.  He had 

also been found to have acute systolic congestive heart failure with an ejection

fraction of 25-30% and a non-ST segment elevated myocardial infarction.  On 

arrival his vital signs are within normal limits.  Laboratory analysis revealed 

a white blood cell count of 12.3 up mildly from yesterday.  Sodium level was 

stable at 132.  Glucose was elevated at 279.  His troponin was mildly elevated 

at 0.148 but down trending from his last hospital stay were maxed at 13.  Head 

CT was unchanged and shows mild hydrocephalus was unchanged encephalomalacia to 

the anterior left frontal lobe.  His mentation worsened over the following 24-48

hrs, repeat head CT showed hemorrhaging conversion, patient was transitioned to 

comfort care measures, started on a Morphine drip and eventually transitioned to

GIP hospice admission as his opiate requirement increased. he is currently 

obtunded but appears to be very comfortable. 








Review of Systems


ROS unobtainable: due to mental status





Past Medical History


Past Medical History: CVA/TIA, Diabetes Mellitus, GERD/Reflux, Hyperlipidemia, 

Hypertension, Thyroid Disorder


Additional Past Medical History / Comment(s): States had a stroke left arm and 

leg numbness


History of Any Multi-Drug Resistant Organisms: None Reported


Additional Past Surgical History / Comment(s): cataract surgery


Past Anesthesia/Blood Transfusion Reactions: No Reported Reaction


Smoking Status: Never smoker





- Past Family History


  ** Mother


Family Medical History: Diabetes Mellitus





Medications and Allergies


                                Home Medications











 Medication  Instructions  Recorded  Confirmed  Type


 


Unable To Assess [Unable to Assess]  07/15/22 07/15/22 History








                                    Allergies











Allergy/AdvReac Type Severity Reaction Status Date / Time


 


No Known Allergies Allergy   Verified 07/15/22 15:31














Physical Exam


Osteopathic Statement: *.  No significant issues noted on an osteopathic 

structural exam other than those noted in the History and Physical/Consult.


Vitals: 


                                Intake and Output











 07/15/22 07/15/22 07/15/22





 06:59 14:59 22:59


 


Other:   


 


  Weight  88 kg 











General: Obtunded, minimal response to painful stimuli, in no acute distress


Head: atraumatic, normocephalic, symmetric


Eyes: Pupils pinpoint and equal, sclerae normal


Mouth: Dry mucous membranes


Cardiovascular: S1S2 reg rate and rhythm, no murmur, no gallop


Lungs: Bilateral equal air entry, no wheezing no rhonchi no crackles.


Abdominal: soft,  nontender to palpation, no guarding, no appreciable 

organomegaly


Ext: no gross muscle atrophy, no edema


Neuro: obtunded, minimal response to pain








Assessment and Plan


Plan: 





Acute intracranial hemorrhage


 suspect hemorrhagic conversion of ischemic stroke


Computed tomography scan that was done on 07/08/2022 was negative for any acute 

findings but computed tomography scan done on 07/10/2022 showed acute 

intracranial hemorrhage


comfort care measure


continue Morphine drip


further recommendation per hospice





Syncopal episode


Secondary to above


Comfort measures As above 





Chronic systolic cardiomyopathy recently diagnosed


EF of 25-30%


Comfort measures As above 








Diabetes mellitus


Comfort measures As above 








Urinary retention


Comfort measures As above 








Chronic stable medical condition


Hypertension


Operative


Hypothyroidism





Time with Patient: Less than 30

## 2022-07-15 NOTE — P.PN
Subjective


Progress Note Date: 07/15/22


Principal diagnosis: 


CVA





The patient is a 77-year-old male with a past medical history significant for 

GERD, HTN, HLD, hypothyroidism and a prior CVA.  He was sent to the ER on 7/8/22

from his ECF after having a choking episode and becoming unresponsive.  He did 

have a recent hospital admission for a similar event. During his prior admission

he was also found to have acute systolic CHF with an EF of 25-30% and a NSTEMI. 

On arrival his head CT was unchanged and shows mild hydrocephalus was unchanged 

encephalomalacia to the anterior left frontal lobe.  Chest x-ray demonstrated 

mild interstitial edema.  EKG demonstrated sinus rhythm with PACs. Neurology was

consulted. He had an EEG that was suggestive of moderate encephalopathy of 

unknown etiology. A repeat head CT on 7/10/22 showed a left hemorrhagic infarct 

with mass effect and a 10mm midline shift. The patient was treated at that time 

with mannitol and DDAVP (the patient had one dose of ASA).  There were multiple 

attempts to contact the patient's daughter by the medical team.  The [facility] stroke team was also contacted for possible transfer if family agrees for

aggressive intervention.  Patient was then transferred to ICU. Shortly after 

transferring, the attending physician was able to speak to the patient's 

daughter, Supriya, who decided against any aggressive treatments.  





7/11 No family was present at time of examination.  The patient is unresponsive,

but appears calm and comfortable.  Spoke with his step daughter, Supriya, via 

telephone.  She stated that she has decided against any aggressive 

treatment/interventions for the patient.  She decided to make him comfortable.  

She has spoken to the hospice team already, and was planning to send him to 

[facility] with hospice.  She stated she spoke to several different people and 

was confused on the plan of care.  She was told that there may be problems with 

insurance if they transfer him.  Care manager and hospice team contacted.  The 

plan is to keep the patient on his Morphine drip for comfort, and keep him for 

inpatient hospice.  Supriya was updated via telephone.





7/12  No family was present at time of examination.  The patient is 

unresponsive, but appears calm and comfortable.  Per  -The patient 

cannot return to ECF with hospice on a morphine drip.  The plan was then to keep

the patient in the hospital with hospice.  However, when they attempted to make 

the patient GIP yesterday, the hospice social worker stated that the patient was

already comfortable and was not GIP appropriate.   is reaching out 

to the attending physician to determine the plan of care.





7/13 No family present at time of examination.  Patient unresponsive, but 

appears comfortable.  Patient to remain here with comfort care.  





7/14 Patient resting in bed and appears comfortable.  Patient on comfort 

medications.  Attempted to call patient's daughter, Nabila, to update her but now 

answer. Left message on voicemail. 











Objective





- Vital Signs


Vital signs: 


                                   Vital Signs











Temp  99.7 F H  07/13/22 05:38


 


Pulse  86   07/11/22 20:00


 


Resp  16   07/15/22 05:11


 


BP  128/64   07/11/22 20:00


 


Pulse Ox  95   07/14/22 15:18


 


FiO2  21   07/08/22 18:03








                                 Intake & Output











 07/14/22 07/15/22 07/15/22





 18:59 06:59 18:59


 


Intake Total 192 138.683 


 


Output Total  150 


 


Balance 192 -11.317 


 


Intake:   


 


   120 


 


    .9 kvo 120 120 


 


  Intake, IV Titration 72 18.683 





  Amount   


 


    Morphine Sulfate (100 mg/ 72 18.683 





    2 ml) 100 mg In Sodium   





    Chloride 0.9% 100 ml @ 1   





    MG/HR 1.02 mls/hr IV .   





    [facility][id number]   


 


Output:   


 


  Urine  150 


 


Other:   


 


  Voiding Method Indwelling Catheter Indwelling Catheter Indwelling Catheter














- Exam


General: Patient unresponsive, appears comfortable


HEENT: Head is atraumatic, normocephalic Sclerae are clear. Pupils equal, round 

and reactive to light bilaterally- sluggish.  


CV: Heart regular in rate and rhythm positive S1 and S2. No clicks, rubs or 

murmurs.


Lungs: Diminished bilateral bases. No wheezes rales or rhonchi. Respirations 

even and unlabored. 2L NC.


Abdomen/GI: Soft. Bowel sounds present in all 4 quadrants. Bowel sounds 

hypoactive.


: Kennedy catheter with shannan urine present


Vascular: Radial pulses equal. 2/4. Pedal pulses equal 1/4. Feet cool to touch


Skin: Warm and dry. No rash. 


Neurologic: Patient does not open eyes or follow commands. No withdrawl from 

painful stimulus.  


Psychiatric: Unable to assess.








- Labs


CBC & Chem 7: 


                                 07/10/22 07:30





                                 07/10/22 07:30





Assessment and Plan


Assessment: 


Symptoms


* Pain - CPOT 0


* Fatigue - KEVEN


* SOB - KEVEN, appears comortable on 2L NC, continue Duoneb prn


* Insomnia - KEVEN


* N/V - KEVEN, continue Zofran prn


* Anxiety - appears calm, continue Ativan prn


* Depression - KEVEN


* Confusion - KEVEN


* Agitation - No


* Hallucinations - KEVEN


* Appetite/weight loss - NPO


* Dysphagia -KEVEN


* Constipation - KEVEN


* Incontinence - Kennedy catheter present


* Itch - KEVEN








Plan: 


Summary/Goals -  Patient resting in bed and appears comfortable.  Breathing non-

labored and regulalr. Per , the plan is to try to wean off the 

Morphine drip and get him back to his ECF with hospice.  Attempted to update 

Nabila. Left message on her voicemail.





Recommendations - ECF with hospice





Advanced Directives - Yes, on file





Code Status - DNR





Thank you for this consult





Erlinda Barajas [facility]


Palliative Care


[facility] [id number]


Email: [email or web address]





Time with Patient: Less than 30

## 2024-05-12 NOTE — CT
EXAMINATION TYPE: CT brain wo con

 

DATE OF EXAM: 7/8/2022

 

COMPARISON: 6/4/2021

 

HISTORY: 77-year-old male confusion, Altered mental status

 

TECHNIQUE:  Examination was done in axial plane without intravenous contrast.  Coronal and sagittal r
econstructions performed.

 

CT DLP: 1129.4 mGycm

Automated exposure control for dose reduction was used.

 

FINDINGS:

There is no evidence of  acute intracranial hemorrhage, acute ischemic changes, mass, mass-effect, or
 extra-axial fluid collection.  There is no effacement of cerebral sulci or basal subarachnoid cister
ns. There is no midline shift.  Gray-white matter distinction is preserved.

 

Some focal encephalomalacia anterior left frontal lobe. Moderate patchy white matter hypodensities in
 both cerebral hemispheres unchanged.

 

Mild hydrocephalus likely secondary to central cerebral atrophy. Correlate to exclude a component of 
NPH. Patient's head is obliqued towards the left.

 

Bilateral lateral bending. Leftward nasal septal deviation. Prior FESS. Small amount of fluid within 
the inferior most left mastoid air cells.

 

 

IMPRESSION:

 

Similar mild hydrocephalus likely central cerebral atrophy. Correlate to exclude a component of NPH. 
Unchanged encephalomalacia anterior left frontal lobe that could represent sequela of prior vascular 
or traumatic insult. No acute intracranial abnormality seen. show